# Patient Record
Sex: MALE | Race: BLACK OR AFRICAN AMERICAN | NOT HISPANIC OR LATINO
[De-identification: names, ages, dates, MRNs, and addresses within clinical notes are randomized per-mention and may not be internally consistent; named-entity substitution may affect disease eponyms.]

---

## 2019-09-23 ENCOUNTER — APPOINTMENT (OUTPATIENT)
Dept: UROLOGY | Facility: CLINIC | Age: 74
End: 2019-09-23
Payer: MEDICARE

## 2019-09-23 VITALS
WEIGHT: 175 LBS | SYSTOLIC BLOOD PRESSURE: 135 MMHG | HEIGHT: 69 IN | HEART RATE: 69 BPM | BODY MASS INDEX: 25.92 KG/M2 | TEMPERATURE: 98.2 F | DIASTOLIC BLOOD PRESSURE: 74 MMHG

## 2019-09-23 DIAGNOSIS — E11.9 TYPE 2 DIABETES MELLITUS W/OUT COMPLICATIONS: ICD-10-CM

## 2019-09-23 DIAGNOSIS — Z86.79 PERSONAL HISTORY OF OTHER DISEASES OF THE CIRCULATORY SYSTEM: ICD-10-CM

## 2019-09-23 DIAGNOSIS — Z86.69 PERSONAL HISTORY OF OTHER DISEASES OF THE NERVOUS SYSTEM AND SENSE ORGANS: ICD-10-CM

## 2019-09-23 DIAGNOSIS — N40.1 BENIGN PROSTATIC HYPERPLASIA WITH LOWER URINARY TRACT SYMPMS: ICD-10-CM

## 2019-09-23 DIAGNOSIS — N28.1 CYST OF KIDNEY, ACQUIRED: ICD-10-CM

## 2019-09-23 PROBLEM — Z00.00 ENCOUNTER FOR PREVENTIVE HEALTH EXAMINATION: Status: ACTIVE | Noted: 2019-09-23

## 2019-09-23 PROCEDURE — 51741 ELECTRO-UROFLOWMETRY FIRST: CPT

## 2019-09-23 PROCEDURE — 51798 US URINE CAPACITY MEASURE: CPT | Mod: 59

## 2019-09-23 PROCEDURE — 99214 OFFICE O/P EST MOD 30 MIN: CPT | Mod: 25

## 2019-09-23 PROCEDURE — 99205 OFFICE O/P NEW HI 60 MIN: CPT | Mod: 25

## 2019-09-23 RX ORDER — ROSUVASTATIN CALCIUM 20 MG/1
20 TABLET, FILM COATED ORAL
Refills: 0 | Status: ACTIVE | COMMUNITY

## 2019-09-23 RX ORDER — DORZOLAMIDE HYDROCHLORIDE 20 MG/ML
2 SOLUTION OPHTHALMIC
Refills: 0 | Status: ACTIVE | COMMUNITY

## 2019-09-23 RX ORDER — BRIMONIDINE TARTRATE, TIMOLOL MALEATE 2; 5 MG/ML; MG/ML
0.2-0.5 SOLUTION/ DROPS OPHTHALMIC
Refills: 0 | Status: ACTIVE | COMMUNITY

## 2019-09-23 RX ORDER — LINAGLIPTIN 5 MG/1
5 TABLET, FILM COATED ORAL
Refills: 0 | Status: ACTIVE | COMMUNITY

## 2019-09-23 RX ORDER — GLIMEPIRIDE 1 MG/1
1 TABLET ORAL
Refills: 0 | Status: ACTIVE | COMMUNITY

## 2019-09-23 RX ORDER — CHOLECALCIFEROL (VITAMIN D3) 25 MCG
TABLET ORAL
Refills: 0 | Status: ACTIVE | COMMUNITY

## 2019-09-23 RX ORDER — BIMATOPROST 0.1 MG/ML
0.01 SOLUTION/ DROPS OPHTHALMIC
Refills: 0 | Status: ACTIVE | COMMUNITY

## 2019-09-23 RX ORDER — PIOGLITAZONE HYDROCHLORIDE AND METFORMIN HYDROCHLORIDE 15; 850 MG/1; MG/1
15-850 TABLET, FILM COATED ORAL
Refills: 0 | Status: ACTIVE | COMMUNITY

## 2019-09-23 RX ORDER — IRBESARTAN AND HYDROCHLOROTHIAZIDE 300; 12.5 MG/1; MG/1
300-12.5 TABLET ORAL
Refills: 0 | Status: ACTIVE | COMMUNITY

## 2019-09-23 NOTE — LETTER BODY
[Dear  ___] : Dear  [unfilled], [Consult Letter:] : I had the pleasure of evaluating your patient, [unfilled]. [Please see my note below.] : Please see my note below. [Consult Closing:] : Thank you very much for allowing me to participate in the care of this patient.  If you have any questions, please do not hesitate to contact me. [Sincerely,] : Sincerely, [FreeTextEntry3] : Elie Herring MD\par

## 2019-09-23 NOTE — REVIEW OF SYSTEMS
[Negative] : Heme/Lymph [Feeling Poorly] : feeling poorly [Feeling Tired] : feeling tired [Eyesight Problems] : eyesight problems [Incontinence] : incontinence [Wake up at night to urinate  How many times?  ___] : wakes up to urinate [unfilled] times during the night [Strong urge to urinate] : strong urge to urinate [Slow urine stream] : slow urine stream [Interrupted urine stream] : interrupted urine stream [Joint Pain] : joint pain [Limb Swelling] : limb swelling [Muscle Weakness] : muscle weakness [Hesitancy] : urinary hesitancy [Nocturia] : nocturia [FreeTextEntry2] : htn

## 2019-09-23 NOTE — PHYSICAL EXAM
[General Appearance - Well Developed] : well developed [General Appearance - Well Nourished] : well nourished [Normal Appearance] : normal appearance [Well Groomed] : well groomed [General Appearance - In No Acute Distress] : no acute distress [Edema] : no peripheral edema [Respiration, Rhythm And Depth] : normal respiratory rhythm and effort [Exaggerated Use Of Accessory Muscles For Inspiration] : no accessory muscle use [Abdomen Soft] : soft [Abdomen Tenderness] : non-tender [Costovertebral Angle Tenderness] : no ~M costovertebral angle tenderness [Urethral Meatus] : meatus normal [Urinary Bladder Findings] : the bladder was normal on palpation [Scrotum] : the scrotum was normal [Testes Mass (___cm)] : there were no testicular masses [No Prostate Nodules] : no prostate nodules [Normal Station and Gait] : the gait and station were normal for the patient's age [] : no rash [No Focal Deficits] : no focal deficits [Oriented To Time, Place, And Person] : oriented to person, place, and time [Affect] : the affect was normal [Mood] : the mood was normal [Not Anxious] : not anxious [No Palpable Adenopathy] : no palpable adenopathy [FreeTextEntry1] : using cane

## 2019-09-27 LAB
APPEARANCE: CLEAR
BACTERIA UR CULT: NORMAL
BACTERIA: NEGATIVE
BILIRUBIN URINE: NEGATIVE
BLOOD URINE: NEGATIVE
COLOR: YELLOW
GLUCOSE QUALITATIVE U: NEGATIVE
HYALINE CASTS: 1 /LPF
KETONES URINE: NEGATIVE
LEUKOCYTE ESTERASE URINE: NEGATIVE
MICROSCOPIC-UA: NORMAL
NITRITE URINE: NEGATIVE
PH URINE: 5.5
PROTEIN URINE: NORMAL
RED BLOOD CELLS URINE: 2 /HPF
SPECIFIC GRAVITY URINE: 1.03
SQUAMOUS EPITHELIAL CELLS: 1 /HPF
UROBILINOGEN URINE: NORMAL
WHITE BLOOD CELLS URINE: 1 /HPF

## 2019-10-21 ENCOUNTER — APPOINTMENT (OUTPATIENT)
Dept: UROLOGY | Facility: CLINIC | Age: 74
End: 2019-10-21

## 2022-01-01 ENCOUNTER — OUTPATIENT (OUTPATIENT)
Dept: OUTPATIENT SERVICES | Facility: HOSPITAL | Age: 77
LOS: 1 days | Discharge: HOME | End: 2022-01-01
Payer: MEDICARE

## 2022-01-01 ENCOUNTER — OUTPATIENT (OUTPATIENT)
Dept: OUTPATIENT SERVICES | Facility: HOSPITAL | Age: 77
LOS: 1 days | End: 2022-01-01
Payer: MEDICARE

## 2022-01-01 ENCOUNTER — APPOINTMENT (OUTPATIENT)
Dept: VASCULAR SURGERY | Facility: CLINIC | Age: 77
End: 2022-01-01

## 2022-01-01 ENCOUNTER — APPOINTMENT (OUTPATIENT)
Dept: CT IMAGING | Facility: CLINIC | Age: 77
End: 2022-01-01

## 2022-01-01 ENCOUNTER — INPATIENT (INPATIENT)
Facility: HOSPITAL | Age: 77
LOS: 3 days | Discharge: EXPIRED | End: 2022-07-20
Attending: INTERNAL MEDICINE | Admitting: INTERNAL MEDICINE

## 2022-01-01 ENCOUNTER — APPOINTMENT (OUTPATIENT)
Dept: ULTRASOUND IMAGING | Facility: HOSPITAL | Age: 77
End: 2022-01-01

## 2022-01-01 ENCOUNTER — EMERGENCY (EMERGENCY)
Facility: HOSPITAL | Age: 77
LOS: 0 days | Discharge: HOME | End: 2022-07-05
Attending: EMERGENCY MEDICINE

## 2022-01-01 ENCOUNTER — RESULT REVIEW (OUTPATIENT)
Age: 77
End: 2022-01-01

## 2022-01-01 VITALS
HEIGHT: 69 IN | SYSTOLIC BLOOD PRESSURE: 130 MMHG | BODY MASS INDEX: 26.66 KG/M2 | HEART RATE: 97 BPM | WEIGHT: 180 LBS | DIASTOLIC BLOOD PRESSURE: 66 MMHG

## 2022-01-01 VITALS
SYSTOLIC BLOOD PRESSURE: 114 MMHG | BODY MASS INDEX: 26.66 KG/M2 | HEART RATE: 100 BPM | HEIGHT: 69 IN | WEIGHT: 180 LBS | DIASTOLIC BLOOD PRESSURE: 62 MMHG | OXYGEN SATURATION: 96 % | TEMPERATURE: 98.3 F

## 2022-01-01 VITALS
TEMPERATURE: 99 F | RESPIRATION RATE: 18 BRPM | HEART RATE: 67 BPM | SYSTOLIC BLOOD PRESSURE: 169 MMHG | OXYGEN SATURATION: 99 % | DIASTOLIC BLOOD PRESSURE: 70 MMHG

## 2022-01-01 VITALS
HEIGHT: 69 IN | TEMPERATURE: 98 F | OXYGEN SATURATION: 100 % | WEIGHT: 179.9 LBS | SYSTOLIC BLOOD PRESSURE: 155 MMHG | RESPIRATION RATE: 16 BRPM | HEART RATE: 72 BPM | DIASTOLIC BLOOD PRESSURE: 72 MMHG

## 2022-01-01 VITALS
HEIGHT: 69 IN | BODY MASS INDEX: 26.66 KG/M2 | HEART RATE: 76 BPM | DIASTOLIC BLOOD PRESSURE: 66 MMHG | SYSTOLIC BLOOD PRESSURE: 120 MMHG | WEIGHT: 180 LBS

## 2022-01-01 VITALS
HEIGHT: 69 IN | TEMPERATURE: 97 F | SYSTOLIC BLOOD PRESSURE: 113 MMHG | OXYGEN SATURATION: 99 % | RESPIRATION RATE: 18 BRPM | DIASTOLIC BLOOD PRESSURE: 61 MMHG | HEART RATE: 121 BPM

## 2022-01-01 VITALS — OXYGEN SATURATION: 100 %

## 2022-01-01 DIAGNOSIS — N17.9 ACUTE KIDNEY FAILURE, UNSPECIFIED: ICD-10-CM

## 2022-01-01 DIAGNOSIS — Z79.84 LONG TERM (CURRENT) USE OF ORAL HYPOGLYCEMIC DRUGS: ICD-10-CM

## 2022-01-01 DIAGNOSIS — Z98.89 OTHER SPECIFIED POSTPROCEDURAL STATES: Chronic | ICD-10-CM

## 2022-01-01 DIAGNOSIS — R60.9 EDEMA, UNSPECIFIED: ICD-10-CM

## 2022-01-01 DIAGNOSIS — S72.90XA UNSPECIFIED FRACTURE OF UNSPECIFIED FEMUR, INITIAL ENCOUNTER FOR CLOSED FRACTURE: Chronic | ICD-10-CM

## 2022-01-01 DIAGNOSIS — M79.605 PAIN IN LEFT LEG: ICD-10-CM

## 2022-01-01 DIAGNOSIS — I87.2 VENOUS INSUFFICIENCY (CHRONIC) (PERIPHERAL): ICD-10-CM

## 2022-01-01 DIAGNOSIS — M79.89 OTHER SPECIFIED SOFT TISSUE DISORDERS: ICD-10-CM

## 2022-01-01 DIAGNOSIS — M79.604 PAIN IN RIGHT LEG: ICD-10-CM

## 2022-01-01 DIAGNOSIS — I10 ESSENTIAL (PRIMARY) HYPERTENSION: ICD-10-CM

## 2022-01-01 DIAGNOSIS — G89.29 OTHER CHRONIC PAIN: ICD-10-CM

## 2022-01-01 DIAGNOSIS — I70.229 ATHEROSCLEROSIS OF NATIVE ARTERIES OF EXTREMITIES WITH REST PAIN, UNSPECIFIED EXTREMITY: ICD-10-CM

## 2022-01-01 DIAGNOSIS — K92.2 GASTROINTESTINAL HEMORRHAGE, UNSPECIFIED: ICD-10-CM

## 2022-01-01 DIAGNOSIS — K92.0 HEMATEMESIS: ICD-10-CM

## 2022-01-01 DIAGNOSIS — I70.202 UNSPECIFIED ATHEROSCLEROSIS OF NATIVE ARTERIES OF EXTREMITIES, LEFT LEG: ICD-10-CM

## 2022-01-01 DIAGNOSIS — I73.9 PERIPHERAL VASCULAR DISEASE, UNSPECIFIED: ICD-10-CM

## 2022-01-01 DIAGNOSIS — E11.51 TYPE 2 DIABETES MELLITUS WITH DIABETIC PERIPHERAL ANGIOPATHY WITHOUT GANGRENE: ICD-10-CM

## 2022-01-01 DIAGNOSIS — E11.9 TYPE 2 DIABETES MELLITUS WITHOUT COMPLICATIONS: ICD-10-CM

## 2022-01-01 DIAGNOSIS — Z95.828 PRESENCE OF OTHER VASCULAR IMPLANTS AND GRAFTS: ICD-10-CM

## 2022-01-01 DIAGNOSIS — E78.00 PURE HYPERCHOLESTEROLEMIA, UNSPECIFIED: ICD-10-CM

## 2022-01-01 DIAGNOSIS — Z00.8 ENCOUNTER FOR OTHER GENERAL EXAMINATION: ICD-10-CM

## 2022-01-01 DIAGNOSIS — Z78.9 OTHER SPECIFIED HEALTH STATUS: ICD-10-CM

## 2022-01-01 DIAGNOSIS — Z98.890 OTHER SPECIFIED POSTPROCEDURAL STATES: Chronic | ICD-10-CM

## 2022-01-01 DIAGNOSIS — E78.5 HYPERLIPIDEMIA, UNSPECIFIED: ICD-10-CM

## 2022-01-01 LAB
A1C WITH ESTIMATED AVERAGE GLUCOSE RESULT: 5.7 % — HIGH (ref 4–5.6)
ALBUMIN SERPL ELPH-MCNC: 1.9 G/DL — LOW (ref 3.3–5)
ALBUMIN SERPL ELPH-MCNC: 1.9 G/DL — LOW (ref 3.3–5)
ALBUMIN SERPL ELPH-MCNC: 2 G/DL — LOW (ref 3.3–5)
ALBUMIN SERPL ELPH-MCNC: 2.2 G/DL — LOW (ref 3.3–5)
ALBUMIN SERPL ELPH-MCNC: 2.9 G/DL — LOW (ref 3.3–5)
ALBUMIN SERPL ELPH-MCNC: 3.5 G/DL — SIGNIFICANT CHANGE UP (ref 3.3–5)
ALBUMIN SERPL ELPH-MCNC: 4.1 G/DL
ALP BLD-CCNC: 60 U/L
ALP SERPL-CCNC: 30 U/L — LOW (ref 40–120)
ALP SERPL-CCNC: 44 U/L — SIGNIFICANT CHANGE UP (ref 40–120)
ALP SERPL-CCNC: 44 U/L — SIGNIFICANT CHANGE UP (ref 40–120)
ALP SERPL-CCNC: 65 U/L — SIGNIFICANT CHANGE UP (ref 40–120)
ALP SERPL-CCNC: 73 U/L — SIGNIFICANT CHANGE UP (ref 40–120)
ALP SERPL-CCNC: 76 U/L — SIGNIFICANT CHANGE UP (ref 40–120)
ALT FLD-CCNC: 1687 U/L — HIGH (ref 4–41)
ALT FLD-CCNC: 2160 U/L — HIGH (ref 4–41)
ALT FLD-CCNC: 2171 U/L — HIGH (ref 4–41)
ALT FLD-CCNC: 3416 U/L — HIGH (ref 4–41)
ALT FLD-CCNC: 44 U/L — HIGH (ref 4–41)
ALT FLD-CCNC: 7 U/L — SIGNIFICANT CHANGE UP (ref 4–41)
ALT SERPL-CCNC: 11 U/L
ANION GAP SERPL CALC-SCNC: 10 MMOL/L
ANION GAP SERPL CALC-SCNC: 16 MMOL/L — HIGH (ref 7–14)
ANION GAP SERPL CALC-SCNC: 17 MMOL/L — HIGH (ref 7–14)
ANION GAP SERPL CALC-SCNC: 20 MMOL/L — HIGH (ref 7–14)
ANION GAP SERPL CALC-SCNC: 22 MMOL/L — HIGH (ref 7–14)
ANION GAP SERPL CALC-SCNC: 27 MMOL/L — HIGH (ref 7–14)
ANION GAP SERPL CALC-SCNC: 31 MMOL/L — HIGH (ref 7–14)
ANION GAP SERPL CALC-SCNC: 33 MMOL/L — HIGH (ref 7–14)
ANION GAP SERPL CALC-SCNC: 9 MMOL/L — SIGNIFICANT CHANGE UP (ref 7–14)
ANION GAP SERPL CALC-SCNC: >36 MMOL/L — HIGH (ref 7–14)
ANION GAP SERPL CALC-SCNC: >37 MMOL/L — HIGH (ref 7–14)
APTT BLD: 24.9 SEC — LOW (ref 27–36.3)
APTT BLD: 28.7 SEC — SIGNIFICANT CHANGE UP (ref 27–36.3)
APTT BLD: 39.8 SEC — HIGH (ref 27–36.3)
APTT BLD: 41.2 SEC — HIGH (ref 27–36.3)
APTT BLD: 43.6 SEC — HIGH (ref 27–36.3)
AST SERPL-CCNC: 14 U/L — SIGNIFICANT CHANGE UP (ref 4–40)
AST SERPL-CCNC: 20 U/L
AST SERPL-CCNC: 2489 U/L — HIGH (ref 4–40)
AST SERPL-CCNC: 2536 U/L — HIGH (ref 4–40)
AST SERPL-CCNC: 3036 U/L — HIGH (ref 4–40)
AST SERPL-CCNC: 3222 U/L — HIGH (ref 4–40)
AST SERPL-CCNC: 37 U/L — SIGNIFICANT CHANGE UP (ref 4–40)
BASOPHILS # BLD AUTO: 0 K/UL — SIGNIFICANT CHANGE UP (ref 0–0.2)
BASOPHILS # BLD AUTO: 0 K/UL — SIGNIFICANT CHANGE UP (ref 0–0.2)
BASOPHILS # BLD AUTO: 0.01 K/UL — SIGNIFICANT CHANGE UP (ref 0–0.2)
BASOPHILS # BLD AUTO: 0.01 K/UL — SIGNIFICANT CHANGE UP (ref 0–0.2)
BASOPHILS # BLD AUTO: 0.02 K/UL — SIGNIFICANT CHANGE UP (ref 0–0.2)
BASOPHILS # BLD AUTO: 0.04 K/UL — SIGNIFICANT CHANGE UP (ref 0–0.2)
BASOPHILS # BLD AUTO: 0.04 K/UL — SIGNIFICANT CHANGE UP (ref 0–0.2)
BASOPHILS # BLD AUTO: 0.07 K/UL — SIGNIFICANT CHANGE UP (ref 0–0.2)
BASOPHILS NFR BLD AUTO: 0 % — SIGNIFICANT CHANGE UP (ref 0–2)
BASOPHILS NFR BLD AUTO: 0 % — SIGNIFICANT CHANGE UP (ref 0–2)
BASOPHILS NFR BLD AUTO: 0.2 % — SIGNIFICANT CHANGE UP (ref 0–2)
BASOPHILS NFR BLD AUTO: 0.2 % — SIGNIFICANT CHANGE UP (ref 0–2)
BASOPHILS NFR BLD AUTO: 0.6 % — SIGNIFICANT CHANGE UP (ref 0–2)
BASOPHILS NFR BLD AUTO: 0.6 % — SIGNIFICANT CHANGE UP (ref 0–2)
BASOPHILS NFR BLD AUTO: 0.8 % — SIGNIFICANT CHANGE UP (ref 0–2)
BASOPHILS NFR BLD AUTO: 1 % — SIGNIFICANT CHANGE UP (ref 0–2)
BILIRUB SERPL-MCNC: 0.2 MG/DL
BILIRUB SERPL-MCNC: 0.3 MG/DL — SIGNIFICANT CHANGE UP (ref 0.2–1.2)
BILIRUB SERPL-MCNC: 0.7 MG/DL — SIGNIFICANT CHANGE UP (ref 0.2–1.2)
BILIRUB SERPL-MCNC: 0.7 MG/DL — SIGNIFICANT CHANGE UP (ref 0.2–1.2)
BILIRUB SERPL-MCNC: 1 MG/DL — SIGNIFICANT CHANGE UP (ref 0.2–1.2)
BILIRUB SERPL-MCNC: 1.1 MG/DL — SIGNIFICANT CHANGE UP (ref 0.2–1.2)
BILIRUB SERPL-MCNC: 1.2 MG/DL — SIGNIFICANT CHANGE UP (ref 0.2–1.2)
BLD GP AB SCN SERPL QL: NEGATIVE — SIGNIFICANT CHANGE UP
BLOOD GAS ARTERIAL - LYTES,HGB,ICA,LACT RESULT: SIGNIFICANT CHANGE UP
BLOOD GAS ARTERIAL COMPREHENSIVE RESULT: SIGNIFICANT CHANGE UP
BLOOD GAS VENOUS COMPREHENSIVE RESULT: SIGNIFICANT CHANGE UP
BUN SERPL-MCNC: 105 MG/DL — HIGH (ref 7–23)
BUN SERPL-MCNC: 107 MG/DL — HIGH (ref 7–23)
BUN SERPL-MCNC: 109 MG/DL — HIGH (ref 7–23)
BUN SERPL-MCNC: 111 MG/DL — HIGH (ref 7–23)
BUN SERPL-MCNC: 111 MG/DL — HIGH (ref 7–23)
BUN SERPL-MCNC: 112 MG/DL — HIGH (ref 7–23)
BUN SERPL-MCNC: 114 MG/DL — HIGH (ref 7–23)
BUN SERPL-MCNC: 120 MG/DL — HIGH (ref 7–23)
BUN SERPL-MCNC: 122 MG/DL — HIGH (ref 7–23)
BUN SERPL-MCNC: 124 MG/DL — HIGH (ref 7–23)
BUN SERPL-MCNC: 40 MG/DL
CALCIUM SERPL-MCNC: 6.6 MG/DL — LOW (ref 8.4–10.5)
CALCIUM SERPL-MCNC: 6.7 MG/DL — LOW (ref 8.4–10.5)
CALCIUM SERPL-MCNC: 7.1 MG/DL — LOW (ref 8.4–10.5)
CALCIUM SERPL-MCNC: 8.2 MG/DL — LOW (ref 8.4–10.5)
CALCIUM SERPL-MCNC: 8.3 MG/DL — LOW (ref 8.4–10.5)
CALCIUM SERPL-MCNC: 8.4 MG/DL — SIGNIFICANT CHANGE UP (ref 8.4–10.5)
CALCIUM SERPL-MCNC: 8.5 MG/DL — SIGNIFICANT CHANGE UP (ref 8.4–10.5)
CALCIUM SERPL-MCNC: 8.7 MG/DL — SIGNIFICANT CHANGE UP (ref 8.4–10.5)
CALCIUM SERPL-MCNC: 8.8 MG/DL — SIGNIFICANT CHANGE UP (ref 8.4–10.5)
CALCIUM SERPL-MCNC: 9.8 MG/DL
CALCIUM SERPL-MCNC: 9.8 MG/DL — SIGNIFICANT CHANGE UP (ref 8.4–10.5)
CHLORIDE SERPL-SCNC: 104 MMOL/L — SIGNIFICANT CHANGE UP (ref 98–107)
CHLORIDE SERPL-SCNC: 107 MMOL/L
CHLORIDE SERPL-SCNC: 108 MMOL/L — HIGH (ref 98–107)
CHLORIDE SERPL-SCNC: 109 MMOL/L — HIGH (ref 98–107)
CHLORIDE SERPL-SCNC: 109 MMOL/L — HIGH (ref 98–107)
CHLORIDE SERPL-SCNC: 111 MMOL/L — HIGH (ref 98–107)
CHLORIDE SERPL-SCNC: 112 MMOL/L — HIGH (ref 98–107)
CHLORIDE SERPL-SCNC: 112 MMOL/L — HIGH (ref 98–107)
CHLORIDE SERPL-SCNC: 113 MMOL/L — HIGH (ref 98–107)
CO2 SERPL-SCNC: 10 MMOL/L — CRITICAL LOW (ref 22–31)
CO2 SERPL-SCNC: 11 MMOL/L — LOW (ref 22–31)
CO2 SERPL-SCNC: 11 MMOL/L — LOW (ref 22–31)
CO2 SERPL-SCNC: 12 MMOL/L — LOW (ref 22–31)
CO2 SERPL-SCNC: 17 MMOL/L — LOW (ref 22–31)
CO2 SERPL-SCNC: 18 MMOL/L — LOW (ref 22–31)
CO2 SERPL-SCNC: 26 MMOL/L
CO2 SERPL-SCNC: <7 MMOL/L — CRITICAL LOW (ref 22–31)
CO2 SERPL-SCNC: <7 MMOL/L — CRITICAL LOW (ref 22–31)
CREAT ?TM UR-MCNC: 154 MG/DL — SIGNIFICANT CHANGE UP
CREAT SERPL-MCNC: 1.79 MG/DL — HIGH (ref 0.5–1.3)
CREAT SERPL-MCNC: 1.88 MG/DL — HIGH (ref 0.5–1.3)
CREAT SERPL-MCNC: 2.21 MG/DL
CREAT SERPL-MCNC: 2.43 MG/DL — HIGH (ref 0.5–1.3)
CREAT SERPL-MCNC: 2.53 MG/DL — HIGH (ref 0.5–1.3)
CREAT SERPL-MCNC: 2.58 MG/DL — HIGH (ref 0.5–1.3)
CREAT SERPL-MCNC: 3.33 MG/DL — HIGH (ref 0.5–1.3)
CREAT SERPL-MCNC: 3.37 MG/DL — HIGH (ref 0.5–1.3)
CREAT SERPL-MCNC: 3.56 MG/DL — HIGH (ref 0.5–1.3)
CREAT SERPL-MCNC: 3.68 MG/DL — HIGH (ref 0.5–1.3)
CREAT SERPL-MCNC: 3.76 MG/DL — HIGH (ref 0.5–1.3)
EGFR: 16 ML/MIN/1.73M2 — LOW
EGFR: 16 ML/MIN/1.73M2 — LOW
EGFR: 17 ML/MIN/1.73M2 — LOW
EGFR: 18 ML/MIN/1.73M2 — LOW
EGFR: 18 ML/MIN/1.73M2 — LOW
EGFR: 25 ML/MIN/1.73M2 — LOW
EGFR: 25 ML/MIN/1.73M2 — LOW
EGFR: 27 ML/MIN/1.73M2 — LOW
EGFR: 30 ML/MIN/1.73M2
EGFR: 36 ML/MIN/1.73M2 — LOW
EGFR: 39 ML/MIN/1.73M2 — LOW
EOSINOPHIL # BLD AUTO: 0 K/UL — SIGNIFICANT CHANGE UP (ref 0–0.5)
EOSINOPHIL # BLD AUTO: 0 K/UL — SIGNIFICANT CHANGE UP (ref 0–0.5)
EOSINOPHIL # BLD AUTO: 0.01 K/UL — SIGNIFICANT CHANGE UP (ref 0–0.5)
EOSINOPHIL # BLD AUTO: 0.03 K/UL — SIGNIFICANT CHANGE UP (ref 0–0.5)
EOSINOPHIL NFR BLD AUTO: 0 % — SIGNIFICANT CHANGE UP (ref 0–6)
EOSINOPHIL NFR BLD AUTO: 0 % — SIGNIFICANT CHANGE UP (ref 0–6)
EOSINOPHIL NFR BLD AUTO: 0.2 % — SIGNIFICANT CHANGE UP (ref 0–6)
EOSINOPHIL NFR BLD AUTO: 0.3 % — SIGNIFICANT CHANGE UP (ref 0–6)
EOSINOPHIL NFR BLD AUTO: 0.3 % — SIGNIFICANT CHANGE UP (ref 0–6)
ESTIMATED AVERAGE GLUCOSE: 117 — SIGNIFICANT CHANGE UP
FLUAV AG NPH QL: SIGNIFICANT CHANGE UP
FLUBV AG NPH QL: SIGNIFICANT CHANGE UP
GAS PNL BLDA: SIGNIFICANT CHANGE UP
GAS PNL BLDA: SIGNIFICANT CHANGE UP
GLUCOSE BLDC GLUCOMTR-MCNC: 107 MG/DL — HIGH (ref 70–99)
GLUCOSE BLDC GLUCOMTR-MCNC: 125 MG/DL — HIGH (ref 70–99)
GLUCOSE BLDC GLUCOMTR-MCNC: 151 MG/DL — HIGH (ref 70–99)
GLUCOSE BLDC GLUCOMTR-MCNC: 156 MG/DL — HIGH (ref 70–99)
GLUCOSE BLDC GLUCOMTR-MCNC: 165 MG/DL — HIGH (ref 70–99)
GLUCOSE BLDC GLUCOMTR-MCNC: 175 MG/DL — HIGH (ref 70–99)
GLUCOSE BLDC GLUCOMTR-MCNC: 194 MG/DL — HIGH (ref 70–99)
GLUCOSE BLDC GLUCOMTR-MCNC: 214 MG/DL — HIGH (ref 70–99)
GLUCOSE BLDC GLUCOMTR-MCNC: 241 MG/DL — HIGH (ref 70–99)
GLUCOSE BLDC GLUCOMTR-MCNC: 255 MG/DL — HIGH (ref 70–99)
GLUCOSE BLDC GLUCOMTR-MCNC: 273 MG/DL — HIGH (ref 70–99)
GLUCOSE BLDC GLUCOMTR-MCNC: 274 MG/DL — HIGH (ref 70–99)
GLUCOSE BLDC GLUCOMTR-MCNC: 276 MG/DL — HIGH (ref 70–99)
GLUCOSE BLDC GLUCOMTR-MCNC: 285 MG/DL — HIGH (ref 70–99)
GLUCOSE SERPL-MCNC: 128 MG/DL — HIGH (ref 70–99)
GLUCOSE SERPL-MCNC: 133 MG/DL — HIGH (ref 70–99)
GLUCOSE SERPL-MCNC: 158 MG/DL — HIGH (ref 70–99)
GLUCOSE SERPL-MCNC: 163 MG/DL — HIGH (ref 70–99)
GLUCOSE SERPL-MCNC: 167 MG/DL — HIGH (ref 70–99)
GLUCOSE SERPL-MCNC: 176 MG/DL — HIGH (ref 70–99)
GLUCOSE SERPL-MCNC: 227 MG/DL — HIGH (ref 70–99)
GLUCOSE SERPL-MCNC: 236 MG/DL — HIGH (ref 70–99)
GLUCOSE SERPL-MCNC: 281 MG/DL — HIGH (ref 70–99)
GLUCOSE SERPL-MCNC: 283 MG/DL — HIGH (ref 70–99)
GLUCOSE SERPL-MCNC: 53 MG/DL
HCT VFR BLD CALC: 15.7 % — CRITICAL LOW (ref 39–50)
HCT VFR BLD CALC: 17.6 % — CRITICAL LOW (ref 39–50)
HCT VFR BLD CALC: 20.3 % — CRITICAL LOW (ref 39–50)
HCT VFR BLD CALC: 20.9 % — CRITICAL LOW (ref 39–50)
HCT VFR BLD CALC: 21.6 % — LOW (ref 39–50)
HCT VFR BLD CALC: 21.7 % — LOW (ref 39–50)
HCT VFR BLD CALC: 22.2 % — LOW (ref 39–50)
HCT VFR BLD CALC: 22.9 % — LOW (ref 39–50)
HCT VFR BLD CALC: 23.5 % — LOW (ref 39–50)
HCT VFR BLD CALC: 26.1 % — LOW (ref 39–50)
HCT VFR BLD CALC: 28.1 % — LOW (ref 39–50)
HCT VFR BLD CALC: 28.2 % — LOW (ref 39–50)
HCT VFR BLD CALC: 29.1 % — LOW (ref 39–50)
HCT VFR BLD CALC: 31.4 % — LOW (ref 39–50)
HGB BLD-MCNC: 10.2 G/DL — LOW (ref 13–17)
HGB BLD-MCNC: 5.3 G/DL — CRITICAL LOW (ref 13–17)
HGB BLD-MCNC: 5.6 G/DL — CRITICAL LOW (ref 13–17)
HGB BLD-MCNC: 6.6 G/DL — CRITICAL LOW (ref 13–17)
HGB BLD-MCNC: 7.1 G/DL — LOW (ref 13–17)
HGB BLD-MCNC: 7.2 G/DL — LOW (ref 13–17)
HGB BLD-MCNC: 7.3 G/DL — LOW (ref 13–17)
HGB BLD-MCNC: 7.4 G/DL — LOW (ref 13–17)
HGB BLD-MCNC: 7.8 G/DL — LOW (ref 13–17)
HGB BLD-MCNC: 7.9 G/DL — LOW (ref 13–17)
HGB BLD-MCNC: 8.6 G/DL — LOW (ref 13–17)
HGB BLD-MCNC: 8.7 G/DL — LOW (ref 13–17)
HGB BLD-MCNC: 9.7 G/DL — LOW (ref 13–17)
HGB BLD-MCNC: 9.9 G/DL — LOW (ref 13–17)
IANC: 0.89 K/UL — LOW (ref 1.8–7.4)
IANC: 1.95 K/UL — SIGNIFICANT CHANGE UP (ref 1.8–7.4)
IANC: 2.9 K/UL — SIGNIFICANT CHANGE UP (ref 1.8–7.4)
IANC: 3.13 K/UL — SIGNIFICANT CHANGE UP (ref 1.8–7.4)
IANC: 3.79 K/UL — SIGNIFICANT CHANGE UP (ref 1.8–7.4)
IANC: 4.27 K/UL — SIGNIFICANT CHANGE UP (ref 1.8–7.4)
IANC: 4.69 K/UL — SIGNIFICANT CHANGE UP (ref 1.8–7.4)
IANC: 7 K/UL — SIGNIFICANT CHANGE UP (ref 1.8–7.4)
IMM GRANULOCYTES NFR BLD AUTO: 0.2 % — SIGNIFICANT CHANGE UP (ref 0–1.5)
IMM GRANULOCYTES NFR BLD AUTO: 0.3 % — SIGNIFICANT CHANGE UP (ref 0–1.5)
IMM GRANULOCYTES NFR BLD AUTO: 0.3 % — SIGNIFICANT CHANGE UP (ref 0–1.5)
IMM GRANULOCYTES NFR BLD AUTO: 2 % — HIGH (ref 0–1.5)
IMM GRANULOCYTES NFR BLD AUTO: 2.2 % — HIGH (ref 0–1.5)
IMM GRANULOCYTES NFR BLD AUTO: 3.3 % — HIGH (ref 0–1.5)
IMM GRANULOCYTES NFR BLD AUTO: 5.5 % — HIGH (ref 0–1.5)
INR BLD: 1.13 RATIO — SIGNIFICANT CHANGE UP (ref 0.88–1.16)
INR BLD: 1.41 RATIO — HIGH (ref 0.88–1.16)
INR BLD: 1.68 RATIO — HIGH (ref 0.88–1.16)
INR BLD: 1.83 RATIO — HIGH (ref 0.88–1.16)
INR BLD: 2.05 RATIO — HIGH (ref 0.88–1.16)
INR BLD: 2.64 RATIO — HIGH (ref 0.88–1.16)
LACTATE SERPL-SCNC: 13.9 MMOL/L — CRITICAL HIGH (ref 0.5–2)
LACTATE SERPL-SCNC: 15 MMOL/L — CRITICAL HIGH (ref 0.5–2)
LYMPHOCYTES # BLD AUTO: 0.14 K/UL — LOW (ref 1–3.3)
LYMPHOCYTES # BLD AUTO: 0.29 K/UL — LOW (ref 1–3.3)
LYMPHOCYTES # BLD AUTO: 0.61 K/UL — LOW (ref 1–3.3)
LYMPHOCYTES # BLD AUTO: 0.99 K/UL — LOW (ref 1–3.3)
LYMPHOCYTES # BLD AUTO: 1.08 K/UL — SIGNIFICANT CHANGE UP (ref 1–3.3)
LYMPHOCYTES # BLD AUTO: 1.11 K/UL — SIGNIFICANT CHANGE UP (ref 1–3.3)
LYMPHOCYTES # BLD AUTO: 1.68 K/UL — SIGNIFICANT CHANGE UP (ref 1–3.3)
LYMPHOCYTES # BLD AUTO: 17.6 % — SIGNIFICANT CHANGE UP (ref 13–44)
LYMPHOCYTES # BLD AUTO: 18.2 % — SIGNIFICANT CHANGE UP (ref 13–44)
LYMPHOCYTES # BLD AUTO: 2.46 K/UL — SIGNIFICANT CHANGE UP (ref 1–3.3)
LYMPHOCYTES # BLD AUTO: 21.5 % — SIGNIFICANT CHANGE UP (ref 13–44)
LYMPHOCYTES # BLD AUTO: 22.4 % — SIGNIFICANT CHANGE UP (ref 13–44)
LYMPHOCYTES # BLD AUTO: 27.1 % — SIGNIFICANT CHANGE UP (ref 13–44)
LYMPHOCYTES # BLD AUTO: 47.9 % — HIGH (ref 13–44)
LYMPHOCYTES # BLD AUTO: 5.6 % — LOW (ref 13–44)
LYMPHOCYTES # BLD AUTO: 7.4 % — LOW (ref 13–44)
MAGNESIUM SERPL-MCNC: 1.9 MG/DL — SIGNIFICANT CHANGE UP (ref 1.6–2.6)
MAGNESIUM SERPL-MCNC: 2.1 MG/DL — SIGNIFICANT CHANGE UP (ref 1.6–2.6)
MAGNESIUM SERPL-MCNC: 2.3 MG/DL — SIGNIFICANT CHANGE UP (ref 1.6–2.6)
MAGNESIUM SERPL-MCNC: 2.8 MG/DL — HIGH (ref 1.6–2.6)
MAGNESIUM SERPL-MCNC: 3.1 MG/DL — HIGH (ref 1.6–2.6)
MCHC RBC-ENTMCNC: 30.3 PG — SIGNIFICANT CHANGE UP (ref 27–34)
MCHC RBC-ENTMCNC: 30.5 GM/DL — LOW (ref 32–36)
MCHC RBC-ENTMCNC: 30.5 PG — SIGNIFICANT CHANGE UP (ref 27–34)
MCHC RBC-ENTMCNC: 30.8 PG — SIGNIFICANT CHANGE UP (ref 27–34)
MCHC RBC-ENTMCNC: 30.8 PG — SIGNIFICANT CHANGE UP (ref 27–34)
MCHC RBC-ENTMCNC: 30.9 PG — SIGNIFICANT CHANGE UP (ref 27–34)
MCHC RBC-ENTMCNC: 31.1 PG — SIGNIFICANT CHANGE UP (ref 27–34)
MCHC RBC-ENTMCNC: 31.3 PG — SIGNIFICANT CHANGE UP (ref 27–34)
MCHC RBC-ENTMCNC: 31.4 PG — SIGNIFICANT CHANGE UP (ref 27–34)
MCHC RBC-ENTMCNC: 31.6 PG — SIGNIFICANT CHANGE UP (ref 27–34)
MCHC RBC-ENTMCNC: 31.8 GM/DL — LOW (ref 32–36)
MCHC RBC-ENTMCNC: 31.9 PG — SIGNIFICANT CHANGE UP (ref 27–34)
MCHC RBC-ENTMCNC: 32.4 PG — SIGNIFICANT CHANGE UP (ref 27–34)
MCHC RBC-ENTMCNC: 32.5 GM/DL — SIGNIFICANT CHANGE UP (ref 32–36)
MCHC RBC-ENTMCNC: 32.5 GM/DL — SIGNIFICANT CHANGE UP (ref 32–36)
MCHC RBC-ENTMCNC: 32.9 GM/DL — SIGNIFICANT CHANGE UP (ref 32–36)
MCHC RBC-ENTMCNC: 33.3 GM/DL — SIGNIFICANT CHANGE UP (ref 32–36)
MCHC RBC-ENTMCNC: 33.6 GM/DL — SIGNIFICANT CHANGE UP (ref 32–36)
MCHC RBC-ENTMCNC: 33.8 GM/DL — SIGNIFICANT CHANGE UP (ref 32–36)
MCHC RBC-ENTMCNC: 34 GM/DL — SIGNIFICANT CHANGE UP (ref 32–36)
MCHC RBC-ENTMCNC: 34.1 GM/DL — SIGNIFICANT CHANGE UP (ref 32–36)
MCHC RBC-ENTMCNC: 34.1 GM/DL — SIGNIFICANT CHANGE UP (ref 32–36)
MCHC RBC-ENTMCNC: 35.2 GM/DL — SIGNIFICANT CHANGE UP (ref 32–36)
MCV RBC AUTO: 100 FL — SIGNIFICANT CHANGE UP (ref 80–100)
MCV RBC AUTO: 101.7 FL — HIGH (ref 80–100)
MCV RBC AUTO: 89.8 FL — SIGNIFICANT CHANGE UP (ref 80–100)
MCV RBC AUTO: 90.9 FL — SIGNIFICANT CHANGE UP (ref 80–100)
MCV RBC AUTO: 91.2 FL — SIGNIFICANT CHANGE UP (ref 80–100)
MCV RBC AUTO: 92.3 FL — SIGNIFICANT CHANGE UP (ref 80–100)
MCV RBC AUTO: 92.7 FL — SIGNIFICANT CHANGE UP (ref 80–100)
MCV RBC AUTO: 92.9 FL — SIGNIFICANT CHANGE UP (ref 80–100)
MCV RBC AUTO: 93.3 FL — SIGNIFICANT CHANGE UP (ref 80–100)
MCV RBC AUTO: 94.1 FL — SIGNIFICANT CHANGE UP (ref 80–100)
MCV RBC AUTO: 94.6 FL — SIGNIFICANT CHANGE UP (ref 80–100)
MCV RBC AUTO: 94.9 FL — SIGNIFICANT CHANGE UP (ref 80–100)
MCV RBC AUTO: 94.9 FL — SIGNIFICANT CHANGE UP (ref 80–100)
MCV RBC AUTO: 96.6 FL — SIGNIFICANT CHANGE UP (ref 80–100)
MONOCYTES # BLD AUTO: 0 K/UL — SIGNIFICANT CHANGE UP (ref 0–0.9)
MONOCYTES # BLD AUTO: 0.33 K/UL — SIGNIFICANT CHANGE UP (ref 0–0.9)
MONOCYTES # BLD AUTO: 0.33 K/UL — SIGNIFICANT CHANGE UP (ref 0–0.9)
MONOCYTES # BLD AUTO: 0.46 K/UL — SIGNIFICANT CHANGE UP (ref 0–0.9)
MONOCYTES # BLD AUTO: 0.53 K/UL — SIGNIFICANT CHANGE UP (ref 0–0.9)
MONOCYTES # BLD AUTO: 0.56 K/UL — SIGNIFICANT CHANGE UP (ref 0–0.9)
MONOCYTES # BLD AUTO: 0.7 K/UL — SIGNIFICANT CHANGE UP (ref 0–0.9)
MONOCYTES # BLD AUTO: 0.84 K/UL — SIGNIFICANT CHANGE UP (ref 0–0.9)
MONOCYTES NFR BLD AUTO: 0 % — LOW (ref 2–14)
MONOCYTES NFR BLD AUTO: 10 % — SIGNIFICANT CHANGE UP (ref 2–14)
MONOCYTES NFR BLD AUTO: 11.3 % — SIGNIFICANT CHANGE UP (ref 2–14)
MONOCYTES NFR BLD AUTO: 13.3 % — SIGNIFICANT CHANGE UP (ref 2–14)
MONOCYTES NFR BLD AUTO: 13.6 % — SIGNIFICANT CHANGE UP (ref 2–14)
MONOCYTES NFR BLD AUTO: 5.2 % — SIGNIFICANT CHANGE UP (ref 2–14)
MONOCYTES NFR BLD AUTO: 7.6 % — SIGNIFICANT CHANGE UP (ref 2–14)
MONOCYTES NFR BLD AUTO: 9.4 % — SIGNIFICANT CHANGE UP (ref 2–14)
NEUTROPHILS # BLD AUTO: 0.67 K/UL — LOW (ref 1.8–7.4)
NEUTROPHILS # BLD AUTO: 1.95 K/UL — SIGNIFICANT CHANGE UP (ref 1.8–7.4)
NEUTROPHILS # BLD AUTO: 2.9 K/UL — SIGNIFICANT CHANGE UP (ref 1.8–7.4)
NEUTROPHILS # BLD AUTO: 3.13 K/UL — SIGNIFICANT CHANGE UP (ref 1.8–7.4)
NEUTROPHILS # BLD AUTO: 3.79 K/UL — SIGNIFICANT CHANGE UP (ref 1.8–7.4)
NEUTROPHILS # BLD AUTO: 4.27 K/UL — SIGNIFICANT CHANGE UP (ref 1.8–7.4)
NEUTROPHILS # BLD AUTO: 4.69 K/UL — SIGNIFICANT CHANGE UP (ref 1.8–7.4)
NEUTROPHILS # BLD AUTO: 7 K/UL — SIGNIFICANT CHANGE UP (ref 1.8–7.4)
NEUTROPHILS NFR BLD AUTO: 52.1 % — SIGNIFICANT CHANGE UP (ref 43–77)
NEUTROPHILS NFR BLD AUTO: 60.9 % — SIGNIFICANT CHANGE UP (ref 43–77)
NEUTROPHILS NFR BLD AUTO: 63.6 % — SIGNIFICANT CHANGE UP (ref 43–77)
NEUTROPHILS NFR BLD AUTO: 68.1 % — SIGNIFICANT CHANGE UP (ref 43–77)
NEUTROPHILS NFR BLD AUTO: 71.7 % — SIGNIFICANT CHANGE UP (ref 43–77)
NEUTROPHILS NFR BLD AUTO: 74.2 % — SIGNIFICANT CHANGE UP (ref 43–77)
NEUTROPHILS NFR BLD AUTO: 74.4 % — SIGNIFICANT CHANGE UP (ref 43–77)
NEUTROPHILS NFR BLD AUTO: 78.3 % — HIGH (ref 43–77)
NRBC # BLD: 0 /100 WBCS — SIGNIFICANT CHANGE UP
NRBC # BLD: 105 /100 WBCS — SIGNIFICANT CHANGE UP
NRBC # BLD: 124 /100 WBCS — SIGNIFICANT CHANGE UP
NRBC # BLD: 2 /100 WBCS — SIGNIFICANT CHANGE UP
NRBC # BLD: 2 /100 WBCS — SIGNIFICANT CHANGE UP
NRBC # BLD: 20 /100 WBCS — SIGNIFICANT CHANGE UP
NRBC # BLD: 26 /100 WBCS — SIGNIFICANT CHANGE UP
NRBC # FLD: 0 K/UL — SIGNIFICANT CHANGE UP
NRBC # FLD: 0.02 K/UL — HIGH
NRBC # FLD: 0.03 K/UL — HIGH
NRBC # FLD: 0.04 K/UL — HIGH
NRBC # FLD: 0.24 K/UL — HIGH
NRBC # FLD: 0.3 K/UL — HIGH
NRBC # FLD: 1.66 K/UL — HIGH
NRBC # FLD: 2.22 K/UL — HIGH
NRBC # FLD: 3.09 K/UL — HIGH
NRBC # FLD: 4.08 K/UL — HIGH
PHOSPHATE SERPL-MCNC: 11.8 MG/DL — HIGH (ref 2.5–4.5)
PHOSPHATE SERPL-MCNC: 12.7 MG/DL — HIGH (ref 2.5–4.5)
PHOSPHATE SERPL-MCNC: 15.3 MG/DL — HIGH (ref 2.5–4.5)
PHOSPHATE SERPL-MCNC: 16.9 MG/DL — HIGH (ref 2.5–4.5)
PHOSPHATE SERPL-MCNC: 2.2 MG/DL — LOW (ref 2.5–4.5)
PHOSPHATE SERPL-MCNC: 5.2 MG/DL — HIGH (ref 2.5–4.5)
PHOSPHATE SERPL-MCNC: 8.6 MG/DL — HIGH (ref 2.5–4.5)
PLATELET # BLD AUTO: 101 K/UL — LOW (ref 150–400)
PLATELET # BLD AUTO: 110 K/UL — LOW (ref 150–400)
PLATELET # BLD AUTO: 112 K/UL — LOW (ref 150–400)
PLATELET # BLD AUTO: 132 K/UL — LOW (ref 150–400)
PLATELET # BLD AUTO: 136 K/UL — LOW (ref 150–400)
PLATELET # BLD AUTO: 138 K/UL — LOW (ref 150–400)
PLATELET # BLD AUTO: 154 K/UL — SIGNIFICANT CHANGE UP (ref 150–400)
PLATELET # BLD AUTO: 173 K/UL — SIGNIFICANT CHANGE UP (ref 150–400)
PLATELET # BLD AUTO: 192 K/UL — SIGNIFICANT CHANGE UP (ref 150–400)
PLATELET # BLD AUTO: 254 K/UL — SIGNIFICANT CHANGE UP (ref 150–400)
PLATELET # BLD AUTO: 54 K/UL — LOW (ref 150–400)
PLATELET # BLD AUTO: 79 K/UL — LOW (ref 150–400)
PLATELET # BLD AUTO: 85 K/UL — LOW (ref 150–400)
PLATELET # BLD AUTO: 85 K/UL — LOW (ref 150–400)
POTASSIUM SERPL-MCNC: 4.6 MMOL/L — SIGNIFICANT CHANGE UP (ref 3.5–5.3)
POTASSIUM SERPL-MCNC: 4.9 MMOL/L — SIGNIFICANT CHANGE UP (ref 3.5–5.3)
POTASSIUM SERPL-MCNC: 5 MMOL/L — SIGNIFICANT CHANGE UP (ref 3.5–5.3)
POTASSIUM SERPL-MCNC: 5 MMOL/L — SIGNIFICANT CHANGE UP (ref 3.5–5.3)
POTASSIUM SERPL-MCNC: 5.3 MMOL/L — SIGNIFICANT CHANGE UP (ref 3.5–5.3)
POTASSIUM SERPL-MCNC: 6.1 MMOL/L — HIGH (ref 3.5–5.3)
POTASSIUM SERPL-MCNC: 7 MMOL/L — CRITICAL HIGH (ref 3.5–5.3)
POTASSIUM SERPL-MCNC: 7.3 MMOL/L — CRITICAL HIGH (ref 3.5–5.3)
POTASSIUM SERPL-SCNC: 4.6 MMOL/L — SIGNIFICANT CHANGE UP (ref 3.5–5.3)
POTASSIUM SERPL-SCNC: 4.9 MMOL/L — SIGNIFICANT CHANGE UP (ref 3.5–5.3)
POTASSIUM SERPL-SCNC: 5 MMOL/L — SIGNIFICANT CHANGE UP (ref 3.5–5.3)
POTASSIUM SERPL-SCNC: 5 MMOL/L — SIGNIFICANT CHANGE UP (ref 3.5–5.3)
POTASSIUM SERPL-SCNC: 5.3 MMOL/L — SIGNIFICANT CHANGE UP (ref 3.5–5.3)
POTASSIUM SERPL-SCNC: 5.4 MMOL/L
POTASSIUM SERPL-SCNC: 6.1 MMOL/L — HIGH (ref 3.5–5.3)
POTASSIUM SERPL-SCNC: 7 MMOL/L — CRITICAL HIGH (ref 3.5–5.3)
POTASSIUM SERPL-SCNC: 7.3 MMOL/L — CRITICAL HIGH (ref 3.5–5.3)
PROT ?TM UR-MCNC: 6 MG/DL — SIGNIFICANT CHANGE UP
PROT ?TM UR-MCNC: 6 MG/DL — SIGNIFICANT CHANGE UP
PROT SERPL-MCNC: 3.3 G/DL — LOW (ref 6–8.3)
PROT SERPL-MCNC: 3.3 G/DL — LOW (ref 6–8.3)
PROT SERPL-MCNC: 3.4 G/DL — LOW (ref 6–8.3)
PROT SERPL-MCNC: 3.7 G/DL — LOW (ref 6–8.3)
PROT SERPL-MCNC: 4.4 G/DL — LOW (ref 6–8.3)
PROT SERPL-MCNC: 6.4 G/DL — SIGNIFICANT CHANGE UP (ref 6–8.3)
PROT SERPL-MCNC: 6.6 G/DL
PROT/CREAT UR-RTO: 0 RATIO — SIGNIFICANT CHANGE UP (ref 0–0.2)
PROTHROM AB SERPL-ACNC: 13.1 SEC — SIGNIFICANT CHANGE UP (ref 10.5–13.4)
PROTHROM AB SERPL-ACNC: 16.4 SEC — HIGH (ref 10.5–13.4)
PROTHROM AB SERPL-ACNC: 19.6 SEC — HIGH (ref 10.5–13.4)
PROTHROM AB SERPL-ACNC: 21.3 SEC — HIGH (ref 10.5–13.4)
PROTHROM AB SERPL-ACNC: 23.9 SEC — HIGH (ref 10.5–13.4)
PROTHROM AB SERPL-ACNC: 30.9 SEC — HIGH (ref 10.5–13.4)
RBC # BLD: 1.66 M/UL — LOW (ref 4.2–5.8)
RBC # BLD: 1.73 M/UL — LOW (ref 4.2–5.8)
RBC # BLD: 2.14 M/UL — LOW (ref 4.2–5.8)
RBC # BLD: 2.25 M/UL — LOW (ref 4.2–5.8)
RBC # BLD: 2.34 M/UL — LOW (ref 4.2–5.8)
RBC # BLD: 2.34 M/UL — LOW (ref 4.2–5.8)
RBC # BLD: 2.36 M/UL — LOW (ref 4.2–5.8)
RBC # BLD: 2.51 M/UL — LOW (ref 4.2–5.8)
RBC # BLD: 2.52 M/UL — LOW (ref 4.2–5.8)
RBC # BLD: 2.75 M/UL — LOW (ref 4.2–5.8)
RBC # BLD: 2.82 M/UL — LOW (ref 4.2–5.8)
RBC # BLD: 3.13 M/UL — LOW (ref 4.2–5.8)
RBC # BLD: 3.2 M/UL — LOW (ref 4.2–5.8)
RBC # BLD: 3.25 M/UL — LOW (ref 4.2–5.8)
RBC # FLD: 13.8 % — SIGNIFICANT CHANGE UP (ref 10.3–14.5)
RBC # FLD: 14 % — SIGNIFICANT CHANGE UP (ref 10.3–14.5)
RBC # FLD: 14.1 % — SIGNIFICANT CHANGE UP (ref 10.3–14.5)
RBC # FLD: 14.2 % — SIGNIFICANT CHANGE UP (ref 10.3–14.5)
RBC # FLD: 14.7 % — HIGH (ref 10.3–14.5)
RBC # FLD: 14.8 % — HIGH (ref 10.3–14.5)
RBC # FLD: 14.9 % — HIGH (ref 10.3–14.5)
RBC # FLD: 14.9 % — HIGH (ref 10.3–14.5)
RBC # FLD: 15 % — HIGH (ref 10.3–14.5)
RBC # FLD: 15.4 % — HIGH (ref 10.3–14.5)
RBC # FLD: 15.4 % — HIGH (ref 10.3–14.5)
RBC # FLD: 15.6 % — HIGH (ref 10.3–14.5)
RBC # FLD: 15.8 % — HIGH (ref 10.3–14.5)
RBC # FLD: 15.9 % — HIGH (ref 10.3–14.5)
RH IG SCN BLD-IMP: POSITIVE — SIGNIFICANT CHANGE UP
RSV RNA NPH QL NAA+NON-PROBE: SIGNIFICANT CHANGE UP
SARS-COV-2 RNA SPEC QL NAA+PROBE: SIGNIFICANT CHANGE UP
SARS-COV-2 RNA SPEC QL NAA+PROBE: SIGNIFICANT CHANGE UP
SODIUM SERPL-SCNC: 137 MMOL/L — SIGNIFICANT CHANGE UP (ref 135–145)
SODIUM SERPL-SCNC: 139 MMOL/L — SIGNIFICANT CHANGE UP (ref 135–145)
SODIUM SERPL-SCNC: 141 MMOL/L — SIGNIFICANT CHANGE UP (ref 135–145)
SODIUM SERPL-SCNC: 142 MMOL/L
SODIUM SERPL-SCNC: 144 MMOL/L — SIGNIFICANT CHANGE UP (ref 135–145)
SODIUM SERPL-SCNC: 145 MMOL/L — SIGNIFICANT CHANGE UP (ref 135–145)
SODIUM SERPL-SCNC: 147 MMOL/L — HIGH (ref 135–145)
SODIUM SERPL-SCNC: 149 MMOL/L — HIGH (ref 135–145)
SODIUM SERPL-SCNC: 153 MMOL/L — HIGH (ref 135–145)
SODIUM SERPL-SCNC: 154 MMOL/L — HIGH (ref 135–145)
SODIUM SERPL-SCNC: 156 MMOL/L — HIGH (ref 135–145)
SODIUM UR-SCNC: 34 MMOL/L — SIGNIFICANT CHANGE UP
UUN UR-MCNC: 922.8 MG/DL — SIGNIFICANT CHANGE UP
WBC # BLD: 1.28 K/UL — LOW (ref 3.8–10.5)
WBC # BLD: 10.1 K/UL — SIGNIFICANT CHANGE UP (ref 3.8–10.5)
WBC # BLD: 11 K/UL — HIGH (ref 3.8–10.5)
WBC # BLD: 11.14 K/UL — HIGH (ref 3.8–10.5)
WBC # BLD: 12.57 K/UL — HIGH (ref 3.8–10.5)
WBC # BLD: 13.76 K/UL — HIGH (ref 3.8–10.5)
WBC # BLD: 15.78 K/UL — HIGH (ref 3.8–10.5)
WBC # BLD: 2.49 K/UL — LOW (ref 3.8–10.5)
WBC # BLD: 3.9 K/UL — SIGNIFICANT CHANGE UP (ref 3.8–10.5)
WBC # BLD: 4.6 K/UL — SIGNIFICANT CHANGE UP (ref 3.8–10.5)
WBC # BLD: 5.95 K/UL — SIGNIFICANT CHANGE UP (ref 3.8–10.5)
WBC # BLD: 6.21 K/UL — SIGNIFICANT CHANGE UP (ref 3.8–10.5)
WBC # BLD: 6.32 K/UL — SIGNIFICANT CHANGE UP (ref 3.8–10.5)
WBC # BLD: 7.29 K/UL — SIGNIFICANT CHANGE UP (ref 3.8–10.5)
WBC # FLD AUTO: 1.28 K/UL — LOW (ref 3.8–10.5)
WBC # FLD AUTO: 10.1 K/UL — SIGNIFICANT CHANGE UP (ref 3.8–10.5)
WBC # FLD AUTO: 11 K/UL — HIGH (ref 3.8–10.5)
WBC # FLD AUTO: 11.14 K/UL — HIGH (ref 3.8–10.5)
WBC # FLD AUTO: 12.57 K/UL — HIGH (ref 3.8–10.5)
WBC # FLD AUTO: 13.76 K/UL — HIGH (ref 3.8–10.5)
WBC # FLD AUTO: 15.78 K/UL — HIGH (ref 3.8–10.5)
WBC # FLD AUTO: 2.49 K/UL — LOW (ref 3.8–10.5)
WBC # FLD AUTO: 3.9 K/UL — SIGNIFICANT CHANGE UP (ref 3.8–10.5)
WBC # FLD AUTO: 4.6 K/UL — SIGNIFICANT CHANGE UP (ref 3.8–10.5)
WBC # FLD AUTO: 5.95 K/UL — SIGNIFICANT CHANGE UP (ref 3.8–10.5)
WBC # FLD AUTO: 6.21 K/UL — SIGNIFICANT CHANGE UP (ref 3.8–10.5)
WBC # FLD AUTO: 6.32 K/UL — SIGNIFICANT CHANGE UP (ref 3.8–10.5)
WBC # FLD AUTO: 7.29 K/UL — SIGNIFICANT CHANGE UP (ref 3.8–10.5)

## 2022-01-01 PROCEDURE — 99292 CRITICAL CARE ADDL 30 MIN: CPT | Mod: FS,25

## 2022-01-01 PROCEDURE — 99223 1ST HOSP IP/OBS HIGH 75: CPT

## 2022-01-01 PROCEDURE — 99204 OFFICE O/P NEW MOD 45 MIN: CPT

## 2022-01-01 PROCEDURE — 36247 INS CATH ABD/L-EXT ART 3RD: CPT

## 2022-01-01 PROCEDURE — 93308 TTE F-UP OR LMTD: CPT | Mod: 26,GC

## 2022-01-01 PROCEDURE — 76937 US GUIDE VASCULAR ACCESS: CPT | Mod: 26

## 2022-01-01 PROCEDURE — 74018 RADEX ABDOMEN 1 VIEW: CPT | Mod: 26

## 2022-01-01 PROCEDURE — 93971 EXTREMITY STUDY: CPT | Mod: 26,RT

## 2022-01-01 PROCEDURE — 93924 LWR XTR VASC STDY BILAT: CPT

## 2022-01-01 PROCEDURE — 36620 INSERTION CATHETER ARTERY: CPT | Mod: GC

## 2022-01-01 PROCEDURE — 93880 EXTRACRANIAL BILAT STUDY: CPT

## 2022-01-01 PROCEDURE — 92950 HEART/LUNG RESUSCITATION CPR: CPT | Mod: GC

## 2022-01-01 PROCEDURE — 76604 US EXAM CHEST: CPT | Mod: 26,GC

## 2022-01-01 PROCEDURE — 99291 CRITICAL CARE FIRST HOUR: CPT

## 2022-01-01 PROCEDURE — 99215 OFFICE O/P EST HI 40 MIN: CPT

## 2022-01-01 PROCEDURE — 43255 EGD CONTROL BLEEDING ANY: CPT | Mod: GC

## 2022-01-01 PROCEDURE — 93925 LOWER EXTREMITY STUDY: CPT

## 2022-01-01 PROCEDURE — 99291 CRITICAL CARE FIRST HOUR: CPT | Mod: FS,25

## 2022-01-01 PROCEDURE — 36245 INS CATH ABD/L-EXT ART 1ST: CPT | Mod: 59

## 2022-01-01 PROCEDURE — 36569 INSJ PICC 5 YR+ W/O IMAGING: CPT | Mod: GC

## 2022-01-01 PROCEDURE — 71045 X-RAY EXAM CHEST 1 VIEW: CPT | Mod: 26

## 2022-01-01 PROCEDURE — 99284 EMERGENCY DEPT VISIT MOD MDM: CPT | Mod: GC

## 2022-01-01 PROCEDURE — 93926 LOWER EXTREMITY STUDY: CPT | Mod: 26,LT

## 2022-01-01 PROCEDURE — 99284 EMERGENCY DEPT VISIT MOD MDM: CPT

## 2022-01-01 PROCEDURE — 99214 OFFICE O/P EST MOD 30 MIN: CPT

## 2022-01-01 PROCEDURE — 99205 OFFICE O/P NEW HI 60 MIN: CPT

## 2022-01-01 PROCEDURE — 93306 TTE W/DOPPLER COMPLETE: CPT | Mod: 26

## 2022-01-01 PROCEDURE — 99285 EMERGENCY DEPT VISIT HI MDM: CPT | Mod: GC

## 2022-01-01 PROCEDURE — 37244 VASC EMBOLIZE/OCCLUDE BLEED: CPT

## 2022-01-01 PROCEDURE — 75635 CT ANGIO ABDOMINAL ARTERIES: CPT | Mod: MH

## 2022-01-01 PROCEDURE — 93970 EXTREMITY STUDY: CPT | Mod: 26

## 2022-01-01 PROCEDURE — 76775 US EXAM ABDO BACK WALL LIM: CPT | Mod: 26,GC

## 2022-01-01 DEVICE — CLIP RESOLUTION 360 235CM: Type: IMPLANTABLE DEVICE | Status: FUNCTIONAL

## 2022-01-01 DEVICE — HEMOSPRAY HEMOSTAT ENDO 7F: Type: IMPLANTABLE DEVICE | Status: FUNCTIONAL

## 2022-01-01 RX ORDER — SODIUM BICARBONATE 1 MEQ/ML
50 SYRINGE (ML) INTRAVENOUS ONCE
Refills: 0 | Status: COMPLETED | OUTPATIENT
Start: 2022-01-01 | End: 2022-01-01

## 2022-01-01 RX ORDER — VASOPRESSIN 20 [USP'U]/ML
0.04 INJECTION INTRAVENOUS
Qty: 50 | Refills: 0 | Status: DISCONTINUED | OUTPATIENT
Start: 2022-01-01 | End: 2022-01-01

## 2022-01-01 RX ORDER — CALCIUM GLUCONATE 100 MG/ML
1 VIAL (ML) INTRAVENOUS ONCE
Refills: 0 | Status: COMPLETED | OUTPATIENT
Start: 2022-01-01 | End: 2022-01-01

## 2022-01-01 RX ORDER — OXYCODONE AND ACETAMINOPHEN 5; 325 MG/1; MG/1
5-325 TABLET ORAL
Qty: 9 | Refills: 0 | Status: ACTIVE | COMMUNITY
Start: 2022-01-01

## 2022-01-01 RX ORDER — AMLODIPINE AND VALSARTAN 5; 320 MG/1; MG/1
5-320 TABLET, FILM COATED ORAL
Qty: 90 | Refills: 0 | Status: ACTIVE | COMMUNITY
Start: 2022-01-01

## 2022-01-01 RX ORDER — CLOPIDOGREL BISULFATE 75 MG/1
1 TABLET, FILM COATED ORAL
Qty: 0 | Refills: 0 | DISCHARGE

## 2022-01-01 RX ORDER — INSULIN HUMAN 100 [IU]/ML
5 INJECTION, SOLUTION SUBCUTANEOUS ONCE
Refills: 0 | Status: COMPLETED | OUTPATIENT
Start: 2022-01-01 | End: 2022-01-01

## 2022-01-01 RX ORDER — SIMETHICONE 80 MG/1
80 TABLET, CHEWABLE ORAL ONCE
Refills: 0 | Status: COMPLETED | OUTPATIENT
Start: 2022-01-01 | End: 2022-01-01

## 2022-01-01 RX ORDER — AMLODIPINE AND VALSARTAN 5; 320 MG/1; MG/1
1 TABLET, FILM COATED ORAL
Qty: 0 | Refills: 0 | DISCHARGE

## 2022-01-01 RX ORDER — GLUCAGON INJECTION, SOLUTION 0.5 MG/.1ML
1 INJECTION, SOLUTION SUBCUTANEOUS ONCE
Refills: 0 | Status: DISCONTINUED | OUTPATIENT
Start: 2022-01-01 | End: 2022-01-01

## 2022-01-01 RX ORDER — METOCLOPRAMIDE HCL 10 MG
10 TABLET ORAL ONCE
Refills: 0 | Status: COMPLETED | OUTPATIENT
Start: 2022-01-01 | End: 2022-01-01

## 2022-01-01 RX ORDER — DEXTROSE 50 % IN WATER 50 %
25 SYRINGE (ML) INTRAVENOUS ONCE
Refills: 0 | Status: DISCONTINUED | OUTPATIENT
Start: 2022-01-01 | End: 2022-01-01

## 2022-01-01 RX ORDER — PANTOPRAZOLE SODIUM 20 MG/1
8 TABLET, DELAYED RELEASE ORAL
Qty: 80 | Refills: 0 | Status: DISCONTINUED | OUTPATIENT
Start: 2022-01-01 | End: 2022-01-01

## 2022-01-01 RX ORDER — SODIUM CHLORIDE 9 MG/ML
1000 INJECTION INTRAMUSCULAR; INTRAVENOUS; SUBCUTANEOUS ONCE
Refills: 0 | Status: COMPLETED | OUTPATIENT
Start: 2022-01-01 | End: 2022-01-01

## 2022-01-01 RX ORDER — CALCIUM GLUCONATE 100 MG/ML
2 VIAL (ML) INTRAVENOUS ONCE
Refills: 0 | Status: COMPLETED | OUTPATIENT
Start: 2022-01-01 | End: 2022-01-01

## 2022-01-01 RX ORDER — PENTOXIFYLLINE 400 MG/1
400 TABLET, EXTENDED RELEASE ORAL
Qty: 60 | Refills: 0 | Status: ACTIVE | COMMUNITY
Start: 2022-01-01

## 2022-01-01 RX ORDER — SODIUM CHLORIDE 9 MG/ML
1000 INJECTION, SOLUTION INTRAVENOUS ONCE
Refills: 0 | Status: COMPLETED | OUTPATIENT
Start: 2022-01-01 | End: 2022-01-01

## 2022-01-01 RX ORDER — SODIUM CHLORIDE 9 MG/ML
1000 INJECTION, SOLUTION INTRAVENOUS
Refills: 0 | Status: DISCONTINUED | OUTPATIENT
Start: 2022-01-01 | End: 2022-01-01

## 2022-01-01 RX ORDER — DEXTROSE 50 % IN WATER 50 %
12.5 SYRINGE (ML) INTRAVENOUS ONCE
Refills: 0 | Status: DISCONTINUED | OUTPATIENT
Start: 2022-01-01 | End: 2022-01-01

## 2022-01-01 RX ORDER — NOREPINEPHRINE BITARTRATE/D5W 8 MG/250ML
0.05 PLASTIC BAG, INJECTION (ML) INTRAVENOUS
Qty: 16 | Refills: 0 | Status: DISCONTINUED | OUTPATIENT
Start: 2022-01-01 | End: 2022-01-01

## 2022-01-01 RX ORDER — PIOGLITAZONE HYDROCHLORIDE AND METFORMIN HYDROCHLORIDE 15; 850 MG/1; MG/1
15-850 TABLET, FILM COATED ORAL
Refills: 0 | Status: ACTIVE | COMMUNITY

## 2022-01-01 RX ORDER — CHLORHEXIDINE GLUCONATE 213 G/1000ML
15 SOLUTION TOPICAL EVERY 12 HOURS
Refills: 0 | Status: DISCONTINUED | OUTPATIENT
Start: 2022-01-01 | End: 2022-01-01

## 2022-01-01 RX ORDER — PHENYLEPHRINE HYDROCHLORIDE 10 MG/ML
0.5 INJECTION INTRAVENOUS
Qty: 160 | Refills: 0 | Status: DISCONTINUED | OUTPATIENT
Start: 2022-01-01 | End: 2022-01-01

## 2022-01-01 RX ORDER — CLOPIDOGREL BISULFATE 75 MG/1
75 TABLET, FILM COATED ORAL DAILY
Qty: 90 | Refills: 3 | Status: ACTIVE | COMMUNITY
Start: 2022-01-01 | End: 1900-01-01

## 2022-01-01 RX ORDER — INSULIN GLARGINE 100 [IU]/ML
4 INJECTION, SOLUTION SUBCUTANEOUS ONCE
Refills: 0 | Status: COMPLETED | OUTPATIENT
Start: 2022-01-01 | End: 2022-01-01

## 2022-01-01 RX ORDER — OXYCODONE AND ACETAMINOPHEN 5; 325 MG/1; MG/1
1 TABLET ORAL EVERY 8 HOURS
Refills: 0 | Status: DISCONTINUED | OUTPATIENT
Start: 2022-01-01 | End: 2022-01-01

## 2022-01-01 RX ORDER — PANTOPRAZOLE SODIUM 20 MG/1
80 TABLET, DELAYED RELEASE ORAL ONCE
Refills: 0 | Status: COMPLETED | OUTPATIENT
Start: 2022-01-01 | End: 2022-01-01

## 2022-01-01 RX ORDER — NOREPINEPHRINE BITARTRATE/D5W 8 MG/250ML
0.05 PLASTIC BAG, INJECTION (ML) INTRAVENOUS
Qty: 32 | Refills: 0 | Status: DISCONTINUED | OUTPATIENT
Start: 2022-01-01 | End: 2022-01-01

## 2022-01-01 RX ORDER — LINAGLIPTIN 5 MG/1
1 TABLET, FILM COATED ORAL
Qty: 0 | Refills: 0 | DISCHARGE

## 2022-01-01 RX ORDER — ONDANSETRON 8 MG/1
4 TABLET, FILM COATED ORAL EVERY 8 HOURS
Refills: 0 | Status: DISCONTINUED | OUTPATIENT
Start: 2022-01-01 | End: 2022-01-01

## 2022-01-01 RX ORDER — SODIUM CHLORIDE 9 MG/ML
1000 INJECTION INTRAMUSCULAR; INTRAVENOUS; SUBCUTANEOUS
Refills: 0 | Status: DISCONTINUED | OUTPATIENT
Start: 2022-01-01 | End: 2022-01-01

## 2022-01-01 RX ORDER — SODIUM,POTASSIUM PHOSPHATES 278-250MG
1 POWDER IN PACKET (EA) ORAL ONCE
Refills: 0 | Status: DISCONTINUED | OUTPATIENT
Start: 2022-01-01 | End: 2022-01-01

## 2022-01-01 RX ORDER — ACETAMINOPHEN 500 MG
1000 TABLET ORAL ONCE
Refills: 0 | Status: COMPLETED | OUTPATIENT
Start: 2022-01-01 | End: 2022-01-01

## 2022-01-01 RX ORDER — INSULIN LISPRO 100/ML
VIAL (ML) SUBCUTANEOUS EVERY 6 HOURS
Refills: 0 | Status: DISCONTINUED | OUTPATIENT
Start: 2022-01-01 | End: 2022-01-01

## 2022-01-01 RX ORDER — DORZOLAMIDE HYDROCHLORIDE 20 MG/ML
2 SOLUTION OPHTHALMIC
Refills: 0 | Status: ACTIVE | COMMUNITY

## 2022-01-01 RX ORDER — CHLORHEXIDINE GLUCONATE 213 G/1000ML
1 SOLUTION TOPICAL DAILY
Refills: 0 | Status: DISCONTINUED | OUTPATIENT
Start: 2022-01-01 | End: 2022-01-01

## 2022-01-01 RX ORDER — DEXMEDETOMIDINE HYDROCHLORIDE IN 0.9% SODIUM CHLORIDE 4 UG/ML
0.4 INJECTION INTRAVENOUS
Qty: 400 | Refills: 0 | Status: DISCONTINUED | OUTPATIENT
Start: 2022-01-01 | End: 2022-01-01

## 2022-01-01 RX ORDER — FUROSEMIDE 40 MG
40 TABLET ORAL ONCE
Refills: 0 | Status: COMPLETED | OUTPATIENT
Start: 2022-01-01 | End: 2022-01-01

## 2022-01-01 RX ORDER — ROSUVASTATIN CALCIUM 5 MG/1
1 TABLET ORAL
Qty: 0 | Refills: 0 | DISCHARGE

## 2022-01-01 RX ORDER — PIOGLITAZONE HCL AND METFORMIN HCL 850; 15 MG/1; MG/1
1 TABLET ORAL
Qty: 0 | Refills: 0 | DISCHARGE

## 2022-01-01 RX ORDER — BRIMONIDINE TARTRATE, TIMOLOL MALEATE 2; 5 MG/ML; MG/ML
0.2-0.5 SOLUTION/ DROPS OPHTHALMIC
Refills: 0 | Status: ACTIVE | COMMUNITY

## 2022-01-01 RX ORDER — CHLORHEXIDINE GLUCONATE 4 %
400 (240 MG) LIQUID (ML) TOPICAL
Qty: 90 | Refills: 0 | Status: ACTIVE | COMMUNITY
Start: 2022-01-01

## 2022-01-01 RX ORDER — SODIUM BICARBONATE 1 MEQ/ML
0.22 SYRINGE (ML) INTRAVENOUS
Qty: 150 | Refills: 0 | Status: DISCONTINUED | OUTPATIENT
Start: 2022-01-01 | End: 2022-01-01

## 2022-01-01 RX ORDER — PROPOFOL 10 MG/ML
50 INJECTION, EMULSION INTRAVENOUS
Qty: 1000 | Refills: 0 | Status: DISCONTINUED | OUTPATIENT
Start: 2022-01-01 | End: 2022-01-01

## 2022-01-01 RX ORDER — INSULIN LISPRO 100/ML
VIAL (ML) SUBCUTANEOUS
Refills: 0 | Status: DISCONTINUED | OUTPATIENT
Start: 2022-01-01 | End: 2022-01-01

## 2022-01-01 RX ORDER — DEXTROSE 50 % IN WATER 50 %
25 SYRINGE (ML) INTRAVENOUS ONCE
Refills: 0 | Status: COMPLETED | OUTPATIENT
Start: 2022-01-01 | End: 2022-01-01

## 2022-01-01 RX ORDER — DEXTROSE 50 % IN WATER 50 %
15 SYRINGE (ML) INTRAVENOUS ONCE
Refills: 0 | Status: DISCONTINUED | OUTPATIENT
Start: 2022-01-01 | End: 2022-01-01

## 2022-01-01 RX ORDER — GLIMEPIRIDE 1 MG
1 TABLET ORAL
Qty: 0 | Refills: 0 | DISCHARGE

## 2022-01-01 RX ORDER — ALBUMIN HUMAN 25 %
100 VIAL (ML) INTRAVENOUS ONCE
Refills: 0 | Status: COMPLETED | OUTPATIENT
Start: 2022-01-01 | End: 2022-01-01

## 2022-01-01 RX ORDER — ATORVASTATIN CALCIUM 80 MG/1
80 TABLET, FILM COATED ORAL AT BEDTIME
Refills: 0 | Status: DISCONTINUED | OUTPATIENT
Start: 2022-01-01 | End: 2022-01-01

## 2022-01-01 RX ORDER — DIPHENHYDRAMINE HCL 50 MG
25 CAPSULE ORAL ONCE
Refills: 0 | Status: COMPLETED | OUTPATIENT
Start: 2022-01-01 | End: 2022-01-01

## 2022-01-01 RX ORDER — SODIUM CHLORIDE 9 MG/ML
500 INJECTION INTRAMUSCULAR; INTRAVENOUS; SUBCUTANEOUS ONCE
Refills: 0 | Status: COMPLETED | OUTPATIENT
Start: 2022-01-01 | End: 2022-01-01

## 2022-01-01 RX ORDER — FUROSEMIDE 40 MG
40 TABLET ORAL ONCE
Refills: 0 | Status: DISCONTINUED | OUTPATIENT
Start: 2022-01-01 | End: 2022-01-01

## 2022-01-01 RX ORDER — INSULIN GLARGINE 100 [IU]/ML
4 INJECTION, SOLUTION SUBCUTANEOUS
Refills: 0 | Status: DISCONTINUED | OUTPATIENT
Start: 2022-01-01 | End: 2022-01-01

## 2022-01-01 RX ORDER — HYDROCORTISONE 20 MG
100 TABLET ORAL EVERY 8 HOURS
Refills: 0 | Status: DISCONTINUED | OUTPATIENT
Start: 2022-01-01 | End: 2022-01-01

## 2022-01-01 RX ORDER — DEXMEDETOMIDINE HYDROCHLORIDE IN 0.9% SODIUM CHLORIDE 4 UG/ML
0.5 INJECTION INTRAVENOUS
Qty: 200 | Refills: 0 | Status: DISCONTINUED | OUTPATIENT
Start: 2022-01-01 | End: 2022-01-01

## 2022-01-01 RX ORDER — DOBUTAMINE HCL 250MG/20ML
5 VIAL (ML) INTRAVENOUS
Qty: 500 | Refills: 0 | Status: DISCONTINUED | OUTPATIENT
Start: 2022-01-01 | End: 2022-01-01

## 2022-01-01 RX ORDER — PENTOXIFYLLINE 400 MG
1 TABLET, EXTENDED RELEASE ORAL
Qty: 0 | Refills: 0 | DISCHARGE

## 2022-01-01 RX ADMIN — Medication 400 MILLIGRAM(S): at 20:00

## 2022-01-01 RX ADMIN — Medication 6: at 12:49

## 2022-01-01 RX ADMIN — Medication 100 MILLIGRAM(S): at 05:21

## 2022-01-01 RX ADMIN — Medication 50 MILLILITER(S): at 05:07

## 2022-01-01 RX ADMIN — SIMETHICONE 80 MILLIGRAM(S): 80 TABLET, CHEWABLE ORAL at 17:35

## 2022-01-01 RX ADMIN — CHLORHEXIDINE GLUCONATE 15 MILLILITER(S): 213 SOLUTION TOPICAL at 05:54

## 2022-01-01 RX ADMIN — Medication 6: at 00:32

## 2022-01-01 RX ADMIN — Medication 3.18 MICROGRAM(S)/KG/MIN: at 00:04

## 2022-01-01 RX ADMIN — Medication 50 MILLIEQUIVALENT(S): at 03:30

## 2022-01-01 RX ADMIN — Medication 2: at 16:41

## 2022-01-01 RX ADMIN — Medication 6: at 18:14

## 2022-01-01 RX ADMIN — Medication 4: at 23:52

## 2022-01-01 RX ADMIN — PANTOPRAZOLE SODIUM 80 MILLIGRAM(S): 20 TABLET, DELAYED RELEASE ORAL at 22:34

## 2022-01-01 RX ADMIN — SODIUM CHLORIDE 1000 MILLILITER(S): 9 INJECTION INTRAMUSCULAR; INTRAVENOUS; SUBCUTANEOUS at 19:55

## 2022-01-01 RX ADMIN — INSULIN GLARGINE 4 UNIT(S): 100 INJECTION, SOLUTION SUBCUTANEOUS at 22:49

## 2022-01-01 RX ADMIN — PANTOPRAZOLE SODIUM 10 MG/HR: 20 TABLET, DELAYED RELEASE ORAL at 03:15

## 2022-01-01 RX ADMIN — ATORVASTATIN CALCIUM 80 MILLIGRAM(S): 80 TABLET, FILM COATED ORAL at 01:58

## 2022-01-01 RX ADMIN — Medication 50 MILLIEQUIVALENT(S): at 05:22

## 2022-01-01 RX ADMIN — PHENYLEPHRINE HYDROCHLORIDE 6.3 MICROGRAM(S)/KG/MIN: 10 INJECTION INTRAVENOUS at 00:04

## 2022-01-01 RX ADMIN — Medication 50 MILLIEQUIVALENT(S): at 23:30

## 2022-01-01 RX ADMIN — Medication 200 GRAM(S): at 08:15

## 2022-01-01 RX ADMIN — VASOPRESSIN 2.4 UNIT(S)/MIN: 20 INJECTION INTRAVENOUS at 10:34

## 2022-01-01 RX ADMIN — Medication 100 GRAM(S): at 15:00

## 2022-01-01 RX ADMIN — Medication 200 GRAM(S): at 04:34

## 2022-01-01 RX ADMIN — Medication 5.09 MICROGRAM(S)/KG/MIN: at 05:53

## 2022-01-01 RX ADMIN — PANTOPRAZOLE SODIUM 10 MG/HR: 20 TABLET, DELAYED RELEASE ORAL at 00:05

## 2022-01-01 RX ADMIN — PROPOFOL 20.4 MICROGRAM(S)/KG/MIN: 10 INJECTION, EMULSION INTRAVENOUS at 00:33

## 2022-01-01 RX ADMIN — ATORVASTATIN CALCIUM 80 MILLIGRAM(S): 80 TABLET, FILM COATED ORAL at 23:43

## 2022-01-01 RX ADMIN — Medication 10 MILLIGRAM(S): at 17:40

## 2022-01-01 RX ADMIN — Medication 100 MEQ/KG/HR: at 00:04

## 2022-01-01 RX ADMIN — PHENYLEPHRINE HYDROCHLORIDE 6.3 MICROGRAM(S)/KG/MIN: 10 INJECTION INTRAVENOUS at 10:34

## 2022-01-01 RX ADMIN — CHLORHEXIDINE GLUCONATE 1 APPLICATION(S): 213 SOLUTION TOPICAL at 10:38

## 2022-01-01 RX ADMIN — Medication 6: at 10:19

## 2022-01-01 RX ADMIN — PANTOPRAZOLE SODIUM 10 MG/HR: 20 TABLET, DELAYED RELEASE ORAL at 10:35

## 2022-01-01 RX ADMIN — Medication 25 MILLIGRAM(S): at 20:00

## 2022-01-01 RX ADMIN — OXYCODONE AND ACETAMINOPHEN 1 TABLET(S): 5; 325 TABLET ORAL at 22:03

## 2022-01-01 RX ADMIN — Medication 100 MEQ/KG/HR: at 10:34

## 2022-01-01 RX ADMIN — Medication 25 MILLILITER(S): at 16:05

## 2022-01-01 RX ADMIN — Medication 2: at 07:06

## 2022-01-01 RX ADMIN — SODIUM CHLORIDE 1000 MILLILITER(S): 9 INJECTION INTRAMUSCULAR; INTRAVENOUS; SUBCUTANEOUS at 05:00

## 2022-01-01 RX ADMIN — Medication 4: at 01:58

## 2022-01-01 RX ADMIN — SODIUM CHLORIDE 4000 MILLILITER(S): 9 INJECTION, SOLUTION INTRAVENOUS at 15:00

## 2022-01-01 RX ADMIN — PANTOPRAZOLE SODIUM 10 MG/HR: 20 TABLET, DELAYED RELEASE ORAL at 14:00

## 2022-01-01 RX ADMIN — SODIUM CHLORIDE 75 MILLILITER(S): 9 INJECTION INTRAMUSCULAR; INTRAVENOUS; SUBCUTANEOUS at 02:34

## 2022-01-01 RX ADMIN — OXYCODONE AND ACETAMINOPHEN 1 TABLET(S): 5; 325 TABLET ORAL at 04:46

## 2022-01-01 RX ADMIN — INSULIN HUMAN 5 UNIT(S): 100 INJECTION, SOLUTION SUBCUTANEOUS at 16:05

## 2022-01-01 RX ADMIN — Medication 2: at 11:43

## 2022-01-01 RX ADMIN — Medication 40 MILLIGRAM(S): at 08:40

## 2022-01-01 RX ADMIN — OXYCODONE AND ACETAMINOPHEN 1 TABLET(S): 5; 325 TABLET ORAL at 03:26

## 2022-01-01 RX ADMIN — PANTOPRAZOLE SODIUM 10 MG/HR: 20 TABLET, DELAYED RELEASE ORAL at 11:44

## 2022-01-01 RX ADMIN — VASOPRESSIN 2.4 UNIT(S)/MIN: 20 INJECTION INTRAVENOUS at 00:04

## 2022-01-01 RX ADMIN — SODIUM CHLORIDE 1000 MILLILITER(S): 9 INJECTION, SOLUTION INTRAVENOUS at 10:24

## 2022-01-01 RX ADMIN — PHENYLEPHRINE HYDROCHLORIDE 6.3 MICROGRAM(S)/KG/MIN: 10 INJECTION INTRAVENOUS at 08:30

## 2022-06-24 PROBLEM — I87.2 CHRONIC VENOUS INSUFFICIENCY: Status: ACTIVE | Noted: 2022-01-01

## 2022-06-24 PROBLEM — Z95.828: Status: ACTIVE | Noted: 2022-01-01

## 2022-06-24 PROBLEM — R60.9 2+ PITTING EDEMA: Status: ACTIVE | Noted: 2022-01-01

## 2022-06-24 PROBLEM — M79.604 ACHING LEG SYNDROME OF RIGHT LOWER EXTREMITY: Status: ACTIVE | Noted: 2022-01-01

## 2022-06-24 PROBLEM — M79.605 ACHING LEG SYNDROME OF LEFT LOWER EXTREMITY: Status: ACTIVE | Noted: 2022-01-01

## 2022-06-24 NOTE — ASSESSMENT
[FreeTextEntry1] : It appears that patient has a femorofemoral bypass and right femoropopliteal bypass with right lower extremity swelling and ischemia of the left foot.  The right lower extremity swelling is chronic but need to rule out DVT. [Arterial/Venous Disease] : arterial/venous disease

## 2022-06-24 NOTE — PHYSICAL EXAM
[JVD] : no jugular venous distention  [Normal Thyroid] : the thyroid was normal [Normal Breath Sounds] : Normal breath sounds [Right Carotid Bruit] : no bruit heard over the right carotid [Left Carotid Bruit] : no bruit heard over the left carotid [2+] : right 2+ [Right Femoral Bruit] : no bruit heard over the right femoral artery [Left Femoral Bruit] : no bruit heard over the left femoral artery [1+] : right 1+ [0] : left 0 [Ankle Swelling (On Exam)] : present [Ankle Swelling On The Left] : moderate [] : of the right leg [Ankle Swelling On The Right] : mild [Abdomen Masses] : No abdominal masses [Abdomen Tenderness] : ~T ~M No abdominal tenderness [Tender] : nontender [Enlarged] : not enlarged [No Rash or Lesion] : No rash or lesion [Alert] : alert [Oriented to Person] : oriented to person [Oriented to Place] : oriented to place [Oriented to Time] : oriented to time [de-identified] : Well-built well-nourished [de-identified] : Normal [de-identified] : Normal

## 2022-06-24 NOTE — HISTORY OF PRESENT ILLNESS
[FreeTextEntry1] : This is a 76-year-old male who has been referred by Dr. Candelaria for pain in both lower extremities.  The patient complains of pain in the right calf and right leg and left foot.  Patient has a's history of vascular bypasses in the leg as follows.\par Patient had a femorofemoral bypass performed in 2011 or 2012 for pain and discomfort in the right lower extremity.  In 2017 he had a right femoral-popliteal bypass performed and both surgeries were done at [facility].  Since the surgery on the right leg he has been having chronic swelling of the right leg and pain and discomfort in the right calf does not have any significant pain in the right foot.  Recently he has noticed pain in his left foot and makes it difficult for him to walk and walks slowly with a cane and experiences significant pain in the left foot especially at night.

## 2022-07-05 NOTE — ED PROVIDER NOTE - NSICDXPASTSURGICALHX_GEN_ALL_CORE_FT
PAST SURGICAL HISTORY:  Femoral fracture Right & had surgery ( 1997 )    History of inferior vena caval filter placement Mark Filter    S/P appendectomy     S/P femoral-popliteal bypass surgery

## 2022-07-05 NOTE — ED PROVIDER NOTE - PATIENT PORTAL LINK FT
You can access the FollowMyHealth Patient Portal offered by [facility] by registering at the following website: [email or web address]. By joining [facility]’s FollowMyHealth portal, you will also be able to view your health information using other applications (apps) compatible with our system.

## 2022-07-05 NOTE — ED PROVIDER NOTE - CARE PROVIDER_API CALL
Bob Porras)  Surgery  [address], [address]  [place], [place] [id number]  Phone: [phone]  Fax: [phone]  Follow Up Time:

## 2022-07-05 NOTE — ED ADULT NURSE REASSESSMENT NOTE - NS ED NURSE REASSESS COMMENT FT1
VSS, patient reporting slight increase in pain in his lower extremities. Pt ambulatory in triage no signs of distress.
Pt received lying in stretcher. AOx4 and ambulatory with cane use, v/s as documented and does not appear in any acute distress. Respirations appear equal and unlabored. Pt denies headache, N/V. Discharge paperwork reviewed, signed and received.

## 2022-07-05 NOTE — ED PROVIDER NOTE - PHYSICAL EXAMINATION
Gen: Alert, NAD  Head: NC, AT   Eyes: PERRL, EOMI, normal lids/conjunctiva  ENT: normal hearing, patent oropharynx without erythema/exudate, uvula midline  Neck: supple, no tenderness, Trachea midline  Pulm: Bilateral BS, normal resp effort, no wheeze/stridor/retractions  CV: RRR, no M/R/G, 2+ radial , no edema, no DP on either foot  Abd: soft, NT/ND, +BS, no hepatosplenomegaly  Mskel: extremities x4 with normal ROM and no joint effusions. no ctl spine ttp. +right leg peripheral edema knee to foot, +left foot appears cold.   Skin: no rash, no bruising   Neuro: AAOx3, no sensory/motor deficits, CN 2-12 intact

## 2022-07-05 NOTE — ED PROVIDER NOTE - OBJECTIVE STATEMENT
76 year old male with PMH of DM and HTN who presents to the ED for left leg swelling and poor blood circulation to bilateral feet. Pt reports left and right leg has been swollen for about 2 months prompting visit to the ED for an evaluation. Pt is concerned for blood clots as he had a recent travel history. Pt denies nausea, fevers, chills, vomiting, CP, and SOB.

## 2022-07-05 NOTE — ED PROVIDER NOTE - NSFOLLOWUPINSTRUCTIONS_ED_ALL_ED_FT
Follow up with your primary vascular surgeon as scheduled.     Rest, drink plenty of fluids.  Advance activity as tolerated.  Continue all previously prescribed medications as directed.  Follow up with your PMD 2-3 days and bring copies of your results.  Return to the ER for worsening symptoms, numbness/tingling, cold legs, fevers, discolored foot, increased pain, or new concerning symptoms.     Take acetaminophen 650 mg orally every 6-8 hours for pain control as needed. Please do not exceed 4,000 mg of acetaminophen during a 24 hours period. Acetaminophen can be found in many over-the-counter cold medications as well as opioid medications that may be given for pain.    Take ibuprofen (also known as MOTRIN or ADVIL) 400 mg orally every 6-8 hours for pain control as needed with food to avoid an upset stomach. Ibuprofen can be found in many over-the-counter medications. Please do not take ibuprofen if you have a bleeding disorder, stomach or gastrointestinal ulcer, or liver disease.    If needed, you can alternate these medications so that you can take one medication every 3 hours. For example, at noon take ibuprofen, then at 3PM take acetaminophen, then at 6PM take ibuprofen.     Please take one tablet of PERCOCET every 8 hours as needed for SEVERE pain. Please do not take this medication unless you absolutely need it, as it is very addictive. Please do not drive, operate heavy machinery, or make important decisions while on this medication as it can cloud your judgement.

## 2022-07-05 NOTE — ED PROVIDER NOTE - PROGRESS NOTE DETAILS
RICKY EVANS: patient ambulatory and has follow up with primary vascular surgeon, will provide pain control prn and follow up with vascular surgery for definitive treatment. Otherwise arterial duplex showing severe stenosis. negative for dvt. I have discussed with the patient about the ED workup, lab results, diagnostics results, plan for discharge home, need for follow-up with primary care physician/specialists, and return precautions. At this time, the patient does not require further workup in the ED. The patient is subjectively feeling better and would like to be discharged home. The patient had the opportunity to ask questions and I have answered all inquiries. The patient verbalizes understanding and agreement with the plan. The patient is hemodynamically stable, clinically well-appearing, ambulatory, mentating well and ready for discharge home.

## 2022-07-05 NOTE — ED PROVIDER NOTE - CARE PLAN
[FreeTextEntry1] : iX [facility]\par  1 Principal Discharge DX:	PVD (peripheral vascular disease)

## 2022-07-05 NOTE — ED ADULT NURSE NOTE - OBJECTIVE STATEMENT
as per patient c/o R Leg pain and swelling and L foot pain. Pt here last week for same, has a follow-up but is concerned regarding circulation in his foot.   Pt is axo4, ambulatory w/ cane. Swelling, non-pitting noted to R leg, no redness of skin, cap refills <2 bilaterally, pulses +2 bilaterally.

## 2022-07-05 NOTE — ED PROVIDER NOTE - NSICDXPASTMEDICALHX_GEN_ALL_CORE_FT
PAST MEDICAL HISTORY:  Diabetes mellitus     Fracture of femur Had surgery (1997 )    Glaucoma     Hypertension

## 2022-07-08 PROBLEM — I73.9 ARTERIAL INSUFFICIENCY OF LOWER EXTREMITY: Status: ACTIVE | Noted: 2022-01-01

## 2022-07-08 PROBLEM — Z78.9 NON-SMOKER: Status: ACTIVE | Noted: 2022-01-01

## 2022-07-08 PROBLEM — E78.00 HIGH CHOLESTEROL: Status: ACTIVE | Noted: 2022-01-01

## 2022-07-08 NOTE — ASSESSMENT
[FreeTextEntry1] : Patient with severe peripheral vascular disease and left foot ischemia.\par \par Femoral-femoral bypass and right femoropopliteal bypass are patent with no significant stenosis.\par \par Patient currently is not on any antiplatelet therapy.  Recommend aspirin and Plavix and continuation of atorvastatin.\par \par Will arrange for CT angiogram to further evaluate the aortoiliac disease.  Patient will require left leg angiogram subsequent to that with possible intervention.\par \par This was all discussed with the patient and the patient's wife in detail.\par \par Follow-up after the CT angiogram with carotid duplex.

## 2022-07-08 NOTE — CONSULT LETTER
[Dear  ___] : Dear  [unfilled], [Consult Letter:] : I had the pleasure of evaluating your patient, [unfilled]. [Please see my note below.] : Please see my note below. [Consult Closing:] : Thank you very much for allowing me to participate in the care of this patient.  If you have any questions, please do not hesitate to contact me. [Sincerely,] : Sincerely, [FreeTextEntry3] : Terry Mercado M.D., F.SIDNEYS., R.P.DEVIN.I.\par  of Vascular Surgery\par Assistant Professor of Radiology\par Director of Endovascular Program/ Vascular Access Center\par [facility]

## 2022-07-08 NOTE — PHYSICAL EXAM
[JVD] : no jugular venous distention  [Normal Breath Sounds] : Normal breath sounds [Normal Heart Sounds] : normal heart sounds [2+] : left 2+

## 2022-07-08 NOTE — HISTORY OF PRESENT ILLNESS
[FreeTextEntry1] : Patient is a 76-year-old gentleman with past medical history significant for severe peripheral vascular disease, status post left right femoral-femoral bypass with right femoral-popliteal bypass graft in the past, history of femur fracture requiring multiple operations to fix the femur in 1990s, history of diabetes, hypercholesterolemia, former smoker who quit in 1996 presenting to us with complaints of severe left foot rest pain.  No history of tissue loss.  Pain has been present for the past year but recently have been getting much worse.  Patient cannot sleep at night secondary to the pain.  No history of cardiac disease.  No history of stroke.

## 2022-07-15 PROBLEM — I70.229: Status: ACTIVE | Noted: 2022-01-01

## 2022-07-15 NOTE — ASSESSMENT
[FreeTextEntry1] : Patient with severe peripheral vascular disease and left foot ischemia.\par \par Femoral-femoral bypass and right femoropopliteal bypass are patent with no significant stenosis.\par \par Patient currently is not on any antiplatelet therapy.  Recommend aspirin and Plavix and continuation of atorvastatin.\par \par CTA was done.  CTA shows there is severe stenosis about 2 cm proximal to the proximal anastomosis of the femoral-femoral bypass in the left external/proximal left common femoral artery.  Patient also has severe distal SFA and popliteal disease.  Patient needs more accurate and exact anatomical evaluation of arterial system.  We will schedule for left leg angiogram with possible intervention.

## 2022-07-16 NOTE — ED ADULT TRIAGE NOTE - CHIEF COMPLAINT QUOTE
Patient brought to ER by family for coffee colored vomitus that started yesterday. Before he came in his v/s were HR80/30 HR 30s and after he vomited in car he started to feel  better. Pt had imaging with dye on Thursday because he has to have his arteries in his leg unclogged and ever since Friday he has been vomiting thick dark vomitus. Type 2 DM . Pt only on baby aspirin.

## 2022-07-16 NOTE — ED PROVIDER NOTE - OBJECTIVE STATEMENT
76 Y M H/O LE PVD, NIDDM, HTN, presenting with hematemesis. Patient states intermittent episodes of hematemesis since last night. associated coffee-ground emesis, denies any history of similar episodes in the past, 1 year ago colonoscopy, endoscopy. Denies any associated chest pain, weakness, abdominal pain, hematochezia, nausea, vomiting.

## 2022-07-16 NOTE — ED ADULT NURSE REASSESSMENT NOTE - NS ED NURSE REASSESS COMMENT FT1
report was received from MEGHAN LUCIAON pt  was received in room  11 in the ED, aaox4, breathing freely on room in no acute distress. pt is attached to cardiac monitor and pulse oximetry for continuos monitoring. pt is able to speak In full and complete sentences.  daughter is at bed side. will continue care and monitor.

## 2022-07-16 NOTE — ED PROVIDER NOTE - PROGRESS NOTE DETAILS
VALERI: patient hypotensive and with dropping Hgb. Will consult MICU. Nicholas Doshi MD:  MICU evaluated patient, improved BP with fluid bolus, will monitor Nicholas Doshi MD:  Reevaluated throughout the last 5-6 hours s/p x1 unit, improved Hgb, BP stable consistently ~ MAP 70 or greater. no episodes of AMS, weakness. Evaluated by MICU and deemed stable for floor, discussed with hospitalist.

## 2022-07-16 NOTE — ED PROVIDER NOTE - CLINICAL SUMMARY MEDICAL DECISION MAKING FREE TEXT BOX
76 Y M presenting with hematemesis, no n/v hematemesis at this time, clinically stable with /98, mild tachycardia, will re-evalaute, assess Hgb, fluid bolus, likely admit for GI evaluation hgb monitoring.

## 2022-07-16 NOTE — ED ADULT NURSE NOTE - SUICIDE SCREENING DEPRESSION
-- Message is from the [facility]--    Reason for Call: patient mother would like doctor to give him a call back in regards to the tubes that was placed in patient ears back in November of 2018,need to know if there is any restrictions for patient,patient need a call back as soon as possible because they are going on vacation on 03/25/19    Caller Information       Type Contact Phone    03/22/2019 12:43 PM Phone (Incoming) ROEM JUAREZ (Mother) [phone] (H)          Alternative phone number: n/a    Turnaround time given to caller:   \"This message will be sent to [state Provider's name]. The clinical team will fulfill your request as soon as they review your message.\"    
Negative

## 2022-07-16 NOTE — ED PROVIDER NOTE - NS ED ROS FT
GENERAL: No fever or chills  EYES: No change in vision  HEENT: No trouble swallowing or speaking  CARDIAC: No chest pain  PULMONARY: No cough or SOB  GI: No abdominal pain, no nausea or no vomiting, no diarrhea or constipation, + hematemesis  : No changes in urination  SKIN: No rashes  NEURO: No headache, no numbness  MSK: No joint pain  Otherwise as HPI or negative.

## 2022-07-16 NOTE — CONSULT NOTE ADULT - ASSESSMENT
77 y/o M with PMH of T2DM, HTN, HLD, severe peripheral vascular disease, status post left right femoral-femoral bypass with right femoral-popliteal bypass graft in the past, history of femur fracture requiring multiple operations to fix the femur in 1990s, former smoker who quit in 1996 presenting with two episodes of hematemesis. First episode was Friday night, last episode was Saturday AM, vomited approximately 1 cup of coffee ground emesis per pt. MICU consulted for hypotension 2/2 UGIB.    #UGIB, hypotension  -Pt's BPs improved to 117/80, MAPs in 100s-110s at bedside.  -Hgb 7.9 -> 6.6, ordered for 1U pRBC in ED  -check post transfusion CBC, transfuse Hgb < 7, check serial CBCs q2  -would consult GI for EGD and other potential procedural intervention  -monitor BPs      Patient is currently not a MICU candidate at this time. Please call us back with questions.

## 2022-07-16 NOTE — ED ADULT NURSE NOTE - OBJECTIVE STATEMENT
Patient arrives to the ED after vomiting blood since last night. A&Ox4 but a poor historian. Patient's cousin at bedside reports he was weak and curled up in a ball. Patient's VS in triage note. Patient vomited foamy coffee ground emesis in the car and felt better after. Patient denies any headache, chest pain, SOB, dizziness, nausea, vomiting, diarrhea, or any other complaints at this time.   Patient reports he had a CT two days ago of his legs with contrast and since then they have not felt right. Patient reports he was told the vessels in his right leg and left foot were narrowing. Patient reports he had surgery to the artery of the right leg in 2017 and it has been swollen and uncomfortable since. Difficulty obtaining pedal pulses to bilateral feet, 1+ pitting edema noted to bilateral feet. Dr. Holbrook made aware. Belly soft, nondistended, and nontender. 20g IV placed to right wrist. Labs sent as ordered. COVID swab sent. Fluids administered. Cardiac monitor in place- sinus tach. Dr. Holbrook at bedside for evaluation. Safety maintained. Patient stable upon exiting the room.

## 2022-07-16 NOTE — ED PROVIDER NOTE - ATTENDING CONTRIBUTION TO CARE
Dr. Rhoades, Attending Physician-  I performed a face to face bedside interview with patient regarding history of present illness, review of symptoms and past medical history. I completed an independent physical exam.  I have discussed patient's plan of care with the resident.    76M, PVD, DM, HTN who presents with dark vomitus. For the past 24 hours, reports multiple episodes of dark, coffee ground vomiting. No blood thinners. Has not taken ASA over the past 24 hours. Remote colonoscopy/EGD. On ROS, feels week. No headaches, fevers, chills, cough, sputum, cp, sob, abdominal discomfort. Physical: afebrile, mmm, tachycardic, SBPs 90-110s, abdomen soft, no le edema. Plan: labs, COVID, IVF. Anticipate admission for potential GI bleed.

## 2022-07-16 NOTE — ED ADULT NURSE NOTE - NSIMPLEMENTINTERV_GEN_ALL_ED
Implemented All Fall Risk Interventions:  [place] to call system. Call bell, personal items and telephone within reach. Instruct patient to call for assistance. Room bathroom lighting operational. Non-slip footwear when patient is off stretcher. Physically safe environment: no spills, clutter or unnecessary equipment. Stretcher in lowest position, wheels locked, appropriate side rails in place. Provide visual cue, wrist band, yellow gown, etc. Monitor gait and stability. Monitor for mental status changes and reorient to person, place, and time. Review medications for side effects contributing to fall risk. Reinforce activity limits and safety measures with patient and family.

## 2022-07-17 NOTE — H&P ADULT - PROBLEM SELECTOR PLAN 2
creatinine on admission 2.53, from 2.21 7/2022. Suspect pre-renal from GI bleed vs ATN from hypotension.  -trend BMP  -check urine lytes  -avoid nephrotoxic agents  -strict I&O

## 2022-07-17 NOTE — H&P ADULT - NSHPPHYSICALEXAM_GEN_ALL_CORE
.  VITAL SIGNS:  T(C): 36.7 (07-17-22 @ 08:49), Max: 37.3 (07-17-22 @ 03:48)  T(F): 98 (07-17-22 @ 08:49), Max: 99.2 (07-17-22 @ 03:48)  HR: 99 (07-17-22 @ 08:49) (90 - 121)  BP: 113/64 (07-17-22 @ 08:49) (77/44 - 125/98)  BP(mean): 72 (07-17-22 @ 08:49) (55 - 73)  RR: 16 (07-17-22 @ 08:49) (15 - 24)  SpO2: 100% (07-17-22 @ 08:49) (98% - 100%)  Wt(kg): --    PHYSICAL EXAM:    Constitutional: WDWN resting comfortably in bed; NAD  Head: NC/AT  Eyes: PERRL, EOMI, anicteric sclera  ENT: no nasal discharge; uvula midline, no oropharyngeal erythema or exudates; MMM  Neck: supple; no JVD or thyromegaly  Respiratory: CTA B/L; no W/R/R, no retractions  Cardiac: +S1/S2; RRR; no M/R/G; PMI non-displaced  Gastrointestinal: abdomen soft, NT/ND; no rebound or guarding; +BSx4  Back: spine midline, no bony tenderness or step-offs; no CVAT B/L  Extremities: WWP, no clubbing or cyanosis; no peripheral edema  Musculoskeletal: NROM x4; no joint swelling, tenderness or erythema  Dermatologic: skin warm, dry and intact; no rashes, wounds, or scars  Lymphatic: no submandibular or cervical LAD  Neurologic: AAOx3; CNII-XII grossly intact; no focal deficits  Psychiatric: affect and characteristics of appearance, verbalizations, behaviors are appropriate

## 2022-07-17 NOTE — ED ADULT NURSE REASSESSMENT NOTE - NS ED NURSE REASSESS COMMENT FT1
Break cover RN. Pt AOX4, Pt reports "I feel great", no chest pain or SOB, placed on CM, respirations even and unlabored. Will continue to monitor.

## 2022-07-17 NOTE — CHART NOTE - NSCHARTNOTEFT_GEN_A_CORE
Reviewed repeated CBC, patient did not responded appropriate to second blood transfusion. Hgb 7.2 post transfusion from 7.3. Creatinine improved. V/S remains slightly tachycardic at 102, BP 98/83. No more episodes of hematemesis. Seen by GI, plans for EGD tomorrow. Will transfuse another 1pRBCs, repeat CBC post transfusion.

## 2022-07-17 NOTE — H&P ADULT - PROBLEM SELECTOR PLAN 4
on home orals: glimepiride, linagliptin, metformin-pioglitazone  -check a1c  -sliding scale with glucose checks

## 2022-07-17 NOTE — ED ADULT NURSE REASSESSMENT NOTE - NS ED NURSE REASSESS COMMENT FT1
Received report from night RN. Pt is A&Ox4, ambulatory at baseline. Pt complains of vomiting blood last night, noted coffee ground emesis. PMH glaucoma, diabetes, hypertension, colonoscopy/endoscopy x1year. Pt currently transfusing since ~530. Blood transfusion completed at this time. No transfusion reaction noted. Consent was verified and in chart. Denies CP, SOB, nausea, vomiting since admit, diarrhea. Pt complains of chronic leg pain r/t bilateral leg occlusion. Pedal pulses weak. Feet slightly swollen. Pt resting comfortably at this time, offers no other complaints. NSR on monitor. VSS. Respirations even and unlabored. Pt resting comfortably, offers no complaints at this time.

## 2022-07-17 NOTE — ED ADULT NURSE REASSESSMENT NOTE - NS ED NURSE REASSESS COMMENT FT1
Received report from emery RN. Pt is resting comfortably, offers no complaints at this time. Pt receiving 3rd tx. Consent verified. No tx reaction. Blood glucose taken. Medication given. Awaiting scans, labs, further orders from provider.

## 2022-07-17 NOTE — H&P ADULT - ASSESSMENT
76 y.o male with DM, HTN, HLD, PVD on dual antiplatelet therapy who presents for two episodes of coffee ground emesis, found to be hypotensive and Hgb 7.9 from 14 2015, concerning for GI bleed. Now stable after IVFs and 2pRBCs.

## 2022-07-17 NOTE — ED ADULT NURSE REASSESSMENT NOTE - NS ED NURSE REASSESS COMMENT FT1
blood transfusion started at @ 2:33am  blood consent on file. blood verified with MEGHAN LUCIANO PT denies, sob , chest pain , back pain  at this time. will pt attached to cardiac monitor and pulse oximetry for continuos monitoring.

## 2022-07-17 NOTE — ED ADULT NURSE REASSESSMENT NOTE - NS ED NURSE REASSESS COMMENT FT1
care hand off given to break coverage  MEGHAN Bryson , pt pending 2nd  unit of blood , rn will call blood bank

## 2022-07-17 NOTE — CONSULT NOTE ADULT - ASSESSMENT
Impression:  77 y/o M with PMH of T2DM, HTN, HLD, severe peripheral vascular disease, status post left right femoral-femoral bypass with right femoral-popliteal bypass graft in the past, history of femur fracture requiring multiple operations to fix the femur in 1990s, former smoker who quit in 1996 presenting with two episodes of coffee ground emesis, found to be tachycardic, stable BP, Hg 7.9    # coffee ground emesis - likely upper GI bleed as evidenced by worsening anemia and elevated BUN, ddx includes PUD, esophagitis/gastritis/duodenitis; less likely is masses or dieulafoy lesion. No evidence of portal HTN to suggest varcies/portal HTN gastropathy as etiology.     Recommendations:  - pantoprazole drip 8mg/hr  - trend CBC q 8 hours, transfuse for goal>7  - maintain two large bore IV, active T&S  - clear liquid diet today, NPO at MN for EGD tomorrow  - rest of care per primary team    GI will continue to follow.     All recommendations are tentative until note is attested by attending.     Willy Vela, PGY6  Gastroenterology/Hepatology Fellow  During weekdays: Available on [employer] Teams or pager:[phone] ([facility] Short Range Pager); [phone] (Long Range Pager)  Overnight/Weekend: Please page the on-call GI fellow

## 2022-07-17 NOTE — H&P ADULT - NSHPLABSRESULTS_GEN_ALL_CORE
.  LABS:                         7.3    5.95  )-----------( 173      ( 17 Jul 2022 04:30 )             22.2     07-16    137  |  104  |  114<H>  ----------------------------<  158<H>  5.3   |  17<L>  |  2.53<H>    Ca    9.8      16 Jul 2022 20:30    TPro  6.4  /  Alb  3.5  /  TBili  0.3  /  DBili  x   /  AST  14  /  ALT  7   /  AlkPhos  44  07-16    PT/INR - ( 16 Jul 2022 20:30 )   PT: 13.1 sec;   INR: 1.13 ratio         PTT - ( 16 Jul 2022 20:30 )  PTT:28.7 sec              RADIOLOGY, EKG & ADDITIONAL TESTS: Reviewed.

## 2022-07-17 NOTE — ED ADULT NURSE REASSESSMENT NOTE - NS ED NURSE REASSESS COMMENT FT1
Break Coverage RN: Pt alert and awake, no complaints, NAD, respirations are even and unlabored, CM in progress, 2nd unit of PRBCs initiated at 05:30, Safety precautions implemented as per protocol, awaiting further MD orders, will continue to monitor.

## 2022-07-17 NOTE — H&P ADULT - PROBLEM SELECTOR PLAN 1
Presented with 2 episodes of coffee ground emesis in the setting of dual antiplatelet therapy- aspirin and plavix. Here initially hypotensive as low as 77/44 with Hgb of 7.9 from 14.9 in 2015. Responded to IVFs and blood transfusions. BP recovered to 100s/60s. Hgb trend 7.9-->6.6, received 1pRBC--> 7.3, received another 1pRBCs  -repeat CBC and transfuse to keep Hgb>7  -on PPI drip  -2 large bore IVs  -GI consult for EGD  -NPO for now  -holding aspirin/plavix Presented with 2 episodes of coffee ground emesis in the setting of dual antiplatelet therapy- aspirin and plavix. Here initially hypotensive as low as 77/44 with Hgb of 7.9 from 14.9 in 2015. Responded to IVFs and blood transfusions. BP recovered to 100s/60s. Hgb trend 7.9-->6.6, received 1pRBC--> 7.3, received another 1pRBCs  -repeat CBC and transfuse to keep Hgb>7  -on PPI drip  -2 large bore IVs  -GI consult for EGD: email sent  -NPO for now  -holding aspirin/plavix

## 2022-07-17 NOTE — ED ADULT NURSE REASSESSMENT NOTE - NS ED NURSE REASSESS COMMENT FT1
Order noted for discharge. Via  rubia ([phone]) discharge instructions reviewed. Patient received [facility] paper work. Left FA 22 G PIV removed catheter intact covered with gauze and secured with coban. Patient tolerated removal well. Patient able to ambulate with no assistance needed. Will monitor.   patient has been reassessed  after 15 mins of transfusion VS are stable. pt denies sob, back pain, chest pain or any adverse reaction at this time. as per MD godoy  blood product is being transfused over 1hr.will  pt attached to cardiac monitor and pulse oximetry for continuos monitoring.

## 2022-07-17 NOTE — ED ADULT NURSE REASSESSMENT NOTE - NS ED NURSE REASSESS COMMENT FT1
Tx complete. No tx reaction. VSS and noted. Pt resting comfortably. Offers no complaints at this time. Tx complete. No tx reaction. Sinus tachy on monitor. VSS and noted. RR even and unlabored. Pt resting comfortably. Offers no complaints at this time.

## 2022-07-17 NOTE — H&P ADULT - NSHPREVIEWOFSYSTEMS_GEN_ALL_CORE
REVIEW OF SYSTEMS:    CONSTITUTIONAL: No weakness, fevers or chills  EYES/ENT: No visual changes;  No vertigo or throat pain   NECK: No pain or stiffness  RESPIRATORY: No cough, wheezing, hemoptysis; No shortness of breath  CARDIOVASCULAR: No chest pain or palpitations  GASTROINTESTINAL: No abdominal or epigastric pain.+ hematemesis; No diarrhea or constipation. No melena or hematochezia.  GENITOURINARY: No dysuria, frequency or hematuria  NEUROLOGICAL: No numbness or weakness  SKIN: No itching, rashes

## 2022-07-17 NOTE — ED ADULT NURSE REASSESSMENT NOTE - NS ED NURSE REASSESS COMMENT FT1
blood bank form has been released spoke with luann from blood bank  blood will be sent up in 15 mins  to station # [phone].

## 2022-07-17 NOTE — H&P ADULT - HISTORY OF PRESENT ILLNESS
76 y.o male with DM, HTN, HLD, PVD s/p left right femoral-femoral bypass with right femoral-popliteal bypass graft, hx of femur fracture s/p surgical interventions who presents for two episodes of coffee ground emesis.     Patient report Friday night 7/15 he had a episode of vomiting black content. Saturday morning 7/16 when he was getting ready to come to the hospital, had another episode of dark emesis, about 2 big cups of dark content. He report this has never happen before. Denies any recent naproxen/ibuprofen or other NSAID use besides his daily baby aspirin. But endorses PCP prescribed a new medicine about 1 week ago and he was told to take everyday as the aspirin. Report no abdominal pain. He attempted to have something to eat Saturday morning however could not keep anything down. He endorses a prior EGD/colonoscopy but thinks this was on 2015, and was negative. Has been having regular bowel movements. He denies any dyspepsia, dark stools, hematochezia, shortness of breath, chest pain, dizziness, alcohol use, nausea.     Reviewed dispense report, patient got dispensed plavix 75mg on July 8/2020.

## 2022-07-18 NOTE — PROGRESS NOTE ADULT - SUBJECTIVE AND OBJECTIVE BOX
Patient is a 76y old  Male who presents with a chief complaint of Upper GI bleed (18 Jul 2022 13:48)      INTERVAL HPI/OVERNIGHT EVENTS:  T(C): 36.5 (07-18-22 @ 16:31), Max: 37.2 (07-17-22 @ 22:47)  HR: 102 (07-18-22 @ 16:31) (90 - 125)  BP: 102/50 (07-18-22 @ 16:31) (83/61 - 136/68)  RR: 18 (07-18-22 @ 16:31) (16 - 18)  SpO2: 100% (07-18-22 @ 16:31) (99% - 100%)  Wt(kg): --  I&O's Summary      PAST MEDICAL & SURGICAL HISTORY:  Hypertension      Diabetes mellitus      Glaucoma      Fracture of femur  Had surgery (1997 )      Hyperlipidemia      S/P appendectomy      S/P femoral-popliteal bypass surgery      History of inferior vena caval filter placement  Mark Filter      Femoral fracture  Right &amp; had surgery ( 1997 )          SOCIAL HISTORY  Alcohol:  Tobacco:  Illicit substance use:    FAMILY HISTORY:    REVIEW OF SYSTEMS:  CONSTITUTIONAL: No fever, weight loss, or fatigue  EYES: No eye pain, visual disturbances, or discharge  ENMT:  No difficulty hearing, tinnitus, vertigo; No sinus or throat pain  NECK: No pain or stiffness  RESPIRATORY: No cough, wheezing, chills or hemoptysis; No shortness of breath  CARDIOVASCULAR: No chest pain, palpitations, dizziness, or leg swelling  GASTROINTESTINAL: No abdominal or epigastric pain. No nausea, vomiting, or hematemesis; No diarrhea or constipation. No melena or hematochezia.  GENITOURINARY: No dysuria, frequency, hematuria, or incontinence  NEUROLOGICAL: No headaches, memory loss, loss of strength, numbness, or tremors  SKIN: No itching, burning, rashes, or lesions   LYMPH NODES: No enlarged glands  ENDOCRINE: No heat or cold intolerance; No hair loss  MUSCULOSKELETAL: No joint pain or swelling; No muscle, back, or extremity pain  PSYCHIATRIC: No depression, anxiety, mood swings, or difficulty sleeping  HEME/LYMPH: No easy bruising, or bleeding gums  ALLERY AND IMMUNOLOGIC: No hives or eczema    RADIOLOGY & ADDITIONAL TESTS:    Imaging Personally Reviewed:  [ ] YES  [ ] NO    Consultant(s) Notes Reviewed:  [ ] YES  [ ] NO    PHYSICAL EXAM:  GENERAL: NAD, well-groomed, well-developed  HEAD:  Atraumatic, Normocephalic  EYES: EOMI, PERRLA, conjunctiva and sclera clear  ENMT: No tonsillar erythema, exudates, or enlargement; Moist mucous membranes, Good dentition, No lesions  NECK: Supple, No JVD, Normal thyroid  NERVOUS SYSTEM:  Alert & Oriented X3, Good concentration; Motor Strength 5/5 B/L upper and lower extremities; DTRs 2+ intact and symmetric  CHEST/LUNG: Clear to percussion bilaterally; No rales, rhonchi, wheezing, or rubs  HEART: Regular rate and rhythm; No murmurs, rubs, or gallops  ABDOMEN: Soft, Nontender, Nondistended; Bowel sounds present  EXTREMITIES:  2+ Peripheral Pulses, No clubbing, cyanosis, or edema  LYMPH: No lymphadenopathy noted  SKIN: No rashes or lesions    LABS:                        8.7    12.57 )-----------( 112      ( 18 Jul 2022 13:42 )             26.1     07-18    141  |  112<H>  |  112<H>  ----------------------------<  283<H>  5.0   |  12<L>  |  1.88<H>    Ca    8.7      18 Jul 2022 07:19  Phos  2.2     07-17  Mg     1.90     07-17          CAPILLARY BLOOD GLUCOSE      POCT Blood Glucose.: 274 mg/dL (18 Jul 2022 17:40)  POCT Blood Glucose.: 276 mg/dL (18 Jul 2022 12:32)  POCT Blood Glucose.: 273 mg/dL (18 Jul 2022 09:22)  POCT Blood Glucose.: 241 mg/dL (17 Jul 2022 23:48)            MEDICATIONS  (STANDING):  atorvastatin 80 milliGRAM(s) Oral at bedtime  dextrose 5%. 1000 milliLiter(s) (50 mL/Hr) IV Continuous <Continuous>  dextrose 5%. 1000 milliLiter(s) (100 mL/Hr) IV Continuous <Continuous>  dextrose 50% Injectable 25 Gram(s) IV Push once  dextrose 50% Injectable 12.5 Gram(s) IV Push once  dextrose 50% Injectable 25 Gram(s) IV Push once  glucagon  Injectable 1 milliGRAM(s) IntraMuscular once  insulin glargine Injectable (LANTUS) 4 Unit(s) SubCutaneous once  insulin lispro (ADMELOG) corrective regimen sliding scale   SubCutaneous every 6 hours  pantoprazole Infusion 8 mG/Hr (10 mL/Hr) IV Continuous <Continuous>  potassium phosphate / sodium phosphate Powder (PHOS-NaK) 1 Packet(s) Oral once    MEDICATIONS  (PRN):  dextrose Oral Gel 15 Gram(s) Oral once PRN Blood Glucose LESS THAN 70 milliGRAM(s)/deciliter  ondansetron Injectable 4 milliGRAM(s) IV Push every 8 hours PRN Nausea and/or Vomiting  oxycodone    5 mG/acetaminophen 325 mG 1 Tablet(s) Oral every 8 hours PRN Severe Pain (7 - 10)      Care Discussed with Consultants/Other Providers [ ] YES  [ ] NO

## 2022-07-18 NOTE — CONSULT NOTE ADULT - ASSESSMENT
A/P    76 y.o male with DM, HTN, HLD, PVD s/p left right femoral-femoral bypass with right femoral-popliteal bypass graft, hx of femur fracture s/p surgical interventions who presents for two episodes of coffee ground emesis, found to be hypotensive and Hgb 7.9 from 14 2015, concerning for GI bleed.      #Upper GI bleed  -s/p PRBC's  -trend cbc  -GI f/u  -ppi  -on DAP as outpt, now on hold  -remains optimized from CV standpoint to proceed with acceptable cardiac risk for EGD if planned  -bp stable, hd stable      #MELECIO  -med f/u    #Hypertension.   -hold meds    #Peripheral vascular disease.   -was on aspirin/plavix/pentoxifylline  -now on hold due to bleeding and hypotension.      dvt ppx    70 minutes spent on total encounter; more than 50% of the visit was spent counseling and/or coordinating care by the attending physician.

## 2022-07-18 NOTE — PROGRESS NOTE ADULT - ASSESSMENT
76 y.o male with DM, HTN, HLD, PVD on dual antiplatelet therapy who presents for two episodes of coffee ground emesis, found to be hypotensive and Hgb 7.9 from 14 2015, concerning for GI bleed. Now stable after IVFs and 2pRBCs.      Problem/Plan - 1:  ·  Problem: Upper GI bleed.   ·  Plan: seen by GI , scheduled for EGD in am   NPO after MN  c/w protonix   H/H stable      Problem/Plan - 2:  ·  Problem: Acute kidney injury superimposed on CKD.   ·  Plan: creatinine on admission 2.53, from 2.21 7/2022. Suspect pre-renal from GI bleed vs ATN from hypotension.  trend renal fx      Problem/Plan - 3:  ·  Problem: Hypertension.   ·  Plan: currently normotensive  -holding home amilodipine/valsartan.     Problem/Plan - 4:  ·  Problem: Type 2 diabetes mellitus.   ·  Plan: on home orals: glimepiride, linagliptin, metformin-pioglitazone  -check a1c  -sliding scale with glucose checks.     Problem/Plan - 5:  ·  Problem: Hyperlipidemia.   ·  Plan: -c/w home statin or equivalent.     Problem/Plan - 6:  ·  Problem: Peripheral vascular disease.   ·  Plan: -on aspirin/plavix and pentoxifylline  -holding due to bleeding and hypotension.     Problem/Plan - 7:  ·  Problem: Pain, chronic.   ·  Plan: chronic leg pain from PVD, and prior fractures  -c/w home percocet PRN.

## 2022-07-18 NOTE — CHART NOTE - NSCHARTNOTEFT_GEN_A_CORE
Notified by RN earlier in the shift, pt c/o abdominal pain, s/p IV Tylenol, already on Oxycodone prn. Patient seen and examined at bedside, resting comfortable, NAD distress noted, states still having abdominal pain on /off about 6/10, pointing towards lower abdomen below umbilicus/ suprapubic. Denies CP, no SOB, no DIAZ, no nausea/ vomiting at this time, had about 3 episodes of BM since admission last BM was earlier in the day, denies any hematochezia/ no melena, but reports coffee like vomit prior to admission, reports passing gas. On exam: lungs - CTA, no wheezing; Cardio -> + S1S2, tachycardic, RRR; Abd --> soft, + mild tenderness periumbilical, mildly distended, + BS;  Ext -> no edema noted    Vital Signs Last 24 Hrs  T(F): 97.7 (18 Jul 2022 16:31), Max: 99 (18 Jul 2022 03:25)  HR: 102 (18 Jul 2022 16:31) (90 - 111)  BP: 102/50 (18 Jul 2022 16:31) (83/61 - 136/68)  RR: 18 (18 Jul 2022 16:31) (16 - 18)  SpO2: 100% (18 Jul 2022 16:31) (99% - 100%)                          8.7    12.57 )-----------( 112      ( 18 Jul 2022 13:42 )             26.1     07-18    141  |  112<H>  |  112<H>  ----------------------------<  283<H>  5.0   |  12<L>  |  1.88<H>    Ca    8.7      18 Jul 2022 07:19  Phos  2.2     07-17  Mg     1.90     07-17      76 y.o male with DM, HTN, HLD, PVD on dual antiplatelet therapy who presents for two episodes of coffee ground emesis, found to be hypotensive and Hgb 7.9 from 14 2015, concerning for UGI bleed.     - a/w coffee ground emesis in the setting of dual antiplatelet therapy c/b hypotension  - Hgb 7.9 --> 6.6 on admission  - As of 7/18: s/p 4U pRBC, 1U platelets, & 1U FFP  - c/w Protonix gtt/ IVF  - Transfuse PRN for Hgb < 7.5      Abdominal pain this evening  - Abd xray - ordered  - s/p IV tylenol  - already on Oxycodone prn  - CBC post trx  - on clears for now, GI following -> NPO after MDN for poss EGD in am Notified by RN earlier in the shift, pt c/o abdominal pain, s/p IV Tylenol, already on Oxycodone prn. Patient seen and examined at bedside, resting comfortable, NAD distress noted, states still having abdominal pain on /off about 6/10, pointing towards lower abdomen below umbilicus/ suprapubic. Denies CP, no SOB, no DIAZ, no nausea/ vomiting at this time, had about 3 episodes of BM since admission last BM was earlier in the day, denies any hematochezia/ no melena, but reports coffee like vomit prior to admission, reports passing gas. On exam: lungs - CTA, no wheezing; Cardio -> + S1S2, tachycardic, RRR; Abd --> soft, + mild tenderness periumbilical, mildly distended, + BS;  Ext -> no edema noted    Vital Signs Last 24 Hrs  T(F): 97.7 (18 Jul 2022 16:31), Max: 99 (18 Jul 2022 03:25)  HR: 102 (18 Jul 2022 16:31) (90 - 111)  BP: 102/50 (18 Jul 2022 16:31) (83/61 - 136/68)  RR: 18 (18 Jul 2022 16:31) (16 - 18)  SpO2: 100% (18 Jul 2022 16:31) (99% - 100%)                          8.7    12.57 )-----------( 112      ( 18 Jul 2022 13:42 )             26.1     07-18    141  |  112<H>  |  112<H>  ----------------------------<  283<H>  5.0   |  12<L>  |  1.88<H>    Ca    8.7      18 Jul 2022 07:19  Phos  2.2     07-17  Mg     1.90     07-17      76 y.o male with DM, HTN, HLD, PVD on dual antiplatelet therapy who presents for two episodes of coffee ground emesis, found to be hypotensive and Hgb 7.9 from 14 2015, concerning for UGI bleed.     - a/w coffee ground emesis in the setting of dual antiplatelet therapy c/b hypotension  - Hgb 7.9 --> 6.6 on admission  - As of 7/18: s/p 4U pRBC, 1U platelets, & 1U FFP  - c/w Protonix gtt/ IVF  - Transfuse PRN for Hgb < 7.5      Abdominal pain this evening  - Abd xray - ordered  - bladder scan  - Occult - -P   - s/p IV tylenol  - already on Oxycodone prn  - CBC post trx  - on clears for now, GI following -> NPO after MDN for poss EGD in am Notified by RN earlier in the shift, pt c/o abdominal pain, s/p IV Tylenol, already on Oxycodone prn. Patient seen and examined at bedside, resting comfortable, NAD distress noted, states still having abdominal pain on /off about 6/10, pointing towards lower abdomen below umbilicus/ suprapubic. Denies CP, no SOB, no DIAZ, no nausea/ vomiting at this time, had about 3 episodes of BM since admission last BM was earlier in the day, denies any hematochezia/ no melena, but reports coffee like vomit prior to admission, reports passing gas. On exam: lungs - CTA, no wheezing; Cardio -> + S1S2, tachycardic, RRR; Abd --> soft, + mild tenderness periumbilical, mildly distended, + BS;  Ext -> no edema noted    Vital Signs Last 24 Hrs  T(F): 97.7 (18 Jul 2022 16:31), Max: 99 (18 Jul 2022 03:25)  HR: 102 (18 Jul 2022 16:31) (90 - 111)  BP: 102/50 (18 Jul 2022 16:31) (83/61 - 136/68)  RR: 18 (18 Jul 2022 16:31) (16 - 18)  SpO2: 100% (18 Jul 2022 16:31) (99% - 100%)                          8.7    12.57 )-----------( 112      ( 18 Jul 2022 13:42 )             26.1     07-18    141  |  112<H>  |  112<H>  ----------------------------<  283<H>  5.0   |  12<L>  |  1.88<H>    Ca    8.7      18 Jul 2022 07:19  Phos  2.2     07-17  Mg     1.90     07-17      76 y.o male with DM, HTN, HLD, PVD on dual antiplatelet therapy who presents for two episodes of coffee ground emesis, found to be hypotensive and Hgb 7.9 from 14 2015, concerning for UGI bleed.     - a/w coffee ground emesis in the setting of dual antiplatelet therapy c/b hypotension  - Hgb 7.9 --> 6.6 on admission  - As of 7/18: s/p 4U pRBC, 1U platelets, & 1U FFP  - c/w Protonix gtt/ IVF  - Transfuse PRN for Hgb < 7.5      Abdominal pain this evening  - Abd xray - ordered  - bladder scan  - Occult - -P   - s/p IV tylenol, gentle IVF x 12 hrs  - already on Oxycodone prn  - CBC post trx  - on clears for now, GI following -> NPO after MDN for poss EGD in am  - dr Yanez notified of above, will c/t monitor Notified by RN earlier in the shift, pt c/o abdominal pain, s/p IV Tylenol, already on Oxycodone prn. Patient seen and examined at bedside, resting comfortable, NAD distress noted, states still having abdominal pain on /off about 6/10, pointing towards lower abdomen below umbilicus/ suprapubic. Denies CP, no SOB, no DIAZ, no nausea/ vomiting at this time, had about 3 episodes of BM since admission last BM was earlier in the day, denies any hematochezia/ no melena, but reports coffee like vomit prior to admission, reports passing gas. On exam: lungs - CTA, no wheezing; Cardio -> + S1S2, tachycardic, RRR; Abd --> soft, + mild tenderness periumbilical, mildly distended, + BS;  Ext -> no edema noted    Vital Signs Last 24 Hrs  T(F): 97.7 (18 Jul 2022 16:31), Max: 99 (18 Jul 2022 03:25)  HR: 102 (18 Jul 2022 16:31) (90 - 111)  BP: 102/50 (18 Jul 2022 16:31) (83/61 - 136/68)  RR: 18 (18 Jul 2022 16:31) (16 - 18)  SpO2: 100% (18 Jul 2022 16:31) (99% - 100%)                          8.7    12.57 )-----------( 112      ( 18 Jul 2022 13:42 )             26.1     07-18    141  |  112<H>  |  112<H>  ----------------------------<  283<H>  5.0   |  12<L>  |  1.88<H>    Ca    8.7      18 Jul 2022 07:19  Phos  2.2     07-17  Mg     1.90     07-17      76 y.o male with DM, HTN, HLD, PVD on dual antiplatelet therapy who presents for two episodes of coffee ground emesis, found to be hypotensive and Hgb 7.9 from 14 2015, concerning for UGI bleed.     - a/w coffee ground emesis in the setting of dual antiplatelet therapy c/b hypotension  - Hgb 7.9 --> 6.6 on admission  - As of 7/18: s/p 4U pRBC, 1U platelets, & 1U FFP  - c/w Protonix gtt/ IVF  - Transfuse PRN for Hgb < 7.5      Abdominal pain this evening  - Abd xray - ordered  - bladder scan  - Occult - -P   - s/p IV tylenol, gentle IVF x 12 hrs  - already on Oxycodone prn  - CBC post trx  - on clears for now, GI following -> NPO after MDN for poss EGD in am  - dr Yanez notified of above, will c/t monitor      Addendum: ~ 5am    Repeat post transfusion CBC resulted in drop of Hg, 8.7--> 5.6, pt still with c/o abdominal pain, noted to be Hypotensive down to 86/59 and tachycardic up to 110bpm.  NS bolus 500cc IVF x 1 ordered, MICU called for eval, repeat CBC, coags, lactate ordered, Surgery consulted given multiple air filled loops of bowel on prelim on Abd xray,  GI notified as well, Hg repeated -> Hg 5.3, additional 2UPRBC ordered, Bleeding scan ordered, CT a/p  without contrast given elevated creatinine, pt to be transferred to MICU.

## 2022-07-18 NOTE — CHART NOTE - NSCHARTNOTEFT_GEN_A_CORE
Brief GI Update    Patient had been planned for EGD today, but had not been optimized earlier this AM due to hypotension with additional resuscitation pending and unfortunately was still at TTE this afternoon at time of transfer to endoscopy suite. As a result, endoscopy staff no longer available for procedure as per discussions with anesthesia/RN team. Patient currently HD stable without further overt bleeding and most recent Hgb 8.7.     --Continue PPI 40mg BID or PPI drip for now  --OK for clear liquid diet tonight, NPO at midnight  --Plan for EGD tomorrow. If patient with recurrent overt bleeding, HD instability or any further change in clinical status, please do not hesitate to reach out to GI as more urgent endoscopic evaluation may be required.   --Appreciate cardiology input re: TTE findings to avoid any potential delays tomorrow  --Ensure early AM labs    Patient and primary team updated by GI team this afternoon.     Joselyn Vela MD  Attending, Division of Gastroenterology    --    HOW TO REACH THE GI FELLOW:  FOR FOLLOW UP QUESTIONS: Weekday (M-F; 7am-5pm)  1) message on [employer] Teams   2) Page: [phone] ([employer] Short Range Pager); [phone] (Long Range Pager)    FOR FOLLOW UP QUESTIONS: Weeknights   For non-emergent questions that can wait until the morning, please email ([email or web address]; [email or web address])  For emergent questions that CANNOT wait until the morning, please   1) page GI fellow via hospital /[employer]   OR 2) Call GI fellow on call via [employer] Teams   (do not message on microscoft teams overnight)    FOR FOLLOW UP QUESTIONS: Weekends  For non-emergent questions please email ([email or web address]; [email or web address])  For emergent questions that CANNOT wait, please   1) page GI fellow via hospital /[employer]   OR 2) Call GI fellow on call via [employer] Teams     FOR NEW CONSULTS:  Non-emergent consults: please email ([email or web address]; [email or web address])  For emergent consults:  please   1) page GI fellow via hospital    OR 2) Call GI fellow on call via [employer] Teams Brief GI Update    Patient had been planned for EGD today, but had not been optimized earlier this AM due to hypotension with additional resuscitation pending and was still at TTE/pending TTE results this afternoon. As a result, endoscopy staff no longer available for procedure in endo suite as per discussions with anesthesia/RN team. Patient currently HD stable without further overt bleeding and most recent Hgb 8.7.     --Continue PPI 40mg BID or PPI drip for now  --OK for clear liquid diet tonight, NPO at midnight  --Plan for EGD tomorrow. If patient with recurrent overt bleeding, HD instability or any further change in clinical status, please do not hesitate to reach out to GI as more urgent endoscopic evaluation in ICU/OR may be required.   --Appreciate cardiology input re: TTE findings to avoid any potential delays tomorrow  --Ensure early AM labs    Patient and primary team updated by GI team this afternoon.     Joselyn Vela MD  Attending, Division of Gastroenterology    --    HOW TO REACH THE GI FELLOW:  FOR FOLLOW UP QUESTIONS: Weekday (M-F; 7am-5pm)  1) message on [employer] Teams   2) Page: [phone] ([employer] Short Range Pager); [phone] (Long Range Pager)    FOR FOLLOW UP QUESTIONS: Weeknights   For non-emergent questions that can wait until the morning, please email ([email or web address]; [email or web address])  For emergent questions that CANNOT wait until the morning, please   1) page GI fellow via hospital /[employer]   OR 2) Call GI fellow on call via [employer] Teams   (do not message on microscoft teams overnight)    FOR FOLLOW UP QUESTIONS: Weekends  For non-emergent questions please email ([email or web address]; [email or web address])  For emergent questions that CANNOT wait, please   1) page GI fellow via hospital /[employer]   OR 2) Call GI fellow on call via [employer] Teams     FOR NEW CONSULTS:  Non-emergent consults: please email ([email or web address]; [email or web address])  For emergent consults:  please   1) page GI fellow via hospital    OR 2) Call GI fellow on call via [employer] Teams Brief GI Update    Patient had been planned for EGD today, but had not been optimized earlier this AM due to hypotension with additional resuscitation pending and was still at TTE/pending TTE results this afternoon. Patient currently HD stable without further overt bleeding and most recent Hgb 8.7. As TTE results not yet available and given improved hemodynamics, endoscopy postponed.     --Continue PPI 40mg BID or PPI drip for now  --OK for clear liquid diet tonight, NPO at midnight  --Plan for EGD tomorrow. If patient with recurrent overt bleeding, HD instability or any further change in clinical status, please do not hesitate to reach out to GI as more urgent endoscopic evaluation in ICU/OR may be required.   --Appreciate cardiology input re: TTE findings to avoid any potential delays tomorrow  --Ensure early AM labs    Patient and primary team updated by GI team this afternoon.     Joselyn Vela MD  Attending, Division of Gastroenterology    Above note addended - initially had been informed that endoscopy postponed by anesthesia as patient was in TTE and not available for transport, but given stabilized hemodynamics and improved Hgb to 8.7, they requested to defer while awaiting TTE results.     --    HOW TO REACH THE GI FELLOW:  FOR FOLLOW UP QUESTIONS: Weekday (M-F; 7am-5pm)  1) message on [employer] Teams   2) Page: [phone] ([address] Short Range Pager); [phone] (Long Range Pager)    FOR FOLLOW UP QUESTIONS: Weeknights   For non-emergent questions that can wait until the morning, please email ([email or web address]; [email or web address])  For emergent questions that CANNOT wait until the morning, please   1) page GI fellow via hospital /[employer]   OR 2) Call GI fellow on call via [employer] Teams   (do not message on microscoft teams overnight)    FOR FOLLOW UP QUESTIONS: Weekends  For non-emergent questions please email ([email or web address]; [email or web address])  For emergent questions that CANNOT wait, please   1) page GI fellow via hospital /[employer]   OR 2) Call GI fellow on call via [employer] Teams     FOR NEW CONSULTS:  Non-emergent consults: please email ([email or web address]; [email or web address])  For emergent consults:  please   1) page GI fellow via hospital    OR 2) Call GI fellow on call via [employer] Teams

## 2022-07-18 NOTE — PATIENT PROFILE ADULT - FUNCTIONAL ASSESSMENT - DAILY ACTIVITY 1.
Patient : Natanael Wallace Age: 34 year old Sex: male   MRN: [id number] Encounter Date: 4/14/2018      History     Chief Complaint   Patient presents with   • Shoulder Pain   right shoulder pain.     HPI  Natanael Wallace is a 34 year old male presenting with right  shoulder pain. He is right-hand dominant and has had left rotator cuff repaired in the past. He cannot recall any trauma or precipitating event.    3 months ago began to develop right shoulder pain he feels like it's \"not like a muscle\", but deep \"inside\" \"like cartilage or something\". He now has constant level discomfort with some stabbing sharp pain. Pain is worse with movement or lifting and became worse today after he shoveled snow. No new numbness.     Saw primary M.D. last week who did refer him to him to PT and short course of analgesics. NO imaging.However, he did not pick them up at the pharmacy. Now with increased pain  He is unable to  as a pharmacy is closed. The pain is an 8 out of 10. With continued discomfort he decided to seek evaluation.   +  Allergies   Allergen Reactions   • Cephalosporins Other (See Comments)     Call received from [facility], cephalosporin allergy listed.  1/29/18:  Not verified, pt is unaware of this allergy will check with PMD.   • Penicillins RASH       Discharge Medication List as of 4/14/2018  3:37 PM      CONTINUE these medications which have NOT CHANGED    Details   albuterol 108 (90 Base) MCG/ACT inhaler Inhale 2 puffs into the lungs every 4 hours as needed for Wheezing.Eprescribe, Disp-8.5 g, R-0      !! fluticasone (FLONASE) 50 MCG/ACT nasal spray Spray 1 spray in each nostril daily.Eprescribe, Disp-16 g, R-0      CLONAZEPAM PO Historical Med      UNKNOWN TO PATIENT Blood Pressure PillHistorical Med      HYDROCHLOROTHIAZIDE PO Historical Med      !! Fluticasone Propionate (FLONASE NA) Historical Med      Montelukast Sodium (SINGULAIR PO) Historical Med      IBUPROFEN PO Take  by mouth.Historical  Med      Amphetamine-Dextroamphetamine (ADDERALL PO) Take  by mouth.Historical Med      Escitalopram Oxalate (LEXAPRO PO) Take  by mouth.Historical Med       !! - Potential duplicate medications found. Please discuss with provider.          Discharge Medication List as of 4/14/2018  3:37 PM      START taking these medications    Details   HYDROcodone-acetaminophen (NORCO) 5-325 MG per tablet Delegates - In compliance with [place] law, the Prescription Drug Monitoring Program must be reviewed prior to signing any order for a controlled substance. Prescription is ExemptExemption Reason: The prescription is for 3 days or less and is not subj ect to refill1-2 PO Q 6 Hours PRN PainEprescribe, Disp-15 tablet, R-0             Past Medical History:   Diagnosis Date   • Anxiety    • Attention deficit disorder    • Depression    • Essential (primary) hypertension    • Seasonal allergies        No past surgical history on file.    No family history on file.    Social History   Substance Use Topics   • Smoking status: Current Every Day Smoker     Packs/day: 0.25     Types: Cigarettes   • Smokeless tobacco: Never Used      Comment: E cigs, vapes   • Alcohol use No   Here with SO  Used translation device as patient and SO deaf.     Review of Systems  Constitutional, skin , MSK and neuro pleased see HPI for details.      Physical Exam     ED Triage Vitals [04/14/18 1450]   ED Triage Vitals Group      Temp 98.2 °F (36.8 °C)      Pulse 82      Resp 18      BP (!) 138/93      SpO2 99 %      EtCO2 mmHg       Height       Weight 211 lb 6.7 oz (95.9 kg)      Weight Scale Used ED Stated       Physical Exam   Constitutional: He appears well-developed.   HENT:   Head: Normocephalic.   Right Ear: External ear normal.   Left Ear: External ear normal.   Nose: Nose normal.   Eyes: Pupils are equal, round, and reactive to light.   Neck: Neck supple.   Cardiovascular: Normal rate and regular rhythm.    radial pulse 2+ on right    Musculoskeletal:        Right shoulder: He exhibits decreased range of motion and decreased strength. He exhibits no bony tenderness, no swelling, no effusion, no crepitus, no deformity, no laceration, no pain, no spasm and normal pulse.   Unable to actively abduct to 90 degrees. Can maintain if passively placed, but not against resistance. Nontender distal to the shoulder. Sensation intact.  biceps triceps are 5 out of 5 pain with a biceps testing    Neurological: He is alert.   Skin: Skin is warm and dry. No rash noted. No erythema.   Psychiatric: He has a normal mood and affect.   Nursing note and vitals reviewed.      ED Course     Procedures    Lab Results     No results found for this visit on 04/14/18.        Radiology Results     Imaging Results          XR Shoulder 3 View Right (Final result)  Result time 04/14/18 15:24:47    Final result                 Impression:    FINDINGS AND IMPRESSION:  The alignment of the right femoral joint is anatomic. The bone mineral  density is normal. There is no acute fracture or subluxation. The right  acromion clavicular joint appears normal. The visualized right clavicle and  coracoid process appears normal. The coracoclavicular distance is  well-maintained. The surrounding soft tissues are within normal limits. Old  healed fractures of the the right fourth and fifth ribs are noted in the  posterolateral region.               Narrative:    EXAM:  XR SHOULDER 3 VW RIGHT    TECHNIQUE: 3 views of the right frontal joint were obtained.    HISTORY:  sprain or strain    COMPARISONS: None.                                ED Medication Orders     None      prelim read by myself: no fracture or dislocation.      MDM  Discussed with prelim xray read,  clinical impression (suspect rotator cuff pathology ie tear, tendonitis etc) and treatment plan. Discussed symptomatic care and any scripts. Discussed patient use of narcotics and side effect profile. Aware side effects can  include sedation, nausea, constipation (may require treatment) and addiction potential. Cautioned on use particularly against use with alcohol, driving or machinery use. Intended for short term use. Will provide short script vicodin (PDMP reviewed --no meds listed) and use NSAIDS. Discussed signs or sx that might prompt need for earlier recheck. Voiced understanding.     Clinical Impression     ED Diagnosis   1. Disorder of right rotator cuff         Disposition        Discharge 4/14/2018  3:36 PM  Natanael Wallace discharge to home/self care.           ED Course/MDM:    Diagnosis  The encounter diagnosis was Disorder of right rotator cuff.    Follow Up:  Mehdi Stone MD  [address]  [place] [id number] [phone]      Continue ibuprofen (200 mg) 3 tabs taken 3x/day with food as needed for pain. NO tylenol with the vicodin. All further scripts through your primary MD. Schedule follow-up with ortho nonemergently for further evaluation of your rotator cuff.          Discharge Medication List as of 4/14/2018  3:37 PM      START taking these medications    Details   HYDROcodone-acetaminophen (NORCO) 5-325 MG per tablet Delegates - In compliance with [place] law, the Prescription Drug Monitoring Program must be reviewed prior to signing any order for a controlled substance. Prescription is ExemptExemption Reason: The prescription is for 3 days or less and is not subj ect to refill1-2 PO Q 6 Hours PRN PainEprescribe, Disp-15 tablet, R-0             Pt is discharged to home/self care in good condition.     Closure:  The patient understands that this is a provisional diagnosis. Provisional diagnosis can and do change. The diagnosis that he is discharged with today is based on the symptoms with which he presented today. If any new symptoms occur or worsen, he should seek immediate attention for re-evaluation           Lisa Allen MD  04/15/18 8747     2 = A lot of assistance

## 2022-07-18 NOTE — PATIENT PROFILE ADULT - VISION (WITH CORRECTIVE LENSES IF THE PATIENT USUALLY WEARS THEM):
Use glasses/Partially impaired: cannot see medication labels or newsprint, but can see obstacles in path, and the surrounding layout; can count fingers at arm's length Use glasses/Normal vision: sees adequately in most situations; can see medication labels, newsprint

## 2022-07-18 NOTE — PATIENT PROFILE ADULT - FALL HARM RISK - RISK INTERVENTIONS

## 2022-07-19 NOTE — CHART NOTE - NSCHARTNOTEFT_GEN_A_CORE
: Slick Canales    INDICATION:  Shock    PROCEDURE:  [ x] LIMITED ECHO  [ x] LIMITED CHEST  [ x] LIMITED RETROPERITONEAL  [ x] LIMITED ABDOMINAL  [ ] LIMITED DVT  [ ] NEEDLE GUIDANCE VASCULAR  [ ] NEEDLE GUIDANCE THORACENTESIS  [ ] NEEDLE GUIDANCE PARACENTESIS  [ ] NEEDLE GUIDANCE PERICARDIOCENTESIS  [ ] OTHER    FINDINGS:  Lungs: A line predominance. Lung sliding b/l. No pleffs.   Heart: Grossly normal LVSF. LV > RV. Hypertrophied interventricular septum with systolic anterior motion of the mitral valve. No pericardial effusion. IVC 0.6 cm with respiratory variation.   Abd/RP:  No free fluid in the hepatorenal or splenorenal spaces. No free fluid noted w/ exam of the RLQ, LLQ, and suprapubic area.    INTERPRETATION:  Normal aeration pattern.  Distributive shock. Likely component of hypovolemia w/ LVOT obstructive physiology.    Images uploaded on [employer]

## 2022-07-19 NOTE — PROVIDER CONTACT NOTE (CRITICAL VALUE NOTIFICATION) - BACKGROUND
75YO M on dual antiplatelet therapy who reports with 2 episodes of coffee ground emesis, found hypotensive with HG of 7.9 from 12, s/p 4 units PRBC, 1 Unit plasma and 1 Unit platelets.
77YO M on dual antiplatelet therapy who reports with 2 episodes of coffee ground emesis, found hypotensive with HG of 7.9 from 12, s/p 4 units PRBC, 1 Unit plasma and 1 Unit platelets.

## 2022-07-19 NOTE — DIETITIAN INITIAL EVALUATION ADULT - PERTINENT MEDS FT
MEDICATIONS  (STANDING):  atorvastatin 80 milliGRAM(s) Oral at bedtime  calcium gluconate IVPB 1 Gram(s) IV Intermittent once  dextrose 5%. 1000 milliLiter(s) (50 mL/Hr) IV Continuous <Continuous>  dextrose 5%. 1000 milliLiter(s) (100 mL/Hr) IV Continuous <Continuous>  dextrose 50% Injectable 25 Gram(s) IV Push once  dextrose 50% Injectable 12.5 Gram(s) IV Push once  dextrose 50% Injectable 25 Gram(s) IV Push once  glucagon  Injectable 1 milliGRAM(s) IntraMuscular once  insulin glargine Injectable (LANTUS) 4 Unit(s) SubCutaneous <User Schedule>  insulin lispro (ADMELOG) corrective regimen sliding scale   SubCutaneous every 6 hours  lactated ringers Bolus 1000 milliLiter(s) IV Bolus once  metoclopramide Injectable 10 milliGRAM(s) IV Push once  pantoprazole Infusion 8 mG/Hr (10 mL/Hr) IV Continuous <Continuous>  phenylephrine    Infusion 0.5 MICROgram(s)/kG/Min (6.3 mL/Hr) IV Continuous <Continuous>  potassium phosphate / sodium phosphate Powder (PHOS-NaK) 1 Packet(s) Oral once  sodium bicarbonate  Infusion 0.223 mEq/kG/Hr (100 mL/Hr) IV Continuous <Continuous>  vasopressin Infusion 0.04 Unit(s)/Min (2.4 mL/Hr) IV Continuous <Continuous>    MEDICATIONS  (PRN):  dextrose Oral Gel 15 Gram(s) Oral once PRN Blood Glucose LESS THAN 70 milliGRAM(s)/deciliter  ondansetron Injectable 4 milliGRAM(s) IV Push every 8 hours PRN Nausea and/or Vomiting

## 2022-07-19 NOTE — CONSULT NOTE ADULT - ATTENDING COMMENTS
pt is a 75 yo male with hx htn, hld, dm, fem-fem bypass ,   presents with hx hematemesis x 2 episodes, dark brown to   red less than a cup bloody vomitus. Pt alert and awake denies,  sob, chest pain, abdominal pain, headaches, blurry vision, no  melena, PE bp 117/65 rr20 pulse 110 o2 sat 96 room air    neck supple, lungs clear, no rales or wheezing,  heart s1s2 abdomen rlq incision, ext no edema,    labs wbc 4.6 hgb 6.6 hct 18  plts 192   bicarb 17 cr 2.53    ekg RBBB    A/P 75 yo male with episode of ugi vomitus, alert and  awake, bp stable, hgb 6.6 for prbcs x2 units  -serial cbc post transfusion  -protonix drip   -gi evaluation for endoscopy evaluation  -check echo to evaluate lv function  -dvt prophylaxis with venodynes  -monitor urine output  -pt does not require icu currently as hemodynamically stable and  no hematemesis noted,  please reconsult if condition changes.
75 yo M pmh DM, HTN/HL, PVD s/p b/l fem-pop bypass and planned repeat angioplasty admitted with coffee ground emesis x 2.  He is unclear of all of his meds, but reports not taking nsaids.  Unclear if on a/c or ASA for vascular disease.  There is drop in hgb on admit now s/p 1 unit prbc (no repeat yet).  Tachycardic on exam, but BP stable and benign abdominal exam.  No known liver disease.  Denies melena at this time.  Ddx includes PUD and warrants endoscopic evaluation (non emergent)    - IV PPI Drip  - EGD in AM unless does not respond to transfusions or clinical status worsens  - Clear liquid diet  - Trend CBC and prbc xfusion as needed  - Would get home medication list
pt is a 77 yo male with hx htn, hld, dm, fem fem bypass, presented  with coffee ground emesis, had hgb 6.6, responded to  2 units prbcs  over 24 hrs , to hgb 7.8, pt had a drop in hgb overnight to hgb 5.6  episode of hypotension. Pt s/p ivf bolus, on protonix, was scheduled  for endoscopy by gi ,  Pt alert and awake complains of mild epigastric  pain.   bp 90/50  rr 22 o2 sat 96 heent no jvd  lungs few crackles b/l heart 110 +angel  abdomen mild epigastric  tenderness.   ext no edema    labs hgb 5.3  bicarb 11   cr 3.5    A/p 77 yo male with ugi bleed, with decrease in hgb  -transfuse 2 units prbcs, serial cbc /pklts  -gi follow up regarding endoscopy, continue protonix drip  -bleeding scan to evaluate location of bleed, pt with elevated  creatinine , no ct angio  -surgical evaluation appreciated, will monitor  -check echo to evaluate lv function  -monitor urine output  -critically ill with shock and gi bleed

## 2022-07-19 NOTE — PROGRESS NOTE ADULT - ASSESSMENT
76 y.o male with DM, HTN, HLD, PVD on dual antiplatelet therapy who presents for two episodes of coffee ground emesis, found to be hypotensive and Hgb 7.9 from 14 2015, concerning for GI bleed. Now stable after IVFs and 2pRBCs.      Problem/Plan - 1:  ·  Problem: Upper GI bleed. / hypotension   ·  Plan: transfer to MICU   GI following   IV fluids   EGD     Problem/Plan - 2:  ·  Problem: Acute kidney injury superimposed on CKD.   ·  Plan: creatinine on admission 2.53, from 2.21 7/2022. Suspect pre-renal from GI bleed vs ATN from hypotension.  trend renal fx      Problem/Plan - 3:  ·  Problem: Hypertension.   ·  Plan: currently normotensive  -holding home amilodipine/valsartan.     Problem/Plan - 4:  ·  Problem: Type 2 diabetes mellitus.   ·  Plan: on home orals: glimepiride, linagliptin, metformin-pioglitazone  -check a1c  -sliding scale with glucose checks.     Problem/Plan - 5:  ·  Problem: Hyperlipidemia.   ·  Plan: -c/w home statin or equivalent.     Problem/Plan - 6:  ·  Problem: Peripheral vascular disease.   ·  Plan: -on aspirin/plavix and pentoxifylline  -holding due to bleeding and hypotension.     Problem/Plan - 7:  ·  Problem: Pain, chronic.   ·  Plan: chronic leg pain from PVD, and prior fractures  -c/w home percocet PRN.

## 2022-07-19 NOTE — PRE PROCEDURE NOTE - ATTENDING COMMENTS
Case discussed with DR Dane WOLF. Bleeding localized to duodenum in GDA distribution. In setting of active bleeding and hemodynamic instability, will proceed with angiogram emergently.

## 2022-07-19 NOTE — CONSULT NOTE ADULT - SUBJECTIVE AND OBJECTIVE BOX
CARDIOLOGY CONSULT NOTE - DR. GARCIA    HPI:  76 y.o male with DM, HTN, HLD, PVD s/p left right femoral-femoral bypass with right femoral-popliteal bypass graft, hx of femur fracture s/p surgical interventions who presents for two episodes of coffee ground emesis.     Patient report Friday night 7/15 he had a episode of vomiting black content. Saturday morning 7/16 when he was getting ready to come to the hospital, had another episode of dark emesis, about 2 big cups of dark content. He report this has never happen before. Denies any recent naproxen/ibuprofen or other NSAID use besides his daily baby aspirin. But endorses PCP prescribed a new medicine about 1 week ago and he was told to take everyday as the aspirin. Report no abdominal pain. He attempted to have something to eat Saturday morning however could not keep anything down. He endorses a prior EGD/colonoscopy but thinks this was on 2015, and was negative. Has been having regular bowel movements. He denies any dyspepsia, dark stools, hematochezia, shortness of breath, chest pain, dizziness, alcohol use, nausea.     Reviewed dispense report, patient got dispensed plavix 75mg on July 8/2020.  (17 Jul 2022 08:58)      PAST MEDICAL & SURGICAL HISTORY:  Hypertension      Diabetes mellitus      Glaucoma      Fracture of femur  Had surgery (1997 )      Hyperlipidemia      S/P appendectomy      S/P femoral-popliteal bypass surgery      History of inferior vena caval filter placement  [place] Filter      Femoral fracture  Right &amp; had surgery ( 1997 )            PREVIOUS DIAGNOSTIC TESTING:    [ ] Echocardiogram:  [ ]  Catheterization:  [ ] Stress Test:  	    MEDICATIONS:    Home Medications:  amlodipine-valsartan 5 mg-320 mg oral tablet: 1 tab(s) orally once a day (17 Jul 2022 09:56)  clopidogrel 75 mg oral tablet: 1 tab(s) orally once a day (17 Jul 2022 09:56)  Combigan 0.2%-0.5% ophthalmic solution: 1 drop(s) to each affected eye every 12 hours (17 Jul 2022 09:56)  dorzolamide 2% ophthalmic solution: 1 drop(s) to each affected eye 2 times a day (17 Jul 2022 09:56)  glimepiride 1 mg oral tablet: 1 tab(s) orally once a day (17 Jul 2022 09:56)  linagliptin 5 mg oral tablet: 1 tab(s) orally once a day (17 Jul 2022 09:56)  Lumigan 0.01% ophthalmic solution: 1 drop(s) to each affected eye once a day (17 Jul 2022 09:56)  Metanx oral capsule: 1 cap(s) orally 2 times a day (17 Jul 2022 09:56)  metFORMIN-pioglitazone 850 mg-15 mg oral tablet: 1 tab(s) orally 2 times a day (17 Jul 2022 09:56)  Nasonex 50 mcg/inh nasal spray: 2 spray(s) nasal once a day (17 Jul 2022 09:56)  Onglyza 5 mg oral tablet: 1 tab(s) orally once a day (17 Jul 2022 09:56)  pentoxifylline 400 mg oral tablet, extended release: 1 tab(s) orally 3 times a day (17 Jul 2022 09:56)  pioglitazone-metformin 15 mg-850 mg oral tablet: 1 tab(s) orally 2 times a day (17 Jul 2022 09:56)  rosuvastatin 20 mg oral tablet: 1 tab(s) orally once a day (17 Jul 2022 09:56)      MEDICATIONS  (STANDING):  atorvastatin 80 milliGRAM(s) Oral at bedtime  dextrose 5%. 1000 milliLiter(s) (100 mL/Hr) IV Continuous <Continuous>  dextrose 5%. 1000 milliLiter(s) (50 mL/Hr) IV Continuous <Continuous>  dextrose 50% Injectable 25 Gram(s) IV Push once  dextrose 50% Injectable 12.5 Gram(s) IV Push once  dextrose 50% Injectable 25 Gram(s) IV Push once  glucagon  Injectable 1 milliGRAM(s) IntraMuscular once  insulin lispro (ADMELOG) corrective regimen sliding scale   SubCutaneous three times a day before meals  pantoprazole Infusion 8 mG/Hr (10 mL/Hr) IV Continuous <Continuous>  potassium phosphate / sodium phosphate Powder (PHOS-NaK) 1 Packet(s) Oral once      FAMILY HISTORY:    nc  SOCIAL HISTORY:    [x ] Non-smoker  [ ] Smoker  [ ] Alcohol    Allergies    No Known Allergies    Intolerances    	    REVIEW OF SYSTEMS:  CONSTITUTIONAL: No fever, weight loss, or fatigue  EYES: No eye pain, visual disturbances, or discharge  ENMT:  No difficulty hearing, tinnitus, vertigo; No sinus or throat pain  NECK: No pain or stiffness  RESPIRATORY: No cough, wheezing, chills or hemoptysis; No Shortness of Breath  CARDIOVASCULAR: as HPI  GASTROINTESTINAL: No abdominal or epigastric pain. ++nausea, vomiting,++ hematemesis; No diarrhea or constipation. No melena or hematochezia.  GENITOURINARY: No dysuria, frequency, hematuria, or incontinence  NEUROLOGICAL: No headaches, memory loss, loss of strength, numbness, or tremors  SKIN: No itching, burning, rashes, or lesions   	  [ ] All others negative	  [ ] Unable to obtain    PHYSICAL EXAM:    T(C): 36.7 (07-18-22 @ 11:15), Max: 37.2 (07-17-22 @ 15:25)  HR: 92 (07-18-22 @ 11:15) (92 - 125)  BP: 136/68 (07-18-22 @ 11:15) (83/61 - 136/68)  RR: 18 (07-18-22 @ 11:15) (15 - 20)  SpO2: 99% (07-18-22 @ 11:15) (98% - 100%)  Wt(kg): --  I&O's Summary    Daily     Daily     Appearance: Normal	  Psychiatry: A & O x 3, Mood & affect appropriate  HEENT:   Normal oral mucosa, PERRL, EOMI	  Lymphatic: No lymphadenopathy  Cardiovascular: Normal S1 S2,RRR, No JVD, No murmurs  Respiratory: Lungs clear to auscultation	  Gastrointestinal:  Soft, Non-tender, + BS	  Skin: No rashes, No ecchymoses, No cyanosis	  Neurologic: Non-focal  Extremities: Normal range of motion, No clubbing, cyanosis or edema  Vascular: Peripheral pulses palpable 2+ bilaterally    TELEMETRY: 	    ECG:  	rbbb, lafb  s tachy  no acute ischemic abnl   RADIOLOGY:  OTHER: 	  	  LABS:	 	    CARDIAC MARKERS:        proBNP:     Lipid Profile:   HgA1c:   TSH:                           7.1    11.14 )-----------( 136      ( 18 Jul 2022 07:19 )             20.9     07-18    141  |  112<H>  |  112<H>  ----------------------------<  283<H>  5.0   |  12<L>  |  1.88<H>    Ca    8.7      18 Jul 2022 07:19  Phos  2.2     07-17  Mg     1.90     07-17    TPro  6.4  /  Alb  3.5  /  TBili  0.3  /  DBili  x   /  AST  14  /  ALT  7   /  AlkPhos  44  07-16    PT/INR - ( 16 Jul 2022 20:30 )   PT: 13.1 sec;   INR: 1.13 ratio         PTT - ( 16 Jul 2022 20:30 )  PTT:28.7 sec    Creatinine, Serum: 1.88 mg/dL (07-18-22 @ 07:19)  Creatinine, Serum: 1.79 mg/dL (07-17-22 @ 11:35)  Creatinine, Serum: 2.53 mg/dL (07-16-22 @ 20:30)        ASSESSMENT/PLAN: 	              
HPI:    75 y/o M with PMH of T2DM, HTN, HLD, severe peripheral vascular disease, status post left right femoral-femoral bypass with right femoral-popliteal bypass graft in the past, history of femur fracture requiring multiple operations to fix the femur in 1990s, former smoker who quit in 1996 presenting with two episodes of hematemesis. Pt reports this has never happened before, first episode was Friday night, then on Saturday morning. Vomitus was coffee ground, sometimes had bright red blood, was approximately 1 cup in volume per pt. Had a EGD/colonoscopy approximately 1 year ago, reports no abnormalities as far as he can remember. Reports no abdominal pain, no diarrhea, no bloody stool, no hemoptysis no hematochezia, melena, dysuria, hematuria. Denied fever, chills, CP, SOB, nausea, vomiting.         PAST MEDICAL & SURGICAL HISTORY:  Hypertension      Diabetes mellitus      Glaucoma      Fracture of femur  Had surgery (1997 )      S/P appendectomy      S/P femoral-popliteal bypass surgery      History of inferior vena caval filter placement  [place] Filter      Femoral fracture  Right &amp; had surgery ( 1997 )      Allergies    No Known Allergies    Intolerances        OBJECTIVE:  ICU Vital Signs Last 24 Hrs  T(C): 36.7 (16 Jul 2022 21:31), Max: 36.7 (16 Jul 2022 21:31)  T(F): 98.1 (16 Jul 2022 21:31), Max: 98.1 (16 Jul 2022 21:31)  HR: 108 (16 Jul 2022 22:43) (105 - 121)  BP: 91/47 (16 Jul 2022 22:43) (91/47 - 125/98)  BP(mean): --  ABP: --  ABP(mean): --  RR: 20 (16 Jul 2022 22:43) (18 - 24)  SpO2: 100% (16 Jul 2022 22:43) (99% - 100%)    O2 Parameters below as of 16 Jul 2022 22:43  Patient On (Oxygen Delivery Method): room air              CAPILLARY BLOOD GLUCOSE        VITALS:   T(C): 36.7 (07-16-22 @ 21:31), Max: 36.7 (07-16-22 @ 21:31)  HR: 108 (07-16-22 @ 22:43) (105 - 121)  BP: 91/47 (07-16-22 @ 22:43) (91/47 - 125/98)  RR: 20 (07-16-22 @ 22:43) (18 - 24)  SpO2: 100% (07-16-22 @ 22:43) (99% - 100%)    Physical Exam:    GENERAL: NAD, lying in bed comfortably  HEAD:  Atraumatic, Normocephalic  EYES: EOMI, PERRLA, conjunctiva and sclera clear  ENT: Moist mucous membranes  NECK: Supple, No JVD  CHEST/LUNG: Clear to auscultation bilaterally; No rales, rhonchi, wheezing, or rubs. Unlabored respirations  HEART: Regular rate and rhythm; No murmurs, rubs, or gallops  ABDOMEN: BSx4; Soft, nontender, nondistended  EXTREMITIES:  2+ Peripheral Pulses, brisk capillary refill. No clubbing, cyanosis, or edema  NERVOUS SYSTEM:  A&Ox3, no focal deficits   SKIN: No rashes or lesions  psych: normal behavior, normal affect     HOSPITAL MEDICATIONS:  MEDICATIONS  (STANDING):    MEDICATIONS  (PRN):      LABS:                        6.6    4.60  )-----------( 192      ( 16 Jul 2022 22:37 )             20.3     07-16    137  |  104  |  114<H>  ----------------------------<  158<H>  5.3   |  17<L>  |  2.53<H>    Ca    9.8      16 Jul 2022 20:30    TPro  6.4  /  Alb  3.5  /  TBili  0.3  /  DBili  x   /  AST  14  /  ALT  7   /  AlkPhos  44  07-16    PT/INR - ( 16 Jul 2022 20:30 )   PT: 13.1 sec;   INR: 1.13 ratio         PTT - ( 16 Jul 2022 20:30 )  PTT:28.7 sec          MICROBIOLOGY:     RADIOLOGY:  [ ] Reviewed and interpreted by me    EKG:
CC: 76y old Male admitted with a chief complaint of Upper GI bleed, now    HPI: 76M with PMH of T2DM, HTN, HLD, severe peripheral vascular disease, status post left right femoral-femoral bypass with right femoral-popliteal bypass graft in the past admitted to medicine after presenting with two episodes of hematemesis. Pt reports this has never happened before, first episode was Friday night, then on Saturday morning. Vomitus was coffee ground, sometimes had bright red blood, was approximately 1 cup in volume per pt. Had a EGD/colonoscopy approximately 1 year ago, reports no abnormalities as far as he can remember. Currently hypotensive and tachycardic with H/H 5.3/15.7. Abd xr demonstrating air-filled loops of small bowel, surgery called to evaluate if patient has sbo, and if so could that be the cause of GIB. Patient currently denies N/V, is non distended, but does not know the last time he had a BM. He is not passing BRBPR or having episodes of bloody emesis.        PMHx: Hypertension    Diabetes mellitus    Glaucoma    Fracture of femur    Hyperlipidemia      PSHx: S/P appendectomy    S/P femoral-popliteal bypass surgery    History of inferior vena caval filter placement    Femoral fracture      Medications (inpatient): atorvastatin 80 milliGRAM(s) Oral at bedtime  dextrose 5%. 1000 milliLiter(s) IV Continuous <Continuous>  dextrose 5%. 1000 milliLiter(s) IV Continuous <Continuous>  dextrose 50% Injectable 25 Gram(s) IV Push once  dextrose 50% Injectable 12.5 Gram(s) IV Push once  dextrose 50% Injectable 25 Gram(s) IV Push once  glucagon  Injectable 1 milliGRAM(s) IntraMuscular once  insulin glargine Injectable (LANTUS) 4 Unit(s) SubCutaneous <User Schedule>  insulin lispro (ADMELOG) corrective regimen sliding scale   SubCutaneous every 6 hours  pantoprazole Infusion 8 mG/Hr IV Continuous <Continuous>  potassium phosphate / sodium phosphate Powder (PHOS-NaK) 1 Packet(s) Oral once  sodium chloride 0.9% Bolus 500 milliLiter(s) IV Bolus once  sodium chloride 0.9%. 1000 milliLiter(s) IV Continuous <Continuous>    Medications (PRN):dextrose Oral Gel 15 Gram(s) Oral once PRN  ondansetron Injectable 4 milliGRAM(s) IV Push every 8 hours PRN  oxycodone    5 mG/acetaminophen 325 mG 1 Tablet(s) Oral every 8 hours PRN    Allergies: No Known Allergies  (Intolerances: )  Social Hx:   Family Hx:     Physical Exam  T(C): 35.4  HR: 100 (90 - 115)  BP: 87/44 (83/61 - 136/68)  RR: 19 (16 - 20)  SpO2: 100% (95% - 100%)  Tmax: T(C): , Max: 36.8 (07-18-22 @ 09:25)    07-18-22  -  07-19-22  --------------------------------------------------------  IN:  Total IN: 0 mL    OUT:    Voided (mL): 250 mL  Total OUT: 250 mL    Total NET: -250 mL        General: appears uncomfortable and in pain  Neuro: lethargic but arousable  HEENT: NCAT, EOMI, anicteric, mucosa moist  Respiratory: airway patent, respirations unlabored  CVS: tachycardic, hypotensive  Abdomen: soft, nondistended, mildly tender to palpation  Extremities: no edema, sensation and movement grossly intact  Skin: warm, dry, appropriate color    Labs:                        5.3    15.78 )-----------( 132      ( 19 Jul 2022 04:44 )             15.7     PT/INR - ( 19 Jul 2022 04:44 )   PT: 16.4 sec;   INR: 1.41 ratio         PTT - ( 19 Jul 2022 04:44 )  PTT:24.9 sec  07-19    145  |  113<H>  |  120<H>  ----------------------------<  163<H>  4.6   |  10<LL>  |  2.58<H>    Ca    8.5      19 Jul 2022 04:44  Phos  5.2     07-19  Mg     2.10     07-19    TPro  4.4<L>  /  Alb  2.9<L>  /  TBili  0.7  /  DBili  x   /  AST  37  /  ALT  44<H>  /  AlkPhos  30<L>  07-19            Imaging and other studies:  xr< from: Xray Abdomen 1 View PORTABLE -Routine (07.18.22 @ 23:36) >  INTERPRETATION:  IMPRESSION: Multiple air-filled loops of bowel.    < end of copied text >    
[facility] - MARY ANN Finn / MARY ANN Lai / KEISHA Chisholm/ MARY ANN Polk/ MARY ANN Poon/ SUKH Pruitt / URIAH Garcia / ROE Abdul  -------------------------------------------------------------------------------------------------------  The patient seen and examined today.  HPI:  76 y.o male with DM, HTN, HLD, PVD s/p left right femoral-femoral bypass with right femoral-popliteal bypass graft, hx of femur fracture s/p surgical interventions who presents for two episodes of coffee ground emesis.     Patient report Friday night 7/15 he had a episode of vomiting black content. Saturday morning 7/16 when he was getting ready to come to the hospital, had another episode of dark emesis, about 2 big cups of dark content. He report this has never happen before. Denies any recent naproxen/ibuprofen or other NSAID use besides his daily baby aspirin. But endorses PCP prescribed a new medicine about 1 week ago and he was told to take everyday as the aspirin. Report no abdominal pain. He attempted to have something to eat Saturday morning however could not keep anything down. He endorses a prior EGD/colonoscopy but thinks this was on 2015, and was negative. Has been having regular bowel movements. He denies any dyspepsia, dark stools, hematochezia, shortness of breath, chest pain, dizziness, alcohol use, nausea.     Reviewed dispense report, patient got dispensed plavix 75mg on July 8/2020.  (17 Jul 2022 08:58)    Of note: patient coded today due to hypotension, upgraded to MICU, on 3 pressors, lactate 7, S/P 8 units PRBCs    PAST MEDICAL & SURGICAL HISTORY:  Hypertension      Diabetes mellitus      Glaucoma      Fracture of femur  Had surgery (1997 )      Hyperlipidemia      S/P appendectomy      S/P femoral-popliteal bypass surgery      History of inferior vena caval filter placement  [place] Filter      Femoral fracture  Right &amp; had surgery ( 1997 )        Allergies :- No Known Allergies    Home Medications Reviewed  Hospital Medications:   MEDICATIONS  (STANDING):  atorvastatin 80 milliGRAM(s) Oral at bedtime  calcium gluconate IVPB 1 Gram(s) IV Intermittent once  dextrose 5%. 1000 milliLiter(s) (50 mL/Hr) IV Continuous <Continuous>  dextrose 5%. 1000 milliLiter(s) (100 mL/Hr) IV Continuous <Continuous>  dextrose 50% Injectable 25 Gram(s) IV Push once  dextrose 50% Injectable 12.5 Gram(s) IV Push once  dextrose 50% Injectable 25 Gram(s) IV Push once  glucagon  Injectable 1 milliGRAM(s) IntraMuscular once  insulin glargine Injectable (LANTUS) 4 Unit(s) SubCutaneous <User Schedule>  insulin lispro (ADMELOG) corrective regimen sliding scale   SubCutaneous every 6 hours  lactated ringers Bolus 1000 milliLiter(s) IV Bolus once  metoclopramide Injectable 10 milliGRAM(s) IV Push once  pantoprazole Infusion 8 mG/Hr (10 mL/Hr) IV Continuous <Continuous>  phenylephrine    Infusion 0.5 MICROgram(s)/kG/Min (6.3 mL/Hr) IV Continuous <Continuous>  potassium phosphate / sodium phosphate Powder (PHOS-NaK) 1 Packet(s) Oral once  sodium bicarbonate  Infusion 0.223 mEq/kG/Hr (100 mL/Hr) IV Continuous <Continuous>  vasopressin Infusion 0.04 Unit(s)/Min (2.4 mL/Hr) IV Continuous <Continuous>    SOCIAL HISTORY:  Denies ETOh,Smoking,   FAMILY HISTORY:      REVIEW OF SYSTEMS: Obtained from chart  CONSTITUTIONAL: No weakness, fevers or chills  EYES/ENT: No visual changes;  No vertigo or throat pain   NECK: No pain or stiffness  RESPIRATORY: No cough, wheezing, hemoptysis; No shortness of breath  CARDIOVASCULAR: No chest pain or palpitations.  GASTROINTESTINAL: Coffee ground emesis  GENITOURINARY: No dysuria, frequency, foamy urine, urinary urgency, incontinence or hematuria  NEUROLOGICAL: No numbness or weakness  SKIN: No itching, burning, rashes, or lesions   VASCULAR: No bilateral lower extremity edema.   All other review of systems is negative unless indicated above.    VITALS:  T(F): 95.1 (07-19-22 @ 10:02), Max: 97.7 (07-18-22 @ 16:31)  HR: 103 (07-19-22 @ 10:02)  BP: 99/48 (07-19-22 @ 10:02)  RR: 18 (07-19-22 @ 10:02)  SpO2: 100% (07-19-22 @ 10:02)  Wt(kg): --    07-18 @ 07:01  -  07-19 @ 07:00  --------------------------------------------------------  IN: 0 mL / OUT: 250 mL / NET: -250 mL    07-19 @ 07:01  -  07-19 @ 15:40  --------------------------------------------------------  IN: 0 mL / OUT: 300 mL / NET: -300 mL        Weight (kg): 67.2 (07-18 @ 16:31)    PHYSICAL EXAM:  Constitutional: intubated, 2 IO lines, 1 peripheral, 1 arterial line on inplace  HEENT: anicteric sclera, oropharynx clear, MMM  Neck: supple.   Respiratory: Bilateral equal breath sounds , no wheezes, no crackles, intubated  Cardiovascular: S1, S2, Regular, Murmur present.  Gastrointestinal: Bowel Sound present, soft, NT/ND  Extremities: cold, cyanotic  Neurological: obtunded  :  hurt low UOP  Skin: No rashes    Data:  07-19    145  |  113<H>  |  120<H>  ----------------------------<  163<H>  4.6   |  10<LL>  |  2.58<H>    Ca    8.5      19 Jul 2022 04:44  Phos  5.2     07-19  Mg     2.10     07-19    TPro  4.4<L>  /  Alb  2.9<L>  /  TBili  0.7  /  DBili      /  AST  37  /  ALT  44<H>  /  AlkPhos  30<L>  07-19    Creatinine Trend: 2.58 <--, 2.43 <--, 1.88 <--, 1.79 <--, 2.53 <--                        5.3    15.78 )-----------( 132      ( 19 Jul 2022 04:44 )             15.7     Urine Studies:    Creatinine, Random Urine: 154 mg/dL (07-17 @ 11:56)  Protein/Creatinine Ratio Calculation: 0.0 Ratio (07-17 @ 11:56)  Sodium, Random Urine: 34 mmol/L (07-17 @ 11:56)  
  HPI:  77 y/o M with PMH of T2DM, HTN, HLD, severe peripheral vascular disease, status post left right femoral-femoral bypass with right femoral-popliteal bypass graft in the past, history of femur fracture requiring multiple operations to fix the femur in 1990s, former smoker who quit in 1996 presenting with two episodes of coffee ground emesis (7/15PM; 7/16AM). Described as black, coffee ground. Mild associated abd pain. Normal bowel movements during this time. No similar events in past. Not on antiplatelet/anticoagulation, no NSAID use. Had EGD/colon ~2013 (doesn't remember results). No FHx of GI malignancy.     On presentation patient tachy (110), BP stable; Hg 7.9->6.6 (1u) -> 7.3. BUN/Cr 114/2.53. Started on PPI. Seen by MICU.     Allergies:  No Known Allergies      Home Medications:    Hospital Medications:  atorvastatin 80 milliGRAM(s) Oral at bedtime  dextrose 5%. 1000 milliLiter(s) IV Continuous <Continuous>  dextrose 5%. 1000 milliLiter(s) IV Continuous <Continuous>  dextrose 50% Injectable 25 Gram(s) IV Push once  dextrose 50% Injectable 12.5 Gram(s) IV Push once  dextrose 50% Injectable 25 Gram(s) IV Push once  dextrose Oral Gel 15 Gram(s) Oral once PRN  glucagon  Injectable 1 milliGRAM(s) IntraMuscular once  insulin lispro (ADMELOG) corrective regimen sliding scale   SubCutaneous three times a day before meals  ondansetron Injectable 4 milliGRAM(s) IV Push every 8 hours PRN  oxycodone    5 mG/acetaminophen 325 mG 1 Tablet(s) Oral every 8 hours PRN  pantoprazole Infusion 8 mG/Hr IV Continuous <Continuous>      PMHX/PSHX:  Hypertension    Diabetes mellitus    Glaucoma    Fracture of femur    Hyperlipidemia    S/P appendectomy    S/P femoral-popliteal bypass surgery    History of inferior vena caval filter placement    Femoral fracture        Family history:      Denies family history of colon cancer/polyps, stomach cancer/polyps, pancreatic cancer/masses, liver cancer/disease, ovarian cancer and endometrial cancer.    Social History:   Tob: Denies  EtOH: Denies  Illicit Drugs: Denies    ROS:     General:  No wt loss, fevers, chills, night sweats, fatigue  Eyes:  Good vision, no reported pain  ENT:  No sore throat, pain, runny nose, dysphagia  CV:  No pain, palpitations, hypo/hypertension  Pulm:  No dyspnea, cough, tachypnea, wheezing  GI:  see HPI  :  No pain, bleeding, incontinence, nocturia  Muscle:  No pain, weakness  Neuro:  No weakness, tingling, memory problems  Psych:  No fatigue, insomnia, mood problems, depression  Endocrine:  No polyuria, polydipsia, cold/heat intolerance  Heme:  No petechiae, ecchymosis, easy bruisability  Skin:  No rash, tattoos, scars, edema    PHYSICAL EXAM:     GENERAL:  No acute distress  HEENT:  NCAT, no scleral icterus   CHEST:  no respiratory distress  HEART:  Regular rate and rhythm  ABDOMEN:  Soft, non-tender, non-distended, normoactive bowel sounds,  no masses  RECTAL: no stool  EXTREMITIES: No edema, surgical scars c/d/i  SKIN:  No rash/erythema/ecchymoses/petechiae/wounds/abscess/warm/dry  NEURO:  Alert and oriented x 3, no asterixis    Vital Signs:  Vital Signs Last 24 Hrs  T(C): 36.7 (17 Jul 2022 08:49), Max: 37.3 (17 Jul 2022 03:48)  T(F): 98 (17 Jul 2022 08:49), Max: 99.2 (17 Jul 2022 03:48)  HR: 99 (17 Jul 2022 08:49) (90 - 121)  BP: 113/64 (17 Jul 2022 08:49) (77/44 - 125/98)  BP(mean): 72 (17 Jul 2022 08:49) (55 - 73)  RR: 16 (17 Jul 2022 08:49) (15 - 24)  SpO2: 100% (17 Jul 2022 08:49) (98% - 100%)    Parameters below as of 17 Jul 2022 08:49  Patient On (Oxygen Delivery Method): room air      Daily Height in cm: 175.26 (16 Jul 2022 18:42)    Daily     LABS:                        7.3    5.95  )-----------( 173      ( 17 Jul 2022 04:30 )             22.2     Mean Cell Volume: 94.1 fL (07-17-22 @ 04:30)    07-16    137  |  104  |  114<H>  ----------------------------<  158<H>  5.3   |  17<L>  |  2.53<H>    Ca    9.8      16 Jul 2022 20:30    TPro  6.4  /  Alb  3.5  /  TBili  0.3  /  DBili  x   /  AST  14  /  ALT  7   /  AlkPhos  44  07-16    LIVER FUNCTIONS - ( 16 Jul 2022 20:30 )  Alb: 3.5 g/dL / Pro: 6.4 g/dL / ALK PHOS: 44 U/L / ALT: 7 U/L / AST: 14 U/L / GGT: x           PT/INR - ( 16 Jul 2022 20:30 )   PT: 13.1 sec;   INR: 1.13 ratio         PTT - ( 16 Jul 2022 20:30 )  PTT:28.7 sec                            7.3    5.95  )-----------( 173      ( 17 Jul 2022 04:30 )             22.2                         6.6    4.60  )-----------( 192      ( 16 Jul 2022 22:37 )             20.3                         7.9    6.21  )-----------( 254      ( 16 Jul 2022 20:30 )             23.5       Imaging:          
HPI:    75 y/o M with PMH of T2DM, HTN, HLD, severe peripheral vascular disease, status post left right femoral-femoral bypass with right femoral-popliteal bypass graft in the past, history of femur fracture requiring multiple operations to fix the femur in 1990s, former smoker who quit in 1996 presenting with two episodes of hematemesis. Pt reports this has never happened before, first episode was Friday night, then on Saturday morning. Vomitus was coffee ground, sometimes had bright red blood, was approximately 1 cup in volume per pt. Had a EGD/colonoscopy approximately 1 year ago, reports no abnormalities as far as he can remember. Reports no abdominal pain, no diarrhea, no bloody stool, no hemoptysis no hematochezia, melena, dysuria, hematuria. Denied fever, chills, CP, SOB, nausea, vomiting.     MICU reconsulted for hypotension to SBP 80s, Hgb 5.6,  c/f UGIB. Plan for EGD later today with GI.        PAST MEDICAL & SURGICAL HISTORY:  Hypertension      Diabetes mellitus      Glaucoma      Fracture of femur  Had surgery (1997 )      Hyperlipidemia      S/P appendectomy      S/P femoral-popliteal bypass surgery      History of inferior vena caval filter placement  Deaver Filter      Femoral fracture  Right &amp; had surgery ( 1997 )          FAMILY HISTORY:    Allergies    No Known Allergies    Intolerances        HOME MEDICATIONS:    OBJECTIVE:  ICU Vital Signs Last 24 Hrs  T(C): 36.4 (19 Jul 2022 03:45), Max: 36.8 (18 Jul 2022 09:25)  T(F): 97.6 (19 Jul 2022 03:45), Max: 98.2 (18 Jul 2022 09:25)  HR: 104 (19 Jul 2022 04:00) (90 - 115)  BP: 86/59 (19 Jul 2022 04:00) (83/61 - 136/68)  BP(mean): --  ABP: --  ABP(mean): --  RR: 18 (19 Jul 2022 03:45) (16 - 20)  SpO2: 100% (19 Jul 2022 03:45) (95% - 100%)    O2 Parameters below as of 18 Jul 2022 16:31  Patient On (Oxygen Delivery Method): room air              07-18 @ 07:01  -  07-19 @ 05:05  --------------------------------------------------------  IN: 0 mL / OUT: 250 mL / NET: -250 mL      CAPILLARY BLOOD GLUCOSE      POCT Blood Glucose.: 214 mg/dL (19 Jul 2022 01:51)      VITALS:   T(C): 36.4 (07-19-22 @ 03:45), Max: 36.8 (07-18-22 @ 09:25)  HR: 104 (07-19-22 @ 04:00) (90 - 115)  BP: 86/59 (07-19-22 @ 04:00) (83/61 - 136/68)  RR: 18 (07-19-22 @ 03:45) (16 - 20)  SpO2: 100% (07-19-22 @ 03:45) (95% - 100%)    GENERAL: NAD, lying in bed comfortably  HEAD:  Atraumatic, Normocephalic  EYES: EOMI, PERRLA, conjunctiva and sclera clear  ENT: Moist mucous membranes  NECK: Supple, No JVD  CHEST/LUNG: Clear to auscultation bilaterally; No rales, rhonchi, wheezing, or rubs. Unlabored respirations  HEART: Regular rate and rhythm; No murmurs, rubs, or gallops  ABDOMEN: BSx4; Soft, nontender, nondistended  EXTREMITIES:  2+ Peripheral Pulses, brisk capillary refill. No clubbing, cyanosis, or edema  NERVOUS SYSTEM:  A&Ox3, no focal deficits   SKIN: No rashes or lesions  psych: normal behavior, normal affect       HOSPITAL MEDICATIONS:  MEDICATIONS  (STANDING):  atorvastatin 80 milliGRAM(s) Oral at bedtime  dextrose 5%. 1000 milliLiter(s) (50 mL/Hr) IV Continuous <Continuous>  dextrose 5%. 1000 milliLiter(s) (100 mL/Hr) IV Continuous <Continuous>  dextrose 50% Injectable 25 Gram(s) IV Push once  dextrose 50% Injectable 12.5 Gram(s) IV Push once  dextrose 50% Injectable 25 Gram(s) IV Push once  glucagon  Injectable 1 milliGRAM(s) IntraMuscular once  insulin glargine Injectable (LANTUS) 4 Unit(s) SubCutaneous <User Schedule>  insulin lispro (ADMELOG) corrective regimen sliding scale   SubCutaneous every 6 hours  pantoprazole Infusion 8 mG/Hr (10 mL/Hr) IV Continuous <Continuous>  potassium phosphate / sodium phosphate Powder (PHOS-NaK) 1 Packet(s) Oral once  sodium chloride 0.9% Bolus 500 milliLiter(s) IV Bolus once  sodium chloride 0.9%. 1000 milliLiter(s) (75 mL/Hr) IV Continuous <Continuous>    MEDICATIONS  (PRN):  dextrose Oral Gel 15 Gram(s) Oral once PRN Blood Glucose LESS THAN 70 milliGRAM(s)/deciliter  ondansetron Injectable 4 milliGRAM(s) IV Push every 8 hours PRN Nausea and/or Vomiting  oxycodone    5 mG/acetaminophen 325 mG 1 Tablet(s) Oral every 8 hours PRN Severe Pain (7 - 10)      LABS:                        5.6    13.76 )-----------( 101      ( 19 Jul 2022 03:37 )             17.6     07-19    144  |  113<H>  |  122<H>  ----------------------------<  176<H>  5.3   |  11<L>  |  2.43<H>    Ca    8.8      19 Jul 2022 03:37  Phos  5.2     07-19  Mg     2.10     07-19      PT/INR - ( 19 Jul 2022 04:44 )   PT: 16.4 sec;   INR: 1.41 ratio         PTT - ( 19 Jul 2022 04:44 )  PTT:24.9 sec

## 2022-07-19 NOTE — CHART NOTE - NSCHARTNOTEFT_GEN_A_CORE
MICU Accept Note    CHIEF COMPLAINT: UGIB    HPI / INTERVAL HISTORY:    77 y/o M with PMH of T2DM, HTN, HLD, severe peripheral vascular disease, status post left right femoral-femoral bypass with right femoral-popliteal bypass graft in the past, history of femur fracture requiring multiple operations to fix the femur in 1990s, former smoker who quit in 1996 presenting with two episodes of hematemesis. Pt reports this has never happened before, first episode was Friday night, then on Saturday morning. Vomitus was coffee ground, sometimes had bright red blood, was approximately 1 cup in volume per pt. Had a EGD/colonoscopy approximately 1 year ago, reports no abnormalities as far as he can remember. Reports no abdominal pain, no diarrhea, no bloody stool, no hemoptysis no hematochezia, melena, dysuria, hematuria. Denied fever, chills, CP, SOB, nausea, vomiting.     MICU reconsulted for hypotension to SBP 80s, Hgb 5.6,  c/f UGIB. Plan for EGD later today with GI. Repeat Hgb was 5.3, ordered for CTA A/P (bleeding scan) to evaluate for source of GI bleed.      PAST MEDICAL & SURGICAL HISTORY:  Hypertension      Diabetes mellitus      Glaucoma      Fracture of femur  Had surgery (1997 )      Hyperlipidemia      S/P appendectomy      S/P femoral-popliteal bypass surgery      History of inferior vena caval filter placement  [place] Filter      Femoral fracture  Right &amp; had surgery ( 1997 )      Advance Directives:     HOME MEDICATIONS:      Allergies    No Known Allergies    Intolerances        OBJECTIVE:  ICU Vital Signs Last 24 Hrs  T(C): 36.4 (19 Jul 2022 05:25), Max: 36.8 (18 Jul 2022 09:25)  T(F): 97.5 (19 Jul 2022 05:25), Max: 98.2 (18 Jul 2022 09:25)  HR: 104 (19 Jul 2022 05:25) (90 - 115)  BP: 93/41 (19 Jul 2022 05:25) (83/61 - 136/68)  BP(mean): --  ABP: --  ABP(mean): --  RR: 19 (19 Jul 2022 05:25) (16 - 20)  SpO2: 100% (19 Jul 2022 05:25) (95% - 100%)    O2 Parameters below as of 19 Jul 2022 05:25  Patient On (Oxygen Delivery Method): nasal cannula,2              07-18 @ 07:01  -  07-19 @ 05:26  --------------------------------------------------------  IN: 0 mL / OUT: 250 mL / NET: -250 mL      CAPILLARY BLOOD GLUCOSE      POCT Blood Glucose.: 214 mg/dL (19 Jul 2022 01:51)      PHYSICAL EXAM:  GENERAL: NAD, lying in bed comfortably  HEAD:  Atraumatic, Normocephalic  EYES: EOMI, PERRLA, conjunctiva and sclera clear  ENT: Moist mucous membranes  NECK: Supple, No JVD  CHEST/LUNG: Clear to auscultation bilaterally; No rales, rhonchi, wheezing, or rubs. Unlabored respirations  HEART: Regular rate and rhythm; No murmurs, rubs, or gallops  ABDOMEN: BSx4; Soft, nontender, nondistended  EXTREMITIES:  2+ Peripheral Pulses, brisk capillary refill. No clubbing, cyanosis, or edema  NERVOUS SYSTEM:  A&Ox3, no focal deficits   SKIN: No rashes or lesions  psych: normal behavior, normal affect     LINES:     HOSPITAL MEDICATIONS:  MEDICATIONS  (STANDING):  atorvastatin 80 milliGRAM(s) Oral at bedtime  dextrose 5%. 1000 milliLiter(s) (50 mL/Hr) IV Continuous <Continuous>  dextrose 5%. 1000 milliLiter(s) (100 mL/Hr) IV Continuous <Continuous>  dextrose 50% Injectable 25 Gram(s) IV Push once  dextrose 50% Injectable 12.5 Gram(s) IV Push once  dextrose 50% Injectable 25 Gram(s) IV Push once  glucagon  Injectable 1 milliGRAM(s) IntraMuscular once  insulin glargine Injectable (LANTUS) 4 Unit(s) SubCutaneous <User Schedule>  insulin lispro (ADMELOG) corrective regimen sliding scale   SubCutaneous every 6 hours  pantoprazole Infusion 8 mG/Hr (10 mL/Hr) IV Continuous <Continuous>  potassium phosphate / sodium phosphate Powder (PHOS-NaK) 1 Packet(s) Oral once  sodium chloride 0.9% Bolus 500 milliLiter(s) IV Bolus once  sodium chloride 0.9%. 1000 milliLiter(s) (75 mL/Hr) IV Continuous <Continuous>    MEDICATIONS  (PRN):  dextrose Oral Gel 15 Gram(s) Oral once PRN Blood Glucose LESS THAN 70 milliGRAM(s)/deciliter  ondansetron Injectable 4 milliGRAM(s) IV Push every 8 hours PRN Nausea and/or Vomiting  oxycodone    5 mG/acetaminophen 325 mG 1 Tablet(s) Oral every 8 hours PRN Severe Pain (7 - 10)      LABS:                        5.3    15.78 )-----------( 132      ( 19 Jul 2022 04:44 )             15.7     Hgb Trend: 5.3<--, 5.6<--, 8.7<--, 7.1<--, 7.8<--  07-19    145  |  113<H>  |  120<H>  ----------------------------<  163<H>  4.6   |  10<LL>  |  2.58<H>    Ca    8.5      19 Jul 2022 04:44  Phos  5.2     07-19  Mg     2.10     07-19    TPro  4.4<L>  /  Alb  2.9<L>  /  TBili  0.7  /  DBili  x   /  AST  37  /  ALT  44<H>  /  AlkPhos  30<L>  07-19    Creatinine Trend: 2.58<--, 2.43<--, 1.88<--, 1.79<--, 2.53<--  PT/INR - ( 19 Jul 2022 04:44 )   PT: 16.4 sec;   INR: 1.41 ratio         PTT - ( 19 Jul 2022 04:44 )  PTT:24.9 sec          MICROBIOLOGY:     RADIOLOGY & ADDITIONAL TESTS:      ASSESSMENT AND PLAN:  77 y/o M with PMH of T2DM, HTN, HLD, severe peripheral vascular disease, status post left right femoral-femoral bypass with right femoral-popliteal bypass graft in the past, history of femur fracture requiring multiple operations to fix the femur in 1990s, former smoker who quit in 1996 presenting with two episodes of hematemesis. MICU reconsulted for hypotension and Hgb 5.9  2/2 likely UGIB.      #Neuro    #Cardiovascular    #Respiratory    #GI/Nutrition    #/Renal    #Skin    #ID    #Endocrine    #Hematologic/DVT ppx    #Ethics      Sal Boles MD MICU Accept Note    CHIEF COMPLAINT: UGIB    HPI / INTERVAL HISTORY:    75 y/o M with PMH of T2DM, HTN, HLD, severe peripheral vascular disease, status post left right femoral-femoral bypass with right femoral-popliteal bypass graft in the past, history of femur fracture requiring multiple operations to fix the femur in 1990s, former smoker who quit in 1996 presenting with two episodes of hematemesis. Pt reports this has never happened before, first episode was Friday night, then on Saturday morning. Vomitus was coffee ground, sometimes had bright red blood, was approximately 1 cup in volume per pt. Had a EGD/colonoscopy approximately 1 year ago, reports no abnormalities as far as he can remember. Reports no abdominal pain, no diarrhea, no bloody stool, no hemoptysis no hematochezia, melena, dysuria, hematuria. Denied fever, chills, CP, SOB, nausea, vomiting.     MICU reconsulted for hypotension to SBP 80s, Hgb 5.6,  c/f UGIB. Plan for EGD later today with GI. Repeat Hgb was 5.3, ordered for CTA A/P (bleeding scan) to evaluate for source of GI bleed. Pt was ordered for 2u PRBC.  Prior to transfer to MICU, pt became hypotensive SBP~30s, then subsequently went into cardiac arrest. Code blue was called, pt found to be in asystole/PEA, received 4 epi's, 2 amps of bicarb and ROSC was achieved after approximately ~12.5 minutes.  Post arrest 1 additional u PRBC ordered,  pt received he was started propofol for sedation. Pressor support w/ levo, amber gtt maxed, received 1 addition amp bicarb, fent 50x2.  ABG obtained which showed pH 6.9, pCO2 25, pO2 204, lactate 7.3.   Upon transfer to MICU pt remain hypotensive ~SBP 50, vaso gtt was added. He also received additional bicarb pushes and started on bicarb gtt for pH 6.9. Also given 1L LR. POCUS showed collapsed IVC, preserved LV function. OGT placed to suction was noted w/ bright red blood.      PAST MEDICAL & SURGICAL HISTORY:  Hypertension      Diabetes mellitus      Glaucoma      Fracture of femur  Had surgery (1997 )      Hyperlipidemia      S/P appendectomy      S/P femoral-popliteal bypass surgery      History of inferior vena caval filter placement  [place] Filter      Femoral fracture  Right &amp; had surgery ( 1997 )      Advance Directives:     HOME MEDICATIONS:      Allergies    No Known Allergies    Intolerances        OBJECTIVE:  ICU Vital Signs Last 24 Hrs  T(C): 36.4 (19 Jul 2022 05:25), Max: 36.8 (18 Jul 2022 09:25)  T(F): 97.5 (19 Jul 2022 05:25), Max: 98.2 (18 Jul 2022 09:25)  HR: 104 (19 Jul 2022 05:25) (90 - 115)  BP: 93/41 (19 Jul 2022 05:25) (83/61 - 136/68)  BP(mean): --  ABP: --  ABP(mean): --  RR: 19 (19 Jul 2022 05:25) (16 - 20)  SpO2: 100% (19 Jul 2022 05:25) (95% - 100%)    O2 Parameters below as of 19 Jul 2022 05:25  Patient On (Oxygen Delivery Method): nasal cannula,2              07-18 @ 07:01  -  07-19 @ 05:26  --------------------------------------------------------  IN: 0 mL / OUT: 250 mL / NET: -250 mL      CAPILLARY BLOOD GLUCOSE      POCT Blood Glucose.: 214 mg/dL (19 Jul 2022 01:51)      PHYSICAL EXAM:  GENERAL: NAD, lying in bed comfortably  HEAD:  Atraumatic, Normocephalic  EYES: EOMI, PERRLA, conjunctiva and sclera clear  ENT: Moist mucous membranes  NECK: Supple, No JVD  CHEST/LUNG: Clear to auscultation bilaterally; No rales, rhonchi, wheezing, or rubs. Unlabored respirations  HEART: Regular rate and rhythm; No murmurs, rubs, or gallops  ABDOMEN: BSx4; Soft, nontender, nondistended  EXTREMITIES:  2+ Peripheral Pulses, brisk capillary refill. No clubbing, cyanosis, or edema  NERVOUS SYSTEM:  A&Ox3, no focal deficits   SKIN: No rashes or lesions  psych: normal behavior, normal affect     LINES:     HOSPITAL MEDICATIONS:  MEDICATIONS  (STANDING):  atorvastatin 80 milliGRAM(s) Oral at bedtime  dextrose 5%. 1000 milliLiter(s) (50 mL/Hr) IV Continuous <Continuous>  dextrose 5%. 1000 milliLiter(s) (100 mL/Hr) IV Continuous <Continuous>  dextrose 50% Injectable 25 Gram(s) IV Push once  dextrose 50% Injectable 12.5 Gram(s) IV Push once  dextrose 50% Injectable 25 Gram(s) IV Push once  glucagon  Injectable 1 milliGRAM(s) IntraMuscular once  insulin glargine Injectable (LANTUS) 4 Unit(s) SubCutaneous <User Schedule>  insulin lispro (ADMELOG) corrective regimen sliding scale   SubCutaneous every 6 hours  pantoprazole Infusion 8 mG/Hr (10 mL/Hr) IV Continuous <Continuous>  potassium phosphate / sodium phosphate Powder (PHOS-NaK) 1 Packet(s) Oral once  sodium chloride 0.9% Bolus 500 milliLiter(s) IV Bolus once  sodium chloride 0.9%. 1000 milliLiter(s) (75 mL/Hr) IV Continuous <Continuous>    MEDICATIONS  (PRN):  dextrose Oral Gel 15 Gram(s) Oral once PRN Blood Glucose LESS THAN 70 milliGRAM(s)/deciliter  ondansetron Injectable 4 milliGRAM(s) IV Push every 8 hours PRN Nausea and/or Vomiting  oxycodone    5 mG/acetaminophen 325 mG 1 Tablet(s) Oral every 8 hours PRN Severe Pain (7 - 10)      LABS:                        5.3    15.78 )-----------( 132      ( 19 Jul 2022 04:44 )             15.7     Hgb Trend: 5.3<--, 5.6<--, 8.7<--, 7.1<--, 7.8<--  07-19    145  |  113<H>  |  120<H>  ----------------------------<  163<H>  4.6   |  10<LL>  |  2.58<H>    Ca    8.5      19 Jul 2022 04:44  Phos  5.2     07-19  Mg     2.10     07-19    TPro  4.4<L>  /  Alb  2.9<L>  /  TBili  0.7  /  DBili  x   /  AST  37  /  ALT  44<H>  /  AlkPhos  30<L>  07-19    Creatinine Trend: 2.58<--, 2.43<--, 1.88<--, 1.79<--, 2.53<--  PT/INR - ( 19 Jul 2022 04:44 )   PT: 16.4 sec;   INR: 1.41 ratio         PTT - ( 19 Jul 2022 04:44 )  PTT:24.9 sec          MICROBIOLOGY:     RADIOLOGY & ADDITIONAL TESTS:      ASSESSMENT AND PLAN:  75 y/o M with PMH of T2DM, HTN, HLD, severe peripheral vascular disease, status post left right femoral-femoral bypass with right femoral-popliteal bypass graft in the past, history of femur fracture requiring multiple operations to fix the femur in 1990s, former smoker who quit in 1996 presenting with two episodes of hematemesis. MICU reconsulted for hypotension and Hgb 5.9  2/2 likely UGIB.      #Neuro    #Cardiovascular    #Respiratory    #GI/Nutrition    #/Renal    #Skin    #ID    #Endocrine    #Hematologic/DVT ppx    #Ethics      Sal Boles MD MICU Accept Note    CHIEF COMPLAINT: UGIB    HPI / INTERVAL HISTORY:    77 y/o M with PMH of T2DM, HTN, HLD, severe peripheral vascular disease, status post left right femoral-femoral bypass with right femoral-popliteal bypass graft in the past, history of femur fracture requiring multiple operations to fix the femur in 1990s, former smoker who quit in 1996 presenting with two episodes of hematemesis. Pt reports this has never happened before, first episode was Friday night, then on Saturday morning. Vomitus was coffee ground, sometimes had bright red blood, was approximately 1 cup in volume per pt. Had a EGD/colonoscopy approximately 1 year ago, reports no abnormalities as far as he can remember. Reports no abdominal pain, no diarrhea, no bloody stool, no hemoptysis no hematochezia, melena, dysuria, hematuria. Denied fever, chills, CP, SOB, nausea, vomiting.     MICU reconsulted for hypotension to SBP 80s, Hgb 5.6,  c/f UGIB. Plan for EGD later today with GI. Repeat Hgb was 5.3, ordered for CTA A/P (bleeding scan) to evaluate for source of GI bleed. Pt was ordered for 2u PRBC.  Prior to transfer to MICU, pt became hypotensive SBP~30s, then subsequently went into cardiac arrest. Code blue was called, pt found to be in asystole/PEA, received 4 epi's, 2 amps of bicarb and ROSC was achieved after approximately ~12.5 minutes.  Post arrest 1 additional u PRBC ordered,  pt received he was started propofol for sedation. Pressor support w/ levo, amber gtt maxed, received 1 addition amp bicarb, fent 50x2.  ABG obtained which showed pH 6.9, pCO2 25, pO2 204, lactate 7.3.  Upon transfer to MICU pt remain hypotensive ~SBP 50, vaso gtt was added. He also received additional bicarb pushes and started on bicarb gtt for pH 6.9. Also given 1L LR. POCUS showed collapsed IVC, preserved LV function. OGT placed to suction was noted w/ bright red blood.      PAST MEDICAL & SURGICAL HISTORY:  Hypertension      Diabetes mellitus      Glaucoma      Fracture of femur  Had surgery (1997 )      Hyperlipidemia      S/P appendectomy      S/P femoral-popliteal bypass surgery      History of inferior vena caval filter placement  [place] Filter      Femoral fracture  Right &amp; had surgery ( 1997 )      Advance Directives:     HOME MEDICATIONS:      Allergies    No Known Allergies    Intolerances      OBJECTIVE:  ICU Vital Signs Last 24 Hrs  T(C): 36.4 (19 Jul 2022 05:25), Max: 36.8 (18 Jul 2022 09:25)  T(F): 97.5 (19 Jul 2022 05:25), Max: 98.2 (18 Jul 2022 09:25)  HR: 104 (19 Jul 2022 05:25) (90 - 115)  BP: 93/41 (19 Jul 2022 05:25) (83/61 - 136/68)  BP(mean): --  ABP: --  ABP(mean): --  RR: 19 (19 Jul 2022 05:25) (16 - 20)  SpO2: 100% (19 Jul 2022 05:25) (95% - 100%)    O2 Parameters below as of 19 Jul 2022 05:25  Patient On (Oxygen Delivery Method): nasal cannula,2              07-18 @ 07:01  -  07-19 @ 05:26  --------------------------------------------------------  IN: 0 mL / OUT: 250 mL / NET: -250 mL      CAPILLARY BLOOD GLUCOSE      POCT Blood Glucose.: 214 mg/dL (19 Jul 2022 01:51)      PHYSICAL EXAM:  GENERAL: NAD, lying in bed comfortably  HEAD:  Atraumatic, Normocephalic  EYES: EOMI, PERRLA, conjunctiva and sclera clear  ENT: Moist mucous membranes  NECK: Supple, No JVD  CHEST/LUNG: Clear to auscultation bilaterally; No rales, rhonchi, wheezing, or rubs. Unlabored respirations  HEART: Regular rate and rhythm; No murmurs, rubs, or gallops  ABDOMEN: BSx4; Soft, nontender, nondistended  EXTREMITIES:  2+ Peripheral Pulses, brisk capillary refill. No clubbing, cyanosis, or edema  NERVOUS SYSTEM:  A&Ox3, no focal deficits   SKIN: No rashes or lesions  psych: normal behavior, normal affect     LINES:     HOSPITAL MEDICATIONS:  MEDICATIONS  (STANDING):  atorvastatin 80 milliGRAM(s) Oral at bedtime  dextrose 5%. 1000 milliLiter(s) (50 mL/Hr) IV Continuous <Continuous>  dextrose 5%. 1000 milliLiter(s) (100 mL/Hr) IV Continuous <Continuous>  dextrose 50% Injectable 25 Gram(s) IV Push once  dextrose 50% Injectable 12.5 Gram(s) IV Push once  dextrose 50% Injectable 25 Gram(s) IV Push once  glucagon  Injectable 1 milliGRAM(s) IntraMuscular once  insulin glargine Injectable (LANTUS) 4 Unit(s) SubCutaneous <User Schedule>  insulin lispro (ADMELOG) corrective regimen sliding scale   SubCutaneous every 6 hours  pantoprazole Infusion 8 mG/Hr (10 mL/Hr) IV Continuous <Continuous>  potassium phosphate / sodium phosphate Powder (PHOS-NaK) 1 Packet(s) Oral once  sodium chloride 0.9% Bolus 500 milliLiter(s) IV Bolus once  sodium chloride 0.9%. 1000 milliLiter(s) (75 mL/Hr) IV Continuous <Continuous>    MEDICATIONS  (PRN):  dextrose Oral Gel 15 Gram(s) Oral once PRN Blood Glucose LESS THAN 70 milliGRAM(s)/deciliter  ondansetron Injectable 4 milliGRAM(s) IV Push every 8 hours PRN Nausea and/or Vomiting  oxycodone    5 mG/acetaminophen 325 mG 1 Tablet(s) Oral every 8 hours PRN Severe Pain (7 - 10)      LABS:                        5.3    15.78 )-----------( 132      ( 19 Jul 2022 04:44 )             15.7     Hgb Trend: 5.3<--, 5.6<--, 8.7<--, 7.1<--, 7.8<--  07-19    145  |  113<H>  |  120<H>  ----------------------------<  163<H>  4.6   |  10<LL>  |  2.58<H>    Ca    8.5      19 Jul 2022 04:44  Phos  5.2     07-19  Mg     2.10     07-19    TPro  4.4<L>  /  Alb  2.9<L>  /  TBili  0.7  /  DBili  x   /  AST  37  /  ALT  44<H>  /  AlkPhos  30<L>  07-19    Creatinine Trend: 2.58<--, 2.43<--, 1.88<--, 1.79<--, 2.53<--  PT/INR - ( 19 Jul 2022 04:44 )   PT: 16.4 sec;   INR: 1.41 ratio         PTT - ( 19 Jul 2022 04:44 )  PTT:24.9 sec          MICROBIOLOGY:     RADIOLOGY & ADDITIONAL TESTS:      ASSESSMENT AND PLAN:  77 y/o M with PMH of T2DM, HTN, HLD, severe peripheral vascular disease, status post left right femoral-femoral bypass with right femoral-popliteal bypass graft in the past, history of femur fracture requiring multiple operations to fix the femur in 1990s, former smoker who quit in 1996 presenting with two episodes of hematemesis. MICU reconsulted for hypotension and Hgb 5.9  2/2 likely UGIB. S/p cardiac arrest on 7/19 prior to MICU transfer w/ ROSC.       #Neuro  -baseline A&Ox3  - noted to wake up purposefully following cardiac arrest  - continue propofol for sedation    #Cardiovascular  PEA arrest likely 2/2 hemorrhagic shock  - 7/19- Prior to transfer to MICU, pt became hypotensive SBP~30s, then subsequently went into cardiac arrest. Code blue was called, pt found to be in PEA, received 4 epi's, 2 amps of bicarb and ROSC was achieved after approximately ~12.5 minutes.  - maxxed on levo, amber, vaso  - s/p 4 uPRBC, plan for 2 additional  - s/p 1L LR and started on bicarb gtt @100cc/hr for acidosis pH 6.9  - EKG NSR w/ RBBB TWI in aVR, aVL  -     #Respiratory  intubated s/p cardiac arrest 7/19  - vent settings 23/ / 5/50%  -     #GI/Nutrition  GIB, hemorrhagic shock  - presented w/ coffee ground emesis    #/Renal    #Skin    #ID    #Endocrine    #Hematologic/DVT ppx    #Ethics      Sal Boles MD MICU Accept Note    CHIEF COMPLAINT: UGIB    HPI / INTERVAL HISTORY:    75 y/o M with PMH of T2DM, HTN, HLD, severe peripheral vascular disease, status post left right femoral-femoral bypass with right femoral-popliteal bypass graft in the past, history of femur fracture requiring multiple operations to fix the femur in 1990s, former smoker who quit in 1996 presenting with two episodes of hematemesis. Pt reports this has never happened before, first episode was Friday night, then on Saturday morning. Vomitus was coffee ground, sometimes had bright red blood, was approximately 1 cup in volume per pt. Had a EGD/colonoscopy approximately 1 year ago, reports no abnormalities as far as he can remember. Reports no abdominal pain, no diarrhea, no bloody stool, no hemoptysis no hematochezia, melena, dysuria, hematuria. Denied fever, chills, CP, SOB, nausea, vomiting.     MICU reconsulted for hypotension to SBP 80s, Hgb 5.6,  c/f UGIB. Plan for EGD later today (7/19) with GI. Repeat Hgb was 5.3, ordered for bleeding scan to evaluate for source of GI bleed. Pt was also ordered for 2u PRBC. Pt accepted to MICU for close monitoring.  Prior to transfer to MICU, pt became hypotensive SBP~30s, then subsequently went into cardiac arrest. Code blue was called, pt found to be in PEA, received 4 epi's, 2 amps of bicarb and ROSC was achieved after approximately ~12.5 minutes.  Post arrest 1 additional u PRBC ordered. He was started propofol for sedation. Required pressor support w/ levo, amber gtt maxed. Also received 1 addition amp bicarb, fent 50x2.    ABG obtained which showed pH 6.9, pCO2 25, pO2 204, lactate 7.3. Of note observed to have woken up following arrest.  Upon transfer to MICU pt remained hypotensive ~SBP 50, vaso gtt was added. He also received additional bicarb pushes and started on bicarb gtt for pH 6.9.   POCUS showed collapsed IVC, preserved LV function. 1L LR bolus was ordered. OGT placed to suction was noted w/ bright red blood.   After receiving total of 4uPRBC Hb improved to 8.6.       PAST MEDICAL & SURGICAL HISTORY:  Hypertension      Diabetes mellitus      Glaucoma      Fracture of femur  Had surgery (1997 )      Hyperlipidemia      S/P appendectomy      S/P femoral-popliteal bypass surgery      History of inferior vena caval filter placement  [place] Filter      Femoral fracture  Right &amp; had surgery ( 1997 )      Advance Directives:     HOME MEDICATIONS:      Allergies    No Known Allergies    Intolerances      OBJECTIVE:  ICU Vital Signs Last 24 Hrs  T(C): 36.4 (19 Jul 2022 05:25), Max: 36.8 (18 Jul 2022 09:25)  T(F): 97.5 (19 Jul 2022 05:25), Max: 98.2 (18 Jul 2022 09:25)  HR: 104 (19 Jul 2022 05:25) (90 - 115)  BP: 93/41 (19 Jul 2022 05:25) (83/61 - 136/68)  BP(mean): --  ABP: --  ABP(mean): --  RR: 19 (19 Jul 2022 05:25) (16 - 20)  SpO2: 100% (19 Jul 2022 05:25) (95% - 100%)    O2 Parameters below as of 19 Jul 2022 05:25  Patient On (Oxygen Delivery Method): nasal cannula,2              07-18 @ 07:01  -  07-19 @ 05:26  --------------------------------------------------------  IN: 0 mL / OUT: 250 mL / NET: -250 mL      CAPILLARY BLOOD GLUCOSE      POCT Blood Glucose.: 214 mg/dL (19 Jul 2022 01:51)      PHYSICAL EXAM:  GENERAL: NAD, sedated  HEAD:  Atraumatic, Normocephalic  EYES: EOMI, PERRLA, conjunctiva and sclera clear  ENT: Moist mucous membranes  NECK: Supple, No JVD  CHEST/LUNG: ETT in place, on mechanical ventialtion. Clear to auscultation bilaterally; No rales, rhonchi, wheezing, or rubs.   HEART: Regular rate and rhythm; No murmurs, rubs, or gallops  ABDOMEN: BSx4; Soft, nontender, nondistended  EXTREMITIES:  2+ Peripheral Pulses, brisk capillary refill. No clubbing, cyanosis, or edema  NERVOUS SYSTEM:  intubated and sedated  SKIN: No rashes or lesions    LINES:     HOSPITAL MEDICATIONS:  MEDICATIONS  (STANDING):  atorvastatin 80 milliGRAM(s) Oral at bedtime  dextrose 5%. 1000 milliLiter(s) (50 mL/Hr) IV Continuous <Continuous>  dextrose 5%. 1000 milliLiter(s) (100 mL/Hr) IV Continuous <Continuous>  dextrose 50% Injectable 25 Gram(s) IV Push once  dextrose 50% Injectable 12.5 Gram(s) IV Push once  dextrose 50% Injectable 25 Gram(s) IV Push once  glucagon  Injectable 1 milliGRAM(s) IntraMuscular once  insulin glargine Injectable (LANTUS) 4 Unit(s) SubCutaneous <User Schedule>  insulin lispro (ADMELOG) corrective regimen sliding scale   SubCutaneous every 6 hours  pantoprazole Infusion 8 mG/Hr (10 mL/Hr) IV Continuous <Continuous>  potassium phosphate / sodium phosphate Powder (PHOS-NaK) 1 Packet(s) Oral once  sodium chloride 0.9% Bolus 500 milliLiter(s) IV Bolus once  sodium chloride 0.9%. 1000 milliLiter(s) (75 mL/Hr) IV Continuous <Continuous>    MEDICATIONS  (PRN):  dextrose Oral Gel 15 Gram(s) Oral once PRN Blood Glucose LESS THAN 70 milliGRAM(s)/deciliter  ondansetron Injectable 4 milliGRAM(s) IV Push every 8 hours PRN Nausea and/or Vomiting  oxycodone    5 mG/acetaminophen 325 mG 1 Tablet(s) Oral every 8 hours PRN Severe Pain (7 - 10)      LABS:                        5.3    15.78 )-----------( 132      ( 19 Jul 2022 04:44 )             15.7     Hgb Trend: 5.3<--, 5.6<--, 8.7<--, 7.1<--, 7.8<--  07-19    145  |  113<H>  |  120<H>  ----------------------------<  163<H>  4.6   |  10<LL>  |  2.58<H>    Ca    8.5      19 Jul 2022 04:44  Phos  5.2     07-19  Mg     2.10     07-19    TPro  4.4<L>  /  Alb  2.9<L>  /  TBili  0.7  /  DBili  x   /  AST  37  /  ALT  44<H>  /  AlkPhos  30<L>  07-19    Creatinine Trend: 2.58<--, 2.43<--, 1.88<--, 1.79<--, 2.53<--  PT/INR - ( 19 Jul 2022 04:44 )   PT: 16.4 sec;   INR: 1.41 ratio         PTT - ( 19 Jul 2022 04:44 )  PTT:24.9 sec          MICROBIOLOGY:     RADIOLOGY & ADDITIONAL TESTS:      ASSESSMENT AND PLAN:  75 y/o M with PMH of T2DM, HTN, HLD, severe peripheral vascular disease, status post left right femoral-femoral bypass with right femoral-popliteal bypass graft in the past, history of femur fracture requiring multiple operations to fix the femur in 1990s, former smoker who quit in 1996 presenting with two episodes of hematemesis. MICU reconsulted for hypotension and Hgb 5.9  2/2 likely UGIB. S/p cardiac arrest on 7/19 prior to MICU transfer w/ ROSC.     #Neuro  - baseline A&Ox3  - noted to wake up purposefully following cardiac arrest  - continue propofol for sedation, wean as tolerated when stable to assess neurological function following cardiac arrest.    #Cardiovascular  PEA arrest likely 2/2 hemorrhagic shock  - 7/19- Prior to transfer to MICU, pt became hypotensive SBP~30s, then subsequently went into cardiac arrest. Code blue was called, pt found to be in PEA, received 4 epi's, 2 amps of bicarb and ROSC was achieved after approximately ~12.5 minutes.  - maxxed on levo, amber, vaso  - s/p 4 uPRBC, plan for 2 additional  - s/p 1L LR and started on bicarb gtt @100cc/hr for acidosis pH 6.9  - EKG NSR w/ RBBB TWI in aVR, aVL  -     #Respiratory  intubated s/p cardiac arrest 7/19  - vent settings 23/ / 5/50%  -     #GI/Nutrition  GIB, hemorrhagic shock  - presented w/ coffee ground emesis    #/Renal    #Skin    #ID    #Endocrine    #Hematologic/DVT ppx    #Ethics      Sal Boles MD MICU Accept Note    CHIEF COMPLAINT: UGIB    HPI / INTERVAL HISTORY:    77 y/o M with PMH of T2DM, HTN, HLD, severe peripheral vascular disease, status post left right femoral-femoral bypass with right femoral-popliteal bypass graft in the past, history of femur fracture requiring multiple operations to fix the femur in 1990s, former smoker who quit in 1996 presenting with two episodes of hematemesis. Pt reports this has never happened before, first episode was Friday night, then on Saturday morning. Vomitus was coffee ground, sometimes had bright red blood, was approximately 1 cup in volume per pt. Had a EGD/colonoscopy approximately 1 year ago, reports no abnormalities as far as he can remember. Reports no abdominal pain, no diarrhea, no bloody stool, no hemoptysis no hematochezia, melena, dysuria, hematuria. Denied fever, chills, CP, SOB, nausea, vomiting.     MICU reconsulted for hypotension to SBP 80s, Hgb 5.6,  c/f UGIB. Plan for EGD later today (7/19) with GI. Repeat Hgb was 5.3, ordered for bleeding scan to evaluate for source of GI bleed. Pt was also ordered for 2u PRBC. Pt accepted to MICU for close monitoring.  Prior to transfer to MICU, pt became hypotensive SBP~30s, then subsequently went into cardiac arrest. Code blue was called, pt found to be in PEA, received 4 epi's, 2 amps of bicarb and ROSC was achieved after approximately ~12.5 minutes.  Post arrest 1 additional u PRBC ordered. He was started propofol for sedation. Required pressor support w/ levo, amber gtt maxed. Also received 1 addition amp bicarb, fent 50x2.    ABG obtained which showed pH 6.9, pCO2 25, pO2 204, lactate 7.3. Of note observed to have woken up following arrest.  Upon transfer to MICU pt remained hypotensive ~SBP 50, vaso gtt was added. He also received additional bicarb pushes and started on bicarb gtt for pH 6.9.   POCUS showed collapsed IVC, preserved LV function. 1L LR bolus was ordered. OGT placed to suction was noted w/ bright red blood.   After receiving total of 4uPRBC Hb improved to 8.6.       PAST MEDICAL & SURGICAL HISTORY:  Hypertension      Diabetes mellitus      Glaucoma      Fracture of femur  Had surgery (1997 )      Hyperlipidemia      S/P appendectomy      S/P femoral-popliteal bypass surgery      History of inferior vena caval filter placement  Mark Filter      Femoral fracture  Right &amp; had surgery ( 1997 )      Advance Directives:     HOME MEDICATIONS:      Allergies    No Known Allergies    Intolerances      OBJECTIVE:  ICU Vital Signs Last 24 Hrs  T(C): 36.4 (19 Jul 2022 05:25), Max: 36.8 (18 Jul 2022 09:25)  T(F): 97.5 (19 Jul 2022 05:25), Max: 98.2 (18 Jul 2022 09:25)  HR: 104 (19 Jul 2022 05:25) (90 - 115)  BP: 93/41 (19 Jul 2022 05:25) (83/61 - 136/68)  BP(mean): --  ABP: --  ABP(mean): --  RR: 19 (19 Jul 2022 05:25) (16 - 20)  SpO2: 100% (19 Jul 2022 05:25) (95% - 100%)    O2 Parameters below as of 19 Jul 2022 05:25  Patient On (Oxygen Delivery Method): nasal cannula,2        07-18 @ 07:01  -  07-19 @ 05:26  --------------------------------------------------------  IN: 0 mL / OUT: 250 mL / NET: -250 mL      CAPILLARY BLOOD GLUCOSE      POCT Blood Glucose.: 214 mg/dL (19 Jul 2022 01:51)      PHYSICAL EXAM:  GENERAL: NAD, sedated  HEAD:  Atraumatic, Normocephalic  EYES: EOMI, PERRLA, conjunctiva and sclera clear  ENT: Moist mucous membranes  NECK: Supple, No JVD  CHEST/LUNG: ETT in place, on mechanical ventialtion. Clear to auscultation bilaterally; No rales, rhonchi, wheezing, or rubs.   HEART: Regular rate and rhythm; No murmurs, rubs, or gallops  ABDOMEN: BSx4; Soft, nontender, nondistended  EXTREMITIES:  2+ Peripheral Pulses, brisk capillary refill. No clubbing, cyanosis, or edema  NERVOUS SYSTEM:  intubated and sedated  SKIN: No rashes or lesions    LINES:     HOSPITAL MEDICATIONS:  MEDICATIONS  (STANDING):  atorvastatin 80 milliGRAM(s) Oral at bedtime  dextrose 5%. 1000 milliLiter(s) (50 mL/Hr) IV Continuous <Continuous>  dextrose 5%. 1000 milliLiter(s) (100 mL/Hr) IV Continuous <Continuous>  dextrose 50% Injectable 25 Gram(s) IV Push once  dextrose 50% Injectable 12.5 Gram(s) IV Push once  dextrose 50% Injectable 25 Gram(s) IV Push once  glucagon  Injectable 1 milliGRAM(s) IntraMuscular once  insulin glargine Injectable (LANTUS) 4 Unit(s) SubCutaneous <User Schedule>  insulin lispro (ADMELOG) corrective regimen sliding scale   SubCutaneous every 6 hours  pantoprazole Infusion 8 mG/Hr (10 mL/Hr) IV Continuous <Continuous>  potassium phosphate / sodium phosphate Powder (PHOS-NaK) 1 Packet(s) Oral once  sodium chloride 0.9% Bolus 500 milliLiter(s) IV Bolus once  sodium chloride 0.9%. 1000 milliLiter(s) (75 mL/Hr) IV Continuous <Continuous>    MEDICATIONS  (PRN):  dextrose Oral Gel 15 Gram(s) Oral once PRN Blood Glucose LESS THAN 70 milliGRAM(s)/deciliter  ondansetron Injectable 4 milliGRAM(s) IV Push every 8 hours PRN Nausea and/or Vomiting  oxycodone    5 mG/acetaminophen 325 mG 1 Tablet(s) Oral every 8 hours PRN Severe Pain (7 - 10)      LABS:                        5.3    15.78 )-----------( 132      ( 19 Jul 2022 04:44 )             15.7     Hgb Trend: 5.3<--, 5.6<--, 8.7<--, 7.1<--, 7.8<--  07-19    145  |  113<H>  |  120<H>  ----------------------------<  163<H>  4.6   |  10<LL>  |  2.58<H>    Ca    8.5      19 Jul 2022 04:44  Phos  5.2     07-19  Mg     2.10     07-19    TPro  4.4<L>  /  Alb  2.9<L>  /  TBili  0.7  /  DBili  x   /  AST  37  /  ALT  44<H>  /  AlkPhos  30<L>  07-19    Creatinine Trend: 2.58<--, 2.43<--, 1.88<--, 1.79<--, 2.53<--  PT/INR - ( 19 Jul 2022 04:44 )   PT: 16.4 sec;   INR: 1.41 ratio         PTT - ( 19 Jul 2022 04:44 )  PTT:24.9 sec      RADIOLOGY & ADDITIONAL TESTS:      ASSESSMENT AND PLAN:  77 y/o M with PMH of T2DM, HTN, HLD, severe peripheral vascular disease, status post left right femoral-femoral bypass with right femoral-popliteal bypass graft in the past, history of femur fracture requiring multiple operations to fix the femur in 1990s, former smoker who quit in 1996 presenting with two episodes of hematemesis. MICU reconsulted for hypotension and Hgb 5.9  2/2 likely UGIB. S/p cardiac arrest on 7/19 prior to MICU transfer w/ ROSC.     #Neuro  - baseline A&Ox3  - noted to wake up purposefully following cardiac arrest  - continue propofol for sedation, wean as tolerated when stable to assess neurological function following cardiac arrest.    #Cardiovascular  PEA arrest likely 2/2 hemorrhagic shock  - 7/19- Prior to transfer to MICU, pt became hypotensive SBP~30s, then subsequently went into cardiac arrest. Code blue was called, pt found to be in PEA, received 4 epi's, 2 amps of bicarb and ROSC was achieved after approximately ~12.5 minutes.  - maxxed on levo, amber, vaso  - s/p 4 uPRBC, plan for 2 additional  - s/p 1L LR and started on bicarb gtt @100cc/hr for acidosis pH 6.9  - EKG NSR w/ RBBB TWI in aVR, aVL  -     #Respiratory  intubated s/p cardiac arrest 7/19  - vent settings 23/ / 5/50%      #GI/Nutrition  GIB, hemorrhagic shock  - presented w/ coffee ground emesis  - s/p 5 uPRBC today  - OGT placed, ~200cc bright red blood noted when placed to wall suction, GI called for EGD.     #/Renal    #Skin    #ID    #Endocrine    #Hematologic/DVT ppx  - Hb 7.9 on admission, noted to be 5.3 on 7/19  - SCDs in setting of GIB    #Ethics      Sal Boles MD MICU Accept Note    CHIEF COMPLAINT: UGIB    HPI / INTERVAL HISTORY:    77 y/o M with PMH of T2DM, HTN, HLD, severe peripheral vascular disease, status post left right femoral-femoral bypass with right femoral-popliteal bypass graft in the past, history of femur fracture requiring multiple operations to fix the femur in 1990s, former smoker who quit in 1996 presenting with two episodes of hematemesis. Pt reports this has never happened before, first episode was Friday night, then on Saturday morning. Vomitus was coffee ground, sometimes had bright red blood, was approximately 1 cup in volume per pt. Had a EGD/colonoscopy approximately 1 year ago, reports no abnormalities as far as he can remember. Reports no abdominal pain, no diarrhea, no bloody stool, no hemoptysis no hematochezia, melena, dysuria, hematuria. Denied fever, chills, CP, SOB, nausea, vomiting.     MICU reconsulted for hypotension to SBP 80s, Hgb 5.6,  c/f UGIB. Plan for EGD later today (7/19) with GI. Repeat Hgb was 5.3, ordered for bleeding scan to evaluate for source of GI bleed. Pt was also ordered for 2u PRBC. Pt accepted to MICU for close monitoring.  Prior to transfer to MICU, pt became hypotensive SBP~30s, then subsequently went into cardiac arrest. Code blue was called, pt found to be in PEA, received 4 epi's, 2 amps of bicarb and ROSC was achieved after approximately ~12.5 minutes.  Post arrest 1 additional u PRBC ordered. He was started propofol for sedation. Required pressor support w/ levo, amber gtt maxed. Also received 1 addition amp bicarb, fent 50x2.    ABG obtained which showed pH 6.9, pCO2 25, pO2 204, lactate 7.3. Of note observed to have woken up following arrest.  Upon transfer to MICU pt remained hypotensive ~SBP 50, vaso gtt was added. He also received additional bicarb pushes and started on bicarb gtt for pH 6.9.   POCUS showed collapsed IVC, preserved LV function. 1L LR bolus was ordered. OGT placed to suction was noted w/ bright red blood.   After receiving total of 4uPRBC Hb improved to 8.6.       PAST MEDICAL & SURGICAL HISTORY:  Hypertension      Diabetes mellitus      Glaucoma      Fracture of femur  Had surgery (1997 )      Hyperlipidemia      S/P appendectomy      S/P femoral-popliteal bypass surgery      History of inferior vena caval filter placement  Mark Filter      Femoral fracture  Right &amp; had surgery ( 1997 )      Advance Directives:     HOME MEDICATIONS:      Allergies    No Known Allergies    Intolerances      OBJECTIVE:  ICU Vital Signs Last 24 Hrs  T(C): 36.4 (19 Jul 2022 05:25), Max: 36.8 (18 Jul 2022 09:25)  T(F): 97.5 (19 Jul 2022 05:25), Max: 98.2 (18 Jul 2022 09:25)  HR: 104 (19 Jul 2022 05:25) (90 - 115)  BP: 93/41 (19 Jul 2022 05:25) (83/61 - 136/68)  BP(mean): --  ABP: --  ABP(mean): --  RR: 19 (19 Jul 2022 05:25) (16 - 20)  SpO2: 100% (19 Jul 2022 05:25) (95% - 100%)    O2 Parameters below as of 19 Jul 2022 05:25  Patient On (Oxygen Delivery Method): nasal cannula,2        07-18 @ 07:01  -  07-19 @ 05:26  --------------------------------------------------------  IN: 0 mL / OUT: 250 mL / NET: -250 mL      CAPILLARY BLOOD GLUCOSE      POCT Blood Glucose.: 214 mg/dL (19 Jul 2022 01:51)      PHYSICAL EXAM:  GENERAL: NAD, sedated  HEAD:  Atraumatic, Normocephalic  EYES: EOMI, PERRLA, conjunctiva and sclera clear  ENT: Moist mucous membranes  NECK: Supple, No JVD  CHEST/LUNG: ETT in place, on mechanical ventialtion. Clear to auscultation bilaterally; No rales, rhonchi, wheezing, or rubs.   HEART: Regular rate and rhythm; No murmurs, rubs, or gallops  ABDOMEN: BSx4; Soft, nontender, nondistended  EXTREMITIES:  2+ Peripheral Pulses, brisk capillary refill. No clubbing, cyanosis, or edema  NERVOUS SYSTEM:  intubated and sedated  SKIN: No rashes or lesions    LINES:     HOSPITAL MEDICATIONS:  MEDICATIONS  (STANDING):  atorvastatin 80 milliGRAM(s) Oral at bedtime  dextrose 5%. 1000 milliLiter(s) (50 mL/Hr) IV Continuous <Continuous>  dextrose 5%. 1000 milliLiter(s) (100 mL/Hr) IV Continuous <Continuous>  dextrose 50% Injectable 25 Gram(s) IV Push once  dextrose 50% Injectable 12.5 Gram(s) IV Push once  dextrose 50% Injectable 25 Gram(s) IV Push once  glucagon  Injectable 1 milliGRAM(s) IntraMuscular once  insulin glargine Injectable (LANTUS) 4 Unit(s) SubCutaneous <User Schedule>  insulin lispro (ADMELOG) corrective regimen sliding scale   SubCutaneous every 6 hours  pantoprazole Infusion 8 mG/Hr (10 mL/Hr) IV Continuous <Continuous>  potassium phosphate / sodium phosphate Powder (PHOS-NaK) 1 Packet(s) Oral once  sodium chloride 0.9% Bolus 500 milliLiter(s) IV Bolus once  sodium chloride 0.9%. 1000 milliLiter(s) (75 mL/Hr) IV Continuous <Continuous>    MEDICATIONS  (PRN):  dextrose Oral Gel 15 Gram(s) Oral once PRN Blood Glucose LESS THAN 70 milliGRAM(s)/deciliter  ondansetron Injectable 4 milliGRAM(s) IV Push every 8 hours PRN Nausea and/or Vomiting  oxycodone    5 mG/acetaminophen 325 mG 1 Tablet(s) Oral every 8 hours PRN Severe Pain (7 - 10)      LABS:                        5.3    15.78 )-----------( 132      ( 19 Jul 2022 04:44 )             15.7     Hgb Trend: 5.3<--, 5.6<--, 8.7<--, 7.1<--, 7.8<--  07-19    145  |  113<H>  |  120<H>  ----------------------------<  163<H>  4.6   |  10<LL>  |  2.58<H>    Ca    8.5      19 Jul 2022 04:44  Phos  5.2     07-19  Mg     2.10     07-19    TPro  4.4<L>  /  Alb  2.9<L>  /  TBili  0.7  /  DBili  x   /  AST  37  /  ALT  44<H>  /  AlkPhos  30<L>  07-19    Creatinine Trend: 2.58<--, 2.43<--, 1.88<--, 1.79<--, 2.53<--  PT/INR - ( 19 Jul 2022 04:44 )   PT: 16.4 sec;   INR: 1.41 ratio         PTT - ( 19 Jul 2022 04:44 )  PTT:24.9 sec      RADIOLOGY & ADDITIONAL TESTS:      ASSESSMENT AND PLAN:  77 y/o M with PMH of T2DM, HTN, HLD, severe peripheral vascular disease, status post left right femoral-femoral bypass with right femoral-popliteal bypass graft in the past, history of femur fracture requiring multiple operations to fix the femur in 1990s, former smoker who quit in 1996 presenting with two episodes of hematemesis. MICU reconsulted for hypotension and Hgb 5.9  2/2 likely UGIB. S/p cardiac arrest on 7/19 prior to MICU transfer w/ ROSC.     #Neuro  - baseline A&Ox3  - noted to wake up purposefully following cardiac arrest  - continue propofol for sedation, wean as tolerated when stable to assess neurological function following cardiac arrest.    #Cardiovascular  PEA arrest likely 2/2 hemorrhagic shock  - 7/19- Prior to transfer to MICU, pt became hypotensive SBP~30s, then subsequently went into cardiac arrest. Code blue was called, pt found to be in PEA, received 4 epi's, 2 amps of bicarb and ROSC was achieved after approximately ~12.5 minutes.  - maxxed on levo, amber, vaso  - s/p 4 uPRBC, plan for 2 additional  - s/p 1L LR and started on bicarb gtt @100cc/hr for acidosis pH 6.9  - EKG NSR w/ RBBB TWI in aVR, aVL. check trops.   -     #Respiratory  intubated s/p cardiac arrest 7/19  - vent settings 23/ / 5/50%      #GI/Nutrition  GIB, hemorrhagic shock  - presented w/ coffee ground emesis  - s/p 5 uPRBC today  - OGT placed, ~200cc bright red blood noted when placed to wall suction, GI called for EGD.     #/Renal    #Skin    #ID    #Endocrine    #Hematologic/DVT ppx  - Hb 7.9 on admission, noted to be 5.3 on 7/19  - SCDs in setting of GIB    #Ethics      Sal Boles MD MICU Accept Note    CHIEF COMPLAINT: UGIB    HPI / INTERVAL HISTORY:    75 y/o M with PMH of T2DM, HTN, HLD, severe peripheral vascular disease, status post left right femoral-femoral bypass with right femoral-popliteal bypass graft in the past, history of femur fracture requiring multiple operations to fix the femur in 1990s, former smoker who quit in 1996 presenting with two episodes of hematemesis. Pt reports this has never happened before, first episode was Friday night, then on Saturday morning. Vomitus was coffee ground, sometimes had bright red blood, was approximately 1 cup in volume per pt. Had a EGD/colonoscopy approximately 1 year ago, reports no abnormalities as far as he can remember. Reports no abdominal pain, no diarrhea, no bloody stool, no hemoptysis no hematochezia, melena, dysuria, hematuria. Denied fever, chills, CP, SOB, nausea, vomiting.     MICU reconsulted for hypotension to SBP 80s, Hgb 5.6,  c/f UGIB. Plan for EGD later today (7/19) with GI. Repeat Hgb was 5.3, ordered for bleeding scan to evaluate for source of GI bleed. Pt was also ordered for 2u PRBC. Pt accepted to MICU for close monitoring.  Prior to transfer to MICU, pt became hypotensive SBP~30s, then subsequently went into cardiac arrest. Code blue was called, pt found to be in PEA, received 4 epi's, 2 amps of bicarb and ROSC was achieved after approximately ~12.5 minutes.  Post arrest 1 additional u PRBC ordered. He was started propofol for sedation. Required pressor support w/ levo, amber gtt maxed. Also received 1 addition amp bicarb, fent 50x2.    ABG obtained which showed pH 6.9, pCO2 25, pO2 204, lactate 7.3. Of note observed to have woken up following arrest.  Upon transfer to MICU pt remained hypotensive ~SBP 50, vaso gtt was added. He also received additional bicarb pushes and started on bicarb gtt for pH 6.9.   POCUS showed collapsed IVC, preserved LV function. 1L LR bolus was ordered. OGT placed to suction was noted w/ bright red blood.   After receiving total of 4uPRBC Hb improved to 8.6.       PAST MEDICAL & SURGICAL HISTORY:  Hypertension      Diabetes mellitus      Glaucoma      Fracture of femur  Had surgery (1997 )      Hyperlipidemia      S/P appendectomy      S/P femoral-popliteal bypass surgery      History of inferior vena caval filter placement  Mark Filter      Femoral fracture  Right &amp; had surgery ( 1997 )      Advance Directives:     HOME MEDICATIONS:      Allergies    No Known Allergies    Intolerances      OBJECTIVE:  ICU Vital Signs Last 24 Hrs  T(C): 36.4 (19 Jul 2022 05:25), Max: 36.8 (18 Jul 2022 09:25)  T(F): 97.5 (19 Jul 2022 05:25), Max: 98.2 (18 Jul 2022 09:25)  HR: 104 (19 Jul 2022 05:25) (90 - 115)  BP: 93/41 (19 Jul 2022 05:25) (83/61 - 136/68)  BP(mean): --  ABP: --  ABP(mean): --  RR: 19 (19 Jul 2022 05:25) (16 - 20)  SpO2: 100% (19 Jul 2022 05:25) (95% - 100%)    O2 Parameters below as of 19 Jul 2022 05:25  Patient On (Oxygen Delivery Method): nasal cannula,2        07-18 @ 07:01  -  07-19 @ 05:26  --------------------------------------------------------  IN: 0 mL / OUT: 250 mL / NET: -250 mL      CAPILLARY BLOOD GLUCOSE      POCT Blood Glucose.: 214 mg/dL (19 Jul 2022 01:51)      PHYSICAL EXAM:  GENERAL: NAD, sedated  HEAD:  Atraumatic, Normocephalic  EYES: EOMI, PERRLA, conjunctiva and sclera clear  ENT: Moist mucous membranes  NECK: Supple, No JVD  CHEST/LUNG: ETT in place, on mechanical ventialtion. Clear to auscultation bilaterally; No rales, rhonchi, wheezing, or rubs.   HEART: Regular rate and rhythm; No murmurs, rubs, or gallops  ABDOMEN: BSx4; Soft, nontender, nondistended  EXTREMITIES:  2+ Peripheral Pulses, brisk capillary refill. No clubbing, cyanosis, or edema  NERVOUS SYSTEM:  intubated and sedated  SKIN: No rashes or lesions    LINES:     HOSPITAL MEDICATIONS:  MEDICATIONS  (STANDING):  atorvastatin 80 milliGRAM(s) Oral at bedtime  dextrose 5%. 1000 milliLiter(s) (50 mL/Hr) IV Continuous <Continuous>  dextrose 5%. 1000 milliLiter(s) (100 mL/Hr) IV Continuous <Continuous>  dextrose 50% Injectable 25 Gram(s) IV Push once  dextrose 50% Injectable 12.5 Gram(s) IV Push once  dextrose 50% Injectable 25 Gram(s) IV Push once  glucagon  Injectable 1 milliGRAM(s) IntraMuscular once  insulin glargine Injectable (LANTUS) 4 Unit(s) SubCutaneous <User Schedule>  insulin lispro (ADMELOG) corrective regimen sliding scale   SubCutaneous every 6 hours  pantoprazole Infusion 8 mG/Hr (10 mL/Hr) IV Continuous <Continuous>  potassium phosphate / sodium phosphate Powder (PHOS-NaK) 1 Packet(s) Oral once  sodium chloride 0.9% Bolus 500 milliLiter(s) IV Bolus once  sodium chloride 0.9%. 1000 milliLiter(s) (75 mL/Hr) IV Continuous <Continuous>    MEDICATIONS  (PRN):  dextrose Oral Gel 15 Gram(s) Oral once PRN Blood Glucose LESS THAN 70 milliGRAM(s)/deciliter  ondansetron Injectable 4 milliGRAM(s) IV Push every 8 hours PRN Nausea and/or Vomiting  oxycodone    5 mG/acetaminophen 325 mG 1 Tablet(s) Oral every 8 hours PRN Severe Pain (7 - 10)      LABS:                        5.3    15.78 )-----------( 132      ( 19 Jul 2022 04:44 )             15.7     Hgb Trend: 5.3<--, 5.6<--, 8.7<--, 7.1<--, 7.8<--  07-19    145  |  113<H>  |  120<H>  ----------------------------<  163<H>  4.6   |  10<LL>  |  2.58<H>    Ca    8.5      19 Jul 2022 04:44  Phos  5.2     07-19  Mg     2.10     07-19    TPro  4.4<L>  /  Alb  2.9<L>  /  TBili  0.7  /  DBili  x   /  AST  37  /  ALT  44<H>  /  AlkPhos  30<L>  07-19    Creatinine Trend: 2.58<--, 2.43<--, 1.88<--, 1.79<--, 2.53<--  PT/INR - ( 19 Jul 2022 04:44 )   PT: 16.4 sec;   INR: 1.41 ratio         PTT - ( 19 Jul 2022 04:44 )  PTT:24.9 sec      RADIOLOGY & ADDITIONAL TESTS:      ASSESSMENT AND PLAN:  75 y/o M with PMH of T2DM, HTN, HLD, severe peripheral vascular disease, status post left right femoral-femoral bypass with right femoral-popliteal bypass graft in the past, history of femur fracture requiring multiple operations to fix the femur in 1990s, former smoker who quit in 1996 presenting with two episodes of hematemesis. MICU reconsulted for hypotension and Hgb 5.9  2/2 likely UGIB. S/p cardiac arrest on 7/19 prior to MICU transfer w/ ROSC.     #Neuro  - baseline A&Ox3  - noted to wake up purposefully following cardiac arrest  - continue propofol for sedation, wean as tolerated when stable to assess neurological function following cardiac arrest.    #Cardiovascular  PEA arrest likely 2/2 hemorrhagic shock in setting of GIB  - 7/19- Prior to transfer to MICU, pt became hypotensive SBP~30s, then subsequently went into cardiac arrest. Code blue was called, pt found to be in PEA, received 4 epi's, 2 amps of bicarb and ROSC was achieved after approximately ~12.5 minutes.  - maxxed on levo, amber, vaso  - s/p 5 uPRBC today, HB 5.3->8.6, cont to monitor CBC closely and tranfuse PRN for Hb >7  - s/p 1L LR and started on bicarb gtt @100cc/hr for acidosis pH 6.9  - EKG NSR w/ RBBB TWI in aVR, aVL. monitor trops.   - ECHO 7/18: 1. Systolic anterior motion of the mitral valve. 2. Reversal of the E-A  waves of the mitral inflow pattern is consistent with diastolic LV dysfunction. 3. Normal right ventricular size and function. There is a significant LVOT gradient. Doppler envelopes overlap with mitral signals making accurate gradients difficult to obtain. Grossly, there is at least mild to moderate obstruction with a peak gradient of 26 mm Hg and mean 12 mm Hg. These are likely underestimations.  - POCUS: Grossly normal LVSF. LV > RV. Hypertrophied interventricular septum with systolic anterior motion of the mitral valve. No pericardial effusion. IVC 0.6 cm with respiratory variation.     #Respiratory  intubated for airway protection s/p cardiac arrest 7/19  - vent settings 23/500/5/50%  - ABG - ( 19 Jul 2022 14:50 ) pH, Arterial: 6.90  pH, Blood: x     /  pCO2: 30    /  pO2: 195   / HCO3: 6     / Base Excess: -25.4 /  SaO2: 100.0   - CXR ordered for ETT placement confirmation    #GI/Nutrition  Upper GIB, hemorraghic shock   - presented w/ coffee ground emesis on 7/16, states had EGD/colonoscopy in 2015 which was normal  - s/p 5 uPRBC today, received total of 4u PRBC on medical floors  - OGT placed upon arrival to MICU, ~200cc bright red blood noted when placed to wall suction, GI called for EGD now that pt has stabilized to evaluate for active bleed.  - cont protonix gtt  - monitor CBC Q6hrs, transfuse PRN to maintain HB >6    #/Renal  MELECIO , likely multifactorial in settin gof hypotension/GIB/anemia  - sCr 2.53 on admission. Unknown baseline, sCr 1.3 in 2015. Now uptrending ,  3.76 post arrest   -   unstable for HD at this time  Daily BMP    Metabolic acidosis  Due to lactate  management of shock per MICU team  if AGA not from Lactate or NAGMA might benefit from HD for acidosis    GIB  Per GI recs    Poor prognosis  Unable to reach famil    #Skin    #ID    #Endocrine    #Hematologic/DVT ppx  - Hb 7.9 on admission, noted to be 5.3 on 7/19  - SCDs in setting of GIB    #Ethics  FULL CODE    Lines:  R Axillary A- line (7/19- )  L IJ CVC ( 7/19-)  peripherals       Sal Boles MD MICU Accept Note    CHIEF COMPLAINT: UGIB    HPI / INTERVAL HISTORY:    75 y/o M with PMH of T2DM, HTN, HLD, severe peripheral vascular disease, status post left right femoral-femoral bypass with right femoral-popliteal bypass graft in the past, history of femur fracture requiring multiple operations to fix the femur in 1990s, former smoker who quit in 1996 presenting with two episodes of hematemesis. Pt reports this has never happened before, first episode was Friday night, then on Saturday morning. Vomitus was coffee ground, sometimes had bright red blood, was approximately 1 cup in volume per pt. Had a EGD/colonoscopy approximately 1 year ago, reports no abnormalities as far as he can remember. Reports no abdominal pain, no diarrhea, no bloody stool, no hemoptysis no hematochezia, melena, dysuria, hematuria. Denied fever, chills, CP, SOB, nausea, vomiting.     MICU reconsulted for hypotension to SBP 80s, Hgb 5.6,  c/f UGIB. Plan for EGD later today (7/19) with GI. Repeat Hgb was 5.3, ordered for bleeding scan to evaluate for source of GI bleed. Pt was also ordered for 2u PRBC. Pt accepted to MICU for close monitoring.  Prior to transfer to MICU, pt became hypotensive SBP~30s, then subsequently went into cardiac arrest. Code blue was called, pt found to be in PEA, received 4 epi's, 2 amps of bicarb and ROSC was achieved after approximately ~12.5 minutes.  Post arrest 1 additional u PRBC ordered. He was started propofol for sedation. Required pressor support w/ levo, amber gtt maxed. Also received 1 addition amp bicarb, fent 50x2.    ABG obtained which showed pH 6.9, pCO2 25, pO2 204, lactate 7.3. Of note observed to have woken up following arrest.  Upon transfer to MICU pt remained hypotensive ~SBP 50, vaso gtt was added. He also received additional bicarb pushes and started on bicarb gtt for pH 6.9.   POCUS showed collapsed IVC, preserved LV function. 1L LR bolus was ordered. OGT placed to suction was noted w/ bright red blood.   After receiving total of 4uPRBC Hb improved to 8.6.       PAST MEDICAL & SURGICAL HISTORY:  Hypertension      Diabetes mellitus      Glaucoma      Fracture of femur  Had surgery (1997 )      Hyperlipidemia      S/P appendectomy      S/P femoral-popliteal bypass surgery      History of inferior vena caval filter placement  Mark Filter      Femoral fracture  Right &amp; had surgery ( 1997 )      Advance Directives:     HOME MEDICATIONS:      Allergies    No Known Allergies    Intolerances      OBJECTIVE:  ICU Vital Signs Last 24 Hrs  T(C): 36.4 (19 Jul 2022 05:25), Max: 36.8 (18 Jul 2022 09:25)  T(F): 97.5 (19 Jul 2022 05:25), Max: 98.2 (18 Jul 2022 09:25)  HR: 104 (19 Jul 2022 05:25) (90 - 115)  BP: 93/41 (19 Jul 2022 05:25) (83/61 - 136/68)  BP(mean): --  ABP: --  ABP(mean): --  RR: 19 (19 Jul 2022 05:25) (16 - 20)  SpO2: 100% (19 Jul 2022 05:25) (95% - 100%)    O2 Parameters below as of 19 Jul 2022 05:25  Patient On (Oxygen Delivery Method): nasal cannula,2        07-18 @ 07:01  -  07-19 @ 05:26  --------------------------------------------------------  IN: 0 mL / OUT: 250 mL / NET: -250 mL      CAPILLARY BLOOD GLUCOSE      POCT Blood Glucose.: 214 mg/dL (19 Jul 2022 01:51)      PHYSICAL EXAM:  GENERAL: NAD, sedated  HEAD:  Atraumatic, Normocephalic  EYES: EOMI, PERRLA, conjunctiva and sclera clear  ENT: Moist mucous membranes  NECK: Supple, No JVD  CHEST/LUNG: ETT in place, on mechanical ventialtion. Clear to auscultation bilaterally; No rales, rhonchi, wheezing, or rubs.   HEART: Regular rate and rhythm; No murmurs, rubs, or gallops  ABDOMEN: BSx4; Soft, nontender, nondistended  EXTREMITIES:  2+ Peripheral Pulses, brisk capillary refill. No clubbing, cyanosis, or edema  NERVOUS SYSTEM:  intubated and sedated  SKIN: No rashes or lesions    LINES:     HOSPITAL MEDICATIONS:  MEDICATIONS  (STANDING):  atorvastatin 80 milliGRAM(s) Oral at bedtime  dextrose 5%. 1000 milliLiter(s) (50 mL/Hr) IV Continuous <Continuous>  dextrose 5%. 1000 milliLiter(s) (100 mL/Hr) IV Continuous <Continuous>  dextrose 50% Injectable 25 Gram(s) IV Push once  dextrose 50% Injectable 12.5 Gram(s) IV Push once  dextrose 50% Injectable 25 Gram(s) IV Push once  glucagon  Injectable 1 milliGRAM(s) IntraMuscular once  insulin glargine Injectable (LANTUS) 4 Unit(s) SubCutaneous <User Schedule>  insulin lispro (ADMELOG) corrective regimen sliding scale   SubCutaneous every 6 hours  pantoprazole Infusion 8 mG/Hr (10 mL/Hr) IV Continuous <Continuous>  potassium phosphate / sodium phosphate Powder (PHOS-NaK) 1 Packet(s) Oral once  sodium chloride 0.9% Bolus 500 milliLiter(s) IV Bolus once  sodium chloride 0.9%. 1000 milliLiter(s) (75 mL/Hr) IV Continuous <Continuous>    MEDICATIONS  (PRN):  dextrose Oral Gel 15 Gram(s) Oral once PRN Blood Glucose LESS THAN 70 milliGRAM(s)/deciliter  ondansetron Injectable 4 milliGRAM(s) IV Push every 8 hours PRN Nausea and/or Vomiting  oxycodone    5 mG/acetaminophen 325 mG 1 Tablet(s) Oral every 8 hours PRN Severe Pain (7 - 10)      LABS:                        5.3    15.78 )-----------( 132      ( 19 Jul 2022 04:44 )             15.7     Hgb Trend: 5.3<--, 5.6<--, 8.7<--, 7.1<--, 7.8<--  07-19    145  |  113<H>  |  120<H>  ----------------------------<  163<H>  4.6   |  10<LL>  |  2.58<H>    Ca    8.5      19 Jul 2022 04:44  Phos  5.2     07-19  Mg     2.10     07-19    TPro  4.4<L>  /  Alb  2.9<L>  /  TBili  0.7  /  DBili  x   /  AST  37  /  ALT  44<H>  /  AlkPhos  30<L>  07-19    Creatinine Trend: 2.58<--, 2.43<--, 1.88<--, 1.79<--, 2.53<--  PT/INR - ( 19 Jul 2022 04:44 )   PT: 16.4 sec;   INR: 1.41 ratio         PTT - ( 19 Jul 2022 04:44 )  PTT:24.9 sec      RADIOLOGY & ADDITIONAL TESTS:      ASSESSMENT AND PLAN:  75 y/o M with PMH of T2DM, HTN, HLD, severe peripheral vascular disease, status post left right femoral-femoral bypass with right femoral-popliteal bypass graft in the past, history of femur fracture requiring multiple operations to fix the femur in 1990s, former smoker who quit in 1996 presenting with two episodes of hematemesis. MICU reconsulted for hypotension and Hgb 5.9  2/2 likely UGIB. S/p cardiac arrest on 7/19 prior to MICU transfer w/ ROSC.     #Neuro  - baseline A&Ox3  - noted to wake up purposefully following cardiac arrest  - continue propofol for sedation, wean as tolerated when stable to assess neurological function following cardiac arrest.    #Cardiovascular  PEA arrest likely 2/2 hemorrhagic shock in setting of GIB  - 7/19- Prior to transfer to MICU, pt became hypotensive SBP~30s, then subsequently went into cardiac arrest. Code blue was called, pt found to be in PEA, received 4 epi's, 2 amps of bicarb and ROSC was achieved after approximately ~12.5 minutes.  - maxxed on levo, amber, vaso  - s/p 5 uPRBC today, HB 5.3->8.6, cont to monitor CBC closely and tranfuse PRN for Hb >7  - s/p 1L LR and started on bicarb gtt @100cc/hr for acidosis pH 6.9  - EKG NSR w/ RBBB TWI in aVR, aVL. monitor trops.   - ECHO 7/18: 1. Systolic anterior motion of the mitral valve. 2. Reversal of the E-A  waves of the mitral inflow pattern is consistent with diastolic LV dysfunction. 3. Normal right ventricular size and function. There is a significant LVOT gradient. Doppler envelopes overlap with mitral signals making accurate gradients difficult to obtain. Grossly, there is at least mild to moderate obstruction with a peak gradient of 26 mm Hg and mean 12 mm Hg. These are likely underestimations.  - POCUS: Grossly normal LVSF. LV > RV. Hypertrophied interventricular septum with systolic anterior motion of the mitral valve. No pericardial effusion. IVC 0.6 cm with respiratory variation.     #Respiratory  intubated for airway protection s/p cardiac arrest 7/19  - vent settings 23/500/5/50%  - ABG - ( 19 Jul 2022 14:50 ) pH, Arterial: 6.90  pH, Blood: x     /  pCO2: 30    /  pO2: 195   / HCO3: 6     / Base Excess: -25.4 /  SaO2: 100.0   - CXR ordered for ETT placement confirmation    #GI/Nutrition  Upper GIB, hemorraghic shock   - presented w/ coffee ground emesis on 7/16, states had EGD/colonoscopy in 2015 which was normal  - s/p 5 uPRBC today, received total of 4u PRBC on medical floors  - OGT placed upon arrival to MICU, ~200cc bright red blood noted when placed to wall suction, GI called for EGD now that pt has stabilized to evaluate for active bleed.  - cont protonix gtt  - monitor CBC Q6hrs, transfuse PRN to maintain HB >6    #/Renal  MELECIO , likely multifactorial in settin gof hypotension/GIB/anemia  - sCr 2.53 on admission. Unknown baseline, sCr 1.3 in 2015. Now uptrending ,  3.76 post arrest   - max dose on tripple pressors, however now now   unstable for HD at this time  Daily BMP    Metabolic acidosis  Due to lactate  management of shock per MICU team  if AGA not from Lactate or NAGMA might benefit from HD for acidosis    GIB  Per GI recs    Poor prognosis  Unable to reach famil    #Skin    #ID    #Endocrine    #Hematologic/DVT ppx  - Hb 7.9 on admission, noted to be 5.3 on 7/19  - SCDs in setting of GIB    #Ethics  FULL CODE    Lines:  R Axillary A- line (7/19- )  L IJ CVC ( 7/19-)  peripherals       Sal Boles MD MICU Accept Note    CHIEF COMPLAINT: UGIB    HPI / INTERVAL HISTORY:    75 y/o M with PMH of T2DM, HTN, HLD, severe peripheral vascular disease, status post left right femoral-femoral bypass with right femoral-popliteal bypass graft in the past, history of femur fracture requiring multiple operations to fix the femur in 1990s, former smoker who quit in 1996 presenting with two episodes of hematemesis. Pt reports this has never happened before, first episode was Friday night, then on Saturday morning. Vomitus was coffee ground, sometimes had bright red blood, was approximately 1 cup in volume per pt. Had a EGD/colonoscopy approximately 1 year ago, reports no abnormalities as far as he can remember. Reports no abdominal pain, no diarrhea, no bloody stool, no hemoptysis no hematochezia, melena, dysuria, hematuria. Denied fever, chills, CP, SOB, nausea, vomiting.     MICU reconsulted for hypotension to SBP 80s, Hgb 5.6,  c/f UGIB. Plan for EGD later today (7/19) with GI. Repeat Hgb was 5.3, ordered for bleeding scan to evaluate for source of GI bleed. Pt was also ordered for 2u PRBC. Pt accepted to MICU for close monitoring.  Prior to transfer to MICU, pt became hypotensive SBP~30s, then subsequently went into cardiac arrest. Code blue was called, pt found to be in PEA, received 4 epi's, 2 amps of bicarb and ROSC was achieved after approximately ~12.5 minutes.  Post arrest 1 additional u PRBC ordered. He was started propofol for sedation. Required pressor support w/ levo, amber gtt maxed. Also received 1 addition amp bicarb, fent 50x2.    ABG obtained which showed pH 6.9, pCO2 25, pO2 204, lactate 7.3. Of note observed to have woken up following arrest.  Upon transfer to MICU pt remained hypotensive ~SBP 50, vaso gtt was added. He also received additional bicarb pushes and started on bicarb gtt for pH 6.9.   POCUS showed collapsed IVC, preserved LV function. 1L LR bolus was ordered. OGT placed to suction was noted w/ bright red blood.   After receiving total of 4uPRBC Hb improved to 8.6.   GI came to bedside for EGD, active bleed noted in proximal duodenum, IR consulted emergently.      PAST MEDICAL & SURGICAL HISTORY:  Hypertension      Diabetes mellitus      Glaucoma      Fracture of femur  Had surgery (1997 )      Hyperlipidemia      S/P appendectomy      S/P femoral-popliteal bypass surgery      History of inferior vena caval filter placement  Mark Filter      Femoral fracture  Right &amp; had surgery ( 1997 )      Advance Directives:     HOME MEDICATIONS:      Allergies    No Known Allergies    Intolerances      OBJECTIVE:  ICU Vital Signs Last 24 Hrs  T(C): 36.4 (19 Jul 2022 05:25), Max: 36.8 (18 Jul 2022 09:25)  T(F): 97.5 (19 Jul 2022 05:25), Max: 98.2 (18 Jul 2022 09:25)  HR: 104 (19 Jul 2022 05:25) (90 - 115)  BP: 93/41 (19 Jul 2022 05:25) (83/61 - 136/68)  BP(mean): --  ABP: --  ABP(mean): --  RR: 19 (19 Jul 2022 05:25) (16 - 20)  SpO2: 100% (19 Jul 2022 05:25) (95% - 100%)    O2 Parameters below as of 19 Jul 2022 05:25  Patient On (Oxygen Delivery Method): nasal cannula,2        07-18 @ 07:01  -  07-19 @ 05:26  --------------------------------------------------------  IN: 0 mL / OUT: 250 mL / NET: -250 mL      CAPILLARY BLOOD GLUCOSE      POCT Blood Glucose.: 214 mg/dL (19 Jul 2022 01:51)      PHYSICAL EXAM:  GENERAL: NAD, sedated  HEAD:  Atraumatic, Normocephalic  EYES: EOMI, PERRLA, conjunctiva and sclera clear  ENT: Moist mucous membranes  NECK: Supple, No JVD  CHEST/LUNG: ETT in place, on mechanical ventialtion. Clear to auscultation bilaterally; No rales, rhonchi, wheezing, or rubs.   HEART: Regular rate and rhythm; No murmurs, rubs, or gallops  ABDOMEN: BSx4; Soft, nontender, nondistended  EXTREMITIES:  2+ Peripheral Pulses, brisk capillary refill. No clubbing, cyanosis, or edema  NERVOUS SYSTEM:  intubated and sedated  SKIN: No rashes or lesions    LINES:     HOSPITAL MEDICATIONS:  MEDICATIONS  (STANDING):  atorvastatin 80 milliGRAM(s) Oral at bedtime  dextrose 5%. 1000 milliLiter(s) (50 mL/Hr) IV Continuous <Continuous>  dextrose 5%. 1000 milliLiter(s) (100 mL/Hr) IV Continuous <Continuous>  dextrose 50% Injectable 25 Gram(s) IV Push once  dextrose 50% Injectable 12.5 Gram(s) IV Push once  dextrose 50% Injectable 25 Gram(s) IV Push once  glucagon  Injectable 1 milliGRAM(s) IntraMuscular once  insulin glargine Injectable (LANTUS) 4 Unit(s) SubCutaneous <User Schedule>  insulin lispro (ADMELOG) corrective regimen sliding scale   SubCutaneous every 6 hours  pantoprazole Infusion 8 mG/Hr (10 mL/Hr) IV Continuous <Continuous>  potassium phosphate / sodium phosphate Powder (PHOS-NaK) 1 Packet(s) Oral once  sodium chloride 0.9% Bolus 500 milliLiter(s) IV Bolus once  sodium chloride 0.9%. 1000 milliLiter(s) (75 mL/Hr) IV Continuous <Continuous>    MEDICATIONS  (PRN):  dextrose Oral Gel 15 Gram(s) Oral once PRN Blood Glucose LESS THAN 70 milliGRAM(s)/deciliter  ondansetron Injectable 4 milliGRAM(s) IV Push every 8 hours PRN Nausea and/or Vomiting  oxycodone    5 mG/acetaminophen 325 mG 1 Tablet(s) Oral every 8 hours PRN Severe Pain (7 - 10)      LABS:                        5.3    15.78 )-----------( 132      ( 19 Jul 2022 04:44 )             15.7     Hgb Trend: 5.3<--, 5.6<--, 8.7<--, 7.1<--, 7.8<--  07-19    145  |  113<H>  |  120<H>  ----------------------------<  163<H>  4.6   |  10<LL>  |  2.58<H>    Ca    8.5      19 Jul 2022 04:44  Phos  5.2     07-19  Mg     2.10     07-19    TPro  4.4<L>  /  Alb  2.9<L>  /  TBili  0.7  /  DBili  x   /  AST  37  /  ALT  44<H>  /  AlkPhos  30<L>  07-19    Creatinine Trend: 2.58<--, 2.43<--, 1.88<--, 1.79<--, 2.53<--  PT/INR - ( 19 Jul 2022 04:44 )   PT: 16.4 sec;   INR: 1.41 ratio         PTT - ( 19 Jul 2022 04:44 )  PTT:24.9 sec      RADIOLOGY & ADDITIONAL TESTS:      ASSESSMENT AND PLAN:  75 y/o M with PMH of T2DM, HTN, HLD, severe peripheral vascular disease, status post left right femoral-femoral bypass with right femoral-popliteal bypass graft in the past, history of femur fracture requiring multiple operations to fix the femur in 1990s, former smoker who quit in 1996 presenting with two episodes of hematemesis. MICU reconsulted for hypotension and Hgb 5.9  2/2 likely UGIB. S/p cardiac arrest on 7/19 prior to MICU transfer w/ ROSC.     #Neuro  - baseline A&Ox3  - noted to wake up purposefully following cardiac arrest  - continue propofol for sedation, wean as tolerated when stable to assess neurological function following cardiac arrest.    #Cardiovascular  PEA arrest likely 2/2 hemorrhagic shock in setting of GIB  - 7/19- Prior to transfer to MICU, pt became hypotensive SBP~30s, then subsequently went into cardiac arrest. Code blue was called, pt found to be in PEA, received 4 epi's, 2 amps of bicarb and ROSC was achieved after approximately ~12.5 minutes.  - maxxed on levo, amber, vaso  - s/p 5 uPRBC today, HB 5.3->8.6, cont to monitor CBC closely and tranfuse PRN for Hb >7  - s/p 1L LR and started on bicarb gtt @100cc/hr for acidosis pH 6.9  - EKG NSR w/ RBBB TWI in aVR, aVL. monitor trops.   - ECHO 7/18: 1. Systolic anterior motion of the mitral valve. 2. Reversal of the E-A  waves of the mitral inflow pattern is consistent with diastolic LV dysfunction. 3. Normal right ventricular size and function. There is a significant LVOT gradient. Doppler envelopes overlap with mitral signals making accurate gradients difficult to obtain. Grossly, there is at least mild to moderate obstruction with a peak gradient of 26 mm Hg and mean 12 mm Hg. These are likely underestimations.  - POCUS: Grossly normal LVSF. LV > RV. Hypertrophied interventricular septum with systolic anterior motion of the mitral valve. No pericardial effusion. IVC 0.6 cm with respiratory variation.     #Respiratory  intubated for airway protection s/p cardiac arrest 7/19  - vent settings 23/500/5/50%  - ABG - ( 19 Jul 2022 14:50 ) pH, Arterial: 6.90  pH, Blood: x     /  pCO2: 30    /  pO2: 195   / HCO3: 6     / Base Excess: -25.4 /  SaO2: 100.0   - CXR ordered for ETT placement confirmation    #GI/Nutrition  Upper GIB, hemorraghic shock   - presented w/ coffee ground emesis on 7/16, states had EGD/colonoscopy in 2015 which was normal  - s/p 5 uPRBC today, received total of 4u PRBC on medical floors  - OGT placed upon arrival to MICU, ~200cc bright red blood noted when placed to wall suction, GI called for EGD now that pt has stabilized to evaluate for active bleed.  - cont protonix gtt  - monitor CBC Q6hrs, transfuse PRN to maintain HB >6    #/Renal  MELECIO , likely multifactorial in settin gof hypotension/GIB/anemia  - sCr 2.53 on admission. Unknown baseline, sCr 1.3 in 2015. Now uptrending ,  3.76 post arrest   - max dose on tripple pressors, however now now   unstable for HD at this time  Daily BMP    Metabolic acidosis  -Due to lactate    GIB  Per GI recs    Poor prognosis  Unable to reach famil    #Skin  - no active issues    #ID  - no active issues    #Endocrine  - no active issues    #Hematologic/DVT ppx  - Hb 7.9 on admission, noted to be 5.3 on 7/19  - SCDs in setting of GIB    #Ethics  FULL CODE    Lines:  R Axillary A- line (7/19- )  L IJ CVC ( 7/19-)  peripherals       Sal Boles MD MICU Accept Note    CHIEF COMPLAINT: UGIB    HPI / INTERVAL HISTORY:    77 y/o M with PMH of T2DM, HTN, HLD, severe peripheral vascular disease, status post left right femoral-femoral bypass with right femoral-popliteal bypass graft in the past, history of femur fracture requiring multiple operations to fix the femur in 1990s, former smoker who quit in 1996 presenting with two episodes of hematemesis. Pt reports this has never happened before, first episode was Friday night, then on Saturday morning. Vomitus was coffee ground, sometimes had bright red blood, was approximately 1 cup in volume per pt. Had a EGD/colonoscopy approximately 1 year ago, reports no abnormalities as far as he can remember. Reports no abdominal pain, no diarrhea, no bloody stool, no hemoptysis no hematochezia, melena, dysuria, hematuria. Denied fever, chills, CP, SOB, nausea, vomiting.     MICU reconsulted for hypotension to SBP 80s, Hgb 5.6,  c/f UGIB. Plan for EGD later today (7/19) with GI. Repeat Hgb was 5.3, ordered for bleeding scan to evaluate for source of GI bleed. Pt was also ordered for 2u PRBC. Pt accepted to MICU for close monitoring.  Prior to transfer to MICU, pt became hypotensive SBP~30s, then subsequently went into cardiac arrest. Code blue was called, pt found to be in PEA, received 4 epi's, 2 amps of bicarb and ROSC was achieved after approximately ~12.5 minutes.  Post arrest 1 additional u PRBC ordered. He was started propofol for sedation. Required pressor support w/ levo, amber gtt maxed. Also received 1 addition amp bicarb, fent 50x2.    ABG obtained which showed pH 6.9, pCO2 25, pO2 204, lactate 7.3. Of note observed to have woken up following arrest.  Upon transfer to MICU pt remained hypotensive ~SBP 50, vaso gtt was added. He also received additional bicarb pushes and started on bicarb gtt for pH 6.9.   POCUS showed collapsed IVC, preserved LV function. 1L LR bolus was ordered. OGT placed to suction was noted w/ bright red blood.   After receiving total of 4uPRBC Hb improved to 8.6.   GI came to bedside for EGD, active bleed noted in proximal duodenum, IR consulted emergently.      PAST MEDICAL & SURGICAL HISTORY:  Hypertension      Diabetes mellitus      Glaucoma      Fracture of femur  Had surgery (1997 )      Hyperlipidemia      S/P appendectomy      S/P femoral-popliteal bypass surgery      History of inferior vena caval filter placement  Mark Filter      Femoral fracture  Right &amp; had surgery ( 1997 )      Advance Directives:     HOME MEDICATIONS:      Allergies    No Known Allergies    Intolerances      OBJECTIVE:  ICU Vital Signs Last 24 Hrs  T(C): 36.4 (19 Jul 2022 05:25), Max: 36.8 (18 Jul 2022 09:25)  T(F): 97.5 (19 Jul 2022 05:25), Max: 98.2 (18 Jul 2022 09:25)  HR: 104 (19 Jul 2022 05:25) (90 - 115)  BP: 93/41 (19 Jul 2022 05:25) (83/61 - 136/68)  BP(mean): --  ABP: --  ABP(mean): --  RR: 19 (19 Jul 2022 05:25) (16 - 20)  SpO2: 100% (19 Jul 2022 05:25) (95% - 100%)    O2 Parameters below as of 19 Jul 2022 05:25  Patient On (Oxygen Delivery Method): nasal cannula,2        07-18 @ 07:01  -  07-19 @ 05:26  --------------------------------------------------------  IN: 0 mL / OUT: 250 mL / NET: -250 mL      CAPILLARY BLOOD GLUCOSE      POCT Blood Glucose.: 214 mg/dL (19 Jul 2022 01:51)      PHYSICAL EXAM:  GENERAL: NAD, sedated  HEAD:  Atraumatic, Normocephalic  EYES: EOMI, PERRLA, conjunctiva and sclera clear  ENT: Moist mucous membranes  NECK: Supple, No JVD  CHEST/LUNG: ETT in place, on mechanical ventialtion. Clear to auscultation bilaterally; No rales, rhonchi, wheezing, or rubs.   HEART: Regular rate and rhythm; No murmurs, rubs, or gallops  ABDOMEN: BSx4; Soft, nontender, nondistended  EXTREMITIES:  2+ Peripheral Pulses, brisk capillary refill. No clubbing, cyanosis, or edema  NERVOUS SYSTEM:  intubated and sedated  SKIN: No rashes or lesions    LINES:     HOSPITAL MEDICATIONS:  MEDICATIONS  (STANDING):  atorvastatin 80 milliGRAM(s) Oral at bedtime  dextrose 5%. 1000 milliLiter(s) (50 mL/Hr) IV Continuous <Continuous>  dextrose 5%. 1000 milliLiter(s) (100 mL/Hr) IV Continuous <Continuous>  dextrose 50% Injectable 25 Gram(s) IV Push once  dextrose 50% Injectable 12.5 Gram(s) IV Push once  dextrose 50% Injectable 25 Gram(s) IV Push once  glucagon  Injectable 1 milliGRAM(s) IntraMuscular once  insulin glargine Injectable (LANTUS) 4 Unit(s) SubCutaneous <User Schedule>  insulin lispro (ADMELOG) corrective regimen sliding scale   SubCutaneous every 6 hours  pantoprazole Infusion 8 mG/Hr (10 mL/Hr) IV Continuous <Continuous>  potassium phosphate / sodium phosphate Powder (PHOS-NaK) 1 Packet(s) Oral once  sodium chloride 0.9% Bolus 500 milliLiter(s) IV Bolus once  sodium chloride 0.9%. 1000 milliLiter(s) (75 mL/Hr) IV Continuous <Continuous>    MEDICATIONS  (PRN):  dextrose Oral Gel 15 Gram(s) Oral once PRN Blood Glucose LESS THAN 70 milliGRAM(s)/deciliter  ondansetron Injectable 4 milliGRAM(s) IV Push every 8 hours PRN Nausea and/or Vomiting  oxycodone    5 mG/acetaminophen 325 mG 1 Tablet(s) Oral every 8 hours PRN Severe Pain (7 - 10)      LABS:                        5.3    15.78 )-----------( 132      ( 19 Jul 2022 04:44 )             15.7     Hgb Trend: 5.3<--, 5.6<--, 8.7<--, 7.1<--, 7.8<--  07-19    145  |  113<H>  |  120<H>  ----------------------------<  163<H>  4.6   |  10<LL>  |  2.58<H>    Ca    8.5      19 Jul 2022 04:44  Phos  5.2     07-19  Mg     2.10     07-19    TPro  4.4<L>  /  Alb  2.9<L>  /  TBili  0.7  /  DBili  x   /  AST  37  /  ALT  44<H>  /  AlkPhos  30<L>  07-19    Creatinine Trend: 2.58<--, 2.43<--, 1.88<--, 1.79<--, 2.53<--  PT/INR - ( 19 Jul 2022 04:44 )   PT: 16.4 sec;   INR: 1.41 ratio         PTT - ( 19 Jul 2022 04:44 )  PTT:24.9 sec      RADIOLOGY & ADDITIONAL TESTS:      ASSESSMENT AND PLAN:  77 y/o M with PMH of T2DM, HTN, HLD, severe peripheral vascular disease, status post left right femoral-femoral bypass with right femoral-popliteal bypass graft in the past, history of femur fracture requiring multiple operations to fix the femur in 1990s, former smoker who quit in 1996 presenting with two episodes of hematemesis. MICU reconsulted for hypotension and Hgb 5.9  2/2 likely UGIB. S/p cardiac arrest on 7/19 prior to MICU transfer w/ ROSC.     #Neuro  - baseline A&Ox3  - noted to wake up purposefully following cardiac arrest  - continue propofol for sedation, wean as tolerated when stable to assess neurological function following cardiac arrest.    #Cardiovascular  PEA arrest likely 2/2 hemorrhagic shock in setting of GIB  - 7/19- Prior to transfer to MICU, pt became hypotensive SBP~30s, then subsequently went into cardiac arrest. Code blue was called, pt found to be in PEA, received 4 epi's, 2 amps of bicarb and ROSC was achieved after approximately ~12.5 minutes.  - maxxed on levo, amber, vaso  - s/p 5 uPRBC today, HB 5.3->8.6,  - cont to monitor CBC closely and tranfuse PRN for Hb >7  - s/p 1L LR and started on bicarb gtt @100cc/hr for acidosis pH 6.9  - EKG NSR w/ RBBB TWI in aVR, aVL. monitor trops.   - ECHO 7/18: 1. Systolic anterior motion of the mitral valve. 2. Reversal of the E-A  waves of the mitral inflow pattern is consistent with diastolic LV dysfunction. 3. Normal right ventricular size and function. There is a significant LVOT gradient. Doppler envelopes overlap with mitral signals making accurate gradients difficult to obtain. Grossly, there is at least mild to moderate obstruction with a peak gradient of 26 mm Hg and mean 12 mm Hg. These are likely underestimations.  - POCUS: Grossly normal LVSF. LV > RV. Hypertrophied interventricular septum with systolic anterior motion of the mitral valve. No pericardial effusion. IVC 0.6 cm with respiratory variation.     #Respiratory  intubated for airway protection s/p cardiac arrest 7/19  - vent settings 23/500/5/50%  - ABG - ( 19 Jul 2022 14:50 ) pH, Arterial: 6.90  pH, Blood: x     /  pCO2: 30    /  pO2: 195   / HCO3: 6     / Base Excess: -25.4 /  SaO2: 100.0   - CXR ordered for ETT placement confirmation    #GI/Nutrition  Upper GIB, hemorraghic shock   - presented w/ coffee ground emesis on 7/16, states had EGD/colonoscopy in 2015 which was normal  - s/p 5 uPRBC today, received total of 4u PRBC on medical floors  - OGT placed upon arrival to MICU, ~200cc bright red blood noted when placed to wall suction, GI called for EGD   - noted w/ active bleed at proximal duodenum on bedside scope s/p epi and clipping w/ some slowing of bleeding, also became hypotensive to 60's, MTP called  - plan for IR embolization if remains stable  - cont protonix gtt  - monitor CBC Q6hrs, transfuse PRN to maintain HB >6    #/Renal  MELECIO , likely multifactorial in setting of hypotension/GIB/anemia  - sCr 2.53 on admission. Unknown baseline, sCr 1.3 in 2015. Now uptrending ,  3.76 post arrest   - max dose on tripple pressors, unstable for HD at this time  - cmp q6hrs    Metabolic acidosis  - Due to lactate  - cont bicarb gtt, CRRT it stabilizes     #Skin  - no active issues    #ID  - no active issues    #Endocrine  - no active issues    #Hematologic/DVT ppx  - Hb 7.9 on admission, noted to be 5.3 on 7/19  -s/p MTP on admission to MICU 7/19  - SCDs in setting of GIB    #Ethics  FULL CODE    Lines:  R Axillary A- line (7/19- )  L IJ CVC ( 7/19-)  peripherals       Sal Boles MD MICU Accept Note    CHIEF COMPLAINT: UGIB    HPI / INTERVAL HISTORY:    75 y/o M with PMH of T2DM, HTN, HLD, severe peripheral vascular disease, status post left right femoral-femoral bypass with right femoral-popliteal bypass graft in the past, history of femur fracture requiring multiple operations to fix the femur in 1990s, former smoker who quit in 1996 presenting with two episodes of hematemesis. Pt reports this has never happened before, first episode was Friday night, then on Saturday morning. Vomitus was coffee ground, sometimes had bright red blood, was approximately 1 cup in volume per pt. Had a EGD/colonoscopy approximately 1 year ago, reports no abnormalities as far as he can remember. Reports no abdominal pain, no diarrhea, no bloody stool, no hemoptysis no hematochezia, melena, dysuria, hematuria. Denied fever, chills, CP, SOB, nausea, vomiting.     MICU reconsulted for hypotension to SBP 80s, Hgb 5.6,  c/f UGIB. Plan for EGD later today (7/19) with GI. Repeat Hgb was 5.3, ordered for bleeding scan to evaluate for source of GI bleed. Pt was also ordered for 2u PRBC. Pt accepted to MICU for close monitoring.  Prior to transfer to MICU, pt became hypotensive SBP~30s, then subsequently went into cardiac arrest. Code blue was called, pt found to be in PEA, received 4 epi's, 2 amps of bicarb and ROSC was achieved after approximately ~12.5 minutes.  Post arrest 1 additional u PRBC ordered. He was started propofol for sedation. Required pressor support w/ levo, amber gtt maxed. Also received 1 addition amp bicarb, fent 50x2.    ABG obtained which showed pH 6.9, pCO2 25, pO2 204, lactate 7.3. Of note observed to have woken up following arrest.  Upon transfer to MICU pt remained hypotensive ~SBP 50, vaso gtt was added. He also received additional bicarb pushes and started on bicarb gtt for pH 6.9.   POCUS showed collapsed IVC, preserved LV function. 1L LR bolus was ordered. OGT placed to suction was noted w/ bright red blood.   After receiving total of 4uPRBC Hb improved to 8.6.   GI came to bedside for EGD, active bleed noted in proximal duodenum, IR consulted emergently.      PAST MEDICAL & SURGICAL HISTORY:  Hypertension      Diabetes mellitus      Glaucoma      Fracture of femur  Had surgery (1997 )      Hyperlipidemia      S/P appendectomy      S/P femoral-popliteal bypass surgery      History of inferior vena caval filter placement  Mark Filter      Femoral fracture  Right &amp; had surgery ( 1997 )      Advance Directives:     HOME MEDICATIONS:      Allergies    No Known Allergies    Intolerances      OBJECTIVE:  ICU Vital Signs Last 24 Hrs  T(C): 36.4 (19 Jul 2022 05:25), Max: 36.8 (18 Jul 2022 09:25)  T(F): 97.5 (19 Jul 2022 05:25), Max: 98.2 (18 Jul 2022 09:25)  HR: 104 (19 Jul 2022 05:25) (90 - 115)  BP: 93/41 (19 Jul 2022 05:25) (83/61 - 136/68)  BP(mean): --  ABP: --  ABP(mean): --  RR: 19 (19 Jul 2022 05:25) (16 - 20)  SpO2: 100% (19 Jul 2022 05:25) (95% - 100%)    O2 Parameters below as of 19 Jul 2022 05:25  Patient On (Oxygen Delivery Method): nasal cannula,2        07-18 @ 07:01  -  07-19 @ 05:26  --------------------------------------------------------  IN: 0 mL / OUT: 250 mL / NET: -250 mL      CAPILLARY BLOOD GLUCOSE      POCT Blood Glucose.: 214 mg/dL (19 Jul 2022 01:51)      PHYSICAL EXAM:  GENERAL: NAD, sedated  HEAD:  Atraumatic, Normocephalic  EYES: EOMI, PERRLA, conjunctiva and sclera clear  ENT: Moist mucous membranes  NECK: Supple, No JVD  CHEST/LUNG: ETT in place, on mechanical ventialtion. Clear to auscultation bilaterally; No rales, rhonchi, wheezing, or rubs.   HEART: Regular rate and rhythm; No murmurs, rubs, or gallops  ABDOMEN: BSx4; Soft, nontender, nondistended  EXTREMITIES:  2+ Peripheral Pulses, brisk capillary refill. No clubbing, cyanosis, or edema  NERVOUS SYSTEM:  intubated and sedated  SKIN: No rashes or lesions    LINES:     HOSPITAL MEDICATIONS:  MEDICATIONS  (STANDING):  atorvastatin 80 milliGRAM(s) Oral at bedtime  dextrose 5%. 1000 milliLiter(s) (50 mL/Hr) IV Continuous <Continuous>  dextrose 5%. 1000 milliLiter(s) (100 mL/Hr) IV Continuous <Continuous>  dextrose 50% Injectable 25 Gram(s) IV Push once  dextrose 50% Injectable 12.5 Gram(s) IV Push once  dextrose 50% Injectable 25 Gram(s) IV Push once  glucagon  Injectable 1 milliGRAM(s) IntraMuscular once  insulin glargine Injectable (LANTUS) 4 Unit(s) SubCutaneous <User Schedule>  insulin lispro (ADMELOG) corrective regimen sliding scale   SubCutaneous every 6 hours  pantoprazole Infusion 8 mG/Hr (10 mL/Hr) IV Continuous <Continuous>  potassium phosphate / sodium phosphate Powder (PHOS-NaK) 1 Packet(s) Oral once  sodium chloride 0.9% Bolus 500 milliLiter(s) IV Bolus once  sodium chloride 0.9%. 1000 milliLiter(s) (75 mL/Hr) IV Continuous <Continuous>    MEDICATIONS  (PRN):  dextrose Oral Gel 15 Gram(s) Oral once PRN Blood Glucose LESS THAN 70 milliGRAM(s)/deciliter  ondansetron Injectable 4 milliGRAM(s) IV Push every 8 hours PRN Nausea and/or Vomiting  oxycodone    5 mG/acetaminophen 325 mG 1 Tablet(s) Oral every 8 hours PRN Severe Pain (7 - 10)      LABS:                        5.3    15.78 )-----------( 132      ( 19 Jul 2022 04:44 )             15.7     Hgb Trend: 5.3<--, 5.6<--, 8.7<--, 7.1<--, 7.8<--  07-19    145  |  113<H>  |  120<H>  ----------------------------<  163<H>  4.6   |  10<LL>  |  2.58<H>    Ca    8.5      19 Jul 2022 04:44  Phos  5.2     07-19  Mg     2.10     07-19    TPro  4.4<L>  /  Alb  2.9<L>  /  TBili  0.7  /  DBili  x   /  AST  37  /  ALT  44<H>  /  AlkPhos  30<L>  07-19    Creatinine Trend: 2.58<--, 2.43<--, 1.88<--, 1.79<--, 2.53<--  PT/INR - ( 19 Jul 2022 04:44 )   PT: 16.4 sec;   INR: 1.41 ratio         PTT - ( 19 Jul 2022 04:44 )  PTT:24.9 sec      RADIOLOGY & ADDITIONAL TESTS:      ASSESSMENT AND PLAN:  75 y/o M with PMH of T2DM, HTN, HLD, severe peripheral vascular disease, status post left right femoral-femoral bypass with right femoral-popliteal bypass graft in the past, history of femur fracture requiring multiple operations to fix the femur in 1990s, former smoker who quit in 1996 presenting with two episodes of hematemesis. MICU reconsulted for hypotension and Hgb 5.9  2/2 likely UGIB. S/p cardiac arrest on 7/19 prior to MICU transfer w/ ROSC.     #Neuro  - baseline A&Ox3  - noted to wake up purposefully following cardiac arrest  - continue propofol for sedation, wean as tolerated when stable to assess neurological function following cardiac arrest.    #Cardiovascular  PEA arrest likely 2/2 hemorrhagic shock in setting of GIB  - 7/19- Prior to transfer to MICU, pt became hypotensive SBP~30s, then subsequently went into cardiac arrest. Code blue was called, pt found to be in PEA, received 4 epi's, 2 amps of bicarb and ROSC was achieved after approximately ~12.5 minutes.  - maxxed on levo, amber, vaso  - s/p 5 uPRBC today, HB 5.3->8.6,  - cont to monitor CBC closely and tranfuse PRN for Hb >7  - s/p 1L LR and started on bicarb gtt @100cc/hr for acidosis pH 6.9  - EKG NSR w/ RBBB TWI in aVR, aVL. monitor trops.   - ECHO 7/18: 1. Systolic anterior motion of the mitral valve. 2. Reversal of the E-A  waves of the mitral inflow pattern is consistent with diastolic LV dysfunction. 3. Normal right ventricular size and function. There is a significant LVOT gradient. Doppler envelopes overlap with mitral signals making accurate gradients difficult to obtain. Grossly, there is at least mild to moderate obstruction with a peak gradient of 26 mm Hg and mean 12 mm Hg. These are likely underestimations.  - POCUS: Grossly normal LVSF. LV > RV. Hypertrophied interventricular septum with systolic anterior motion of the mitral valve. No pericardial effusion. IVC 0.6 cm with respiratory variation.     #Respiratory  intubated for airway protection s/p cardiac arrest 7/19  - vent settings 23/500/5/50%  - ABG - ( 19 Jul 2022 14:50 ) pH, Arterial: 6.90  pH, Blood: x     /  pCO2: 30    /  pO2: 195   / HCO3: 6     / Base Excess: -25.4 /  SaO2: 100.0   - CXR ordered for ETT placement confirmation    #GI/Nutrition  Upper GIB, hemorraghic shock   - presented w/ coffee ground emesis on 7/16, states had EGD/colonoscopy in 2015 which was normal  - s/p 6 uPRBC after MICU admssion initially, received total of 4u PRBC on medical floors  - OGT placed upon arrival to MICU, ~200cc bright red blood noted when placed to wall suction, GI called for EGD   - noted w/ active bleed at proximal duodenum on bedside scope s/p epi and clipping w/ some slowing of bleeding, also became hypotensive to 60's, MTP called ( s/p 2 units PRBC, 1 u PLT, 1 U FFP, 1 cryo)  - plan for IR embolization if remains stable  - cont protonix gtt  - monitor CBC Q6hrs, transfuse PRN to maintain HB >6    #/Renal  MELECIO , likely multifactorial in setting of hypotension/GIB/anemia  - sCr 2.53 on admission. Unknown baseline, sCr 1.3 in 2015. Now uptrending ,  3.76 post arrest   - max dose on tripple pressors, unstable for HD at this time  - cmp q6hrs  Metabolic acidosis  - Due to lactate  - cont bicarb gtt, CRRT it stabilizes     #Skin  - no active issues    #ID  - no active issues    #Endocrine  - no active issues    #Hematologic/DVT ppx  - Hb 7.9 on admission, noted to be 5.3 on 7/19  -s/p MTP on admission to MICU 7/19  - SCDs in setting of GIB    #Ethics  FULL CODE    Lines:  R Axillary A- line (7/19- )  L IJ CVC ( 7/19-)  peripherals       Sal Boles MD MICU Accept Note    CHIEF COMPLAINT: UGIB    HPI / INTERVAL HISTORY:    75 y/o M with PMH of T2DM, HTN, HLD, severe peripheral vascular disease, status post left right femoral-femoral bypass with right femoral-popliteal bypass graft in the past, history of femur fracture requiring multiple operations to fix the femur in 1990s, former smoker who quit in 1996 presenting with two episodes of hematemesis. Pt reports this has never happened before, first episode was Friday night, then on Saturday morning. Vomitus was coffee ground, sometimes had bright red blood, was approximately 1 cup in volume per pt. Had a EGD/colonoscopy approximately 1 year ago, reports no abnormalities as far as he can remember. Reports no abdominal pain, no diarrhea, no bloody stool, no hemoptysis no hematochezia, melena, dysuria, hematuria. Denied fever, chills, CP, SOB, nausea, vomiting.     MICU reconsulted for hypotension to SBP 80s, Hgb 5.6,  c/f UGIB. Plan for EGD later today (7/19) with GI. Repeat Hgb was 5.3, ordered for bleeding scan to evaluate for source of GI bleed. Pt was also ordered for 2u PRBC. Pt accepted to MICU for close monitoring.  Prior to transfer to MICU, pt became hypotensive SBP~30s, then subsequently went into cardiac arrest. Code blue was called, pt found to be in PEA, received 4 epi's, 2 amps of bicarb and ROSC was achieved after approximately ~12.5 minutes.  Post arrest 1 additional u PRBC ordered. He was started propofol for sedation. Required pressor support w/ levo, amber gtt maxed. Also received 1 addition amp bicarb, fent 50x2.    ABG obtained which showed pH 6.9, pCO2 25, pO2 204, lactate 7.3. Of note observed to have woken up following arrest.  Upon transfer to MICU pt remained hypotensive ~SBP 50, vaso gtt was added. He also received additional bicarb pushes and started on bicarb gtt for pH 6.9.   POCUS showed collapsed IVC, preserved LV function. 1L LR bolus was ordered. OGT placed to suction was noted w/ bright red blood.   After receiving total of 4uPRBC Hb improved to 8.6.   GI came to bedside for EGD, active bleed noted in proximal duodenum, IR consulted emergently.      PAST MEDICAL & SURGICAL HISTORY:  Hypertension      Diabetes mellitus      Glaucoma      Fracture of femur  Had surgery (1997 )      Hyperlipidemia      S/P appendectomy      S/P femoral-popliteal bypass surgery      History of inferior vena caval filter placement  Mark Filter      Femoral fracture  Right &amp; had surgery ( 1997 )      Advance Directives:     HOME MEDICATIONS:      Allergies    No Known Allergies    Intolerances      OBJECTIVE:  ICU Vital Signs Last 24 Hrs  T(C): 36.4 (19 Jul 2022 05:25), Max: 36.8 (18 Jul 2022 09:25)  T(F): 97.5 (19 Jul 2022 05:25), Max: 98.2 (18 Jul 2022 09:25)  HR: 104 (19 Jul 2022 05:25) (90 - 115)  BP: 93/41 (19 Jul 2022 05:25) (83/61 - 136/68)  BP(mean): --  ABP: --  ABP(mean): --  RR: 19 (19 Jul 2022 05:25) (16 - 20)  SpO2: 100% (19 Jul 2022 05:25) (95% - 100%)    O2 Parameters below as of 19 Jul 2022 05:25  Patient On (Oxygen Delivery Method): nasal cannula,2        07-18 @ 07:01  -  07-19 @ 05:26  --------------------------------------------------------  IN: 0 mL / OUT: 250 mL / NET: -250 mL      CAPILLARY BLOOD GLUCOSE      POCT Blood Glucose.: 214 mg/dL (19 Jul 2022 01:51)      PHYSICAL EXAM:  GENERAL: NAD, sedated  HEAD:  Atraumatic, Normocephalic  EYES: EOMI, PERRLA, conjunctiva and sclera clear  ENT: Moist mucous membranes  NECK: Supple, No JVD  CHEST/LUNG: ETT in place, on mechanical ventialtion. Clear to auscultation bilaterally; No rales, rhonchi, wheezing, or rubs.   HEART: Regular rate and rhythm; No murmurs, rubs, or gallops  ABDOMEN: BSx4; Soft, nontender, nondistended  EXTREMITIES:  2+ Peripheral Pulses, brisk capillary refill. No clubbing, cyanosis, or edema  NERVOUS SYSTEM:  intubated and sedated  SKIN: No rashes or lesions    LINES:     HOSPITAL MEDICATIONS:  MEDICATIONS  (STANDING):  atorvastatin 80 milliGRAM(s) Oral at bedtime  dextrose 5%. 1000 milliLiter(s) (50 mL/Hr) IV Continuous <Continuous>  dextrose 5%. 1000 milliLiter(s) (100 mL/Hr) IV Continuous <Continuous>  dextrose 50% Injectable 25 Gram(s) IV Push once  dextrose 50% Injectable 12.5 Gram(s) IV Push once  dextrose 50% Injectable 25 Gram(s) IV Push once  glucagon  Injectable 1 milliGRAM(s) IntraMuscular once  insulin glargine Injectable (LANTUS) 4 Unit(s) SubCutaneous <User Schedule>  insulin lispro (ADMELOG) corrective regimen sliding scale   SubCutaneous every 6 hours  pantoprazole Infusion 8 mG/Hr (10 mL/Hr) IV Continuous <Continuous>  potassium phosphate / sodium phosphate Powder (PHOS-NaK) 1 Packet(s) Oral once  sodium chloride 0.9% Bolus 500 milliLiter(s) IV Bolus once  sodium chloride 0.9%. 1000 milliLiter(s) (75 mL/Hr) IV Continuous <Continuous>    MEDICATIONS  (PRN):  dextrose Oral Gel 15 Gram(s) Oral once PRN Blood Glucose LESS THAN 70 milliGRAM(s)/deciliter  ondansetron Injectable 4 milliGRAM(s) IV Push every 8 hours PRN Nausea and/or Vomiting  oxycodone    5 mG/acetaminophen 325 mG 1 Tablet(s) Oral every 8 hours PRN Severe Pain (7 - 10)      LABS:                        5.3    15.78 )-----------( 132      ( 19 Jul 2022 04:44 )             15.7     Hgb Trend: 5.3<--, 5.6<--, 8.7<--, 7.1<--, 7.8<--  07-19    145  |  113<H>  |  120<H>  ----------------------------<  163<H>  4.6   |  10<LL>  |  2.58<H>    Ca    8.5      19 Jul 2022 04:44  Phos  5.2     07-19  Mg     2.10     07-19    TPro  4.4<L>  /  Alb  2.9<L>  /  TBili  0.7  /  DBili  x   /  AST  37  /  ALT  44<H>  /  AlkPhos  30<L>  07-19    Creatinine Trend: 2.58<--, 2.43<--, 1.88<--, 1.79<--, 2.53<--  PT/INR - ( 19 Jul 2022 04:44 )   PT: 16.4 sec;   INR: 1.41 ratio         PTT - ( 19 Jul 2022 04:44 )  PTT:24.9 sec      RADIOLOGY & ADDITIONAL TESTS:      ASSESSMENT AND PLAN:  75 y/o M with PMH of T2DM, HTN, HLD, severe peripheral vascular disease, status post left right femoral-femoral bypass with right femoral-popliteal bypass graft in the past, history of femur fracture requiring multiple operations to fix the femur in 1990s, former smoker who quit in 1996 presenting with two episodes of hematemesis. MICU reconsulted for hypotension and Hgb 5.9  2/2 likely UGIB. S/p cardiac arrest on 7/19 prior to MICU transfer w/ ROSC.     #Neuro  - baseline A&Ox3  - noted to wake up purposefully following cardiac arrest  - continue propofol for sedation, wean as tolerated when stable to assess neurological function following cardiac arrest.    #Cardiovascular  PEA arrest likely 2/2 hemorrhagic shock in setting of GIB  - 7/19- Prior to transfer to MICU, pt became hypotensive SBP~30s, then subsequently went into cardiac arrest. Code blue was called, pt found to be in PEA, received 4 epi's, 2 amps of bicarb and ROSC was achieved after approximately ~12.5 minutes.  - maxxed on levo, amber, vaso  - s/p 5 uPRBC today, HB 5.3->8.6,  - cont to monitor CBC closely and tranfuse PRN for Hb >7  - s/p 1L LR and started on bicarb gtt @100cc/hr for acidosis pH 6.9  - EKG NSR w/ RBBB TWI in aVR, aVL. monitor trops.   - ECHO 7/18: 1. Systolic anterior motion of the mitral valve. 2. Reversal of the E-A  waves of the mitral inflow pattern is consistent with diastolic LV dysfunction. 3. Normal right ventricular size and function. There is a significant LVOT gradient. Doppler envelopes overlap with mitral signals making accurate gradients difficult to obtain. Grossly, there is at least mild to moderate obstruction with a peak gradient of 26 mm Hg and mean 12 mm Hg. These are likely underestimations.  - POCUS: Grossly normal LVSF. LV > RV. Hypertrophied interventricular septum with systolic anterior motion of the mitral valve. No pericardial effusion. IVC 0.6 cm with respiratory variation.     #Respiratory  intubated for airway protection s/p cardiac arrest 7/19  - vent settings 23/500/5/50%  - ABG - ( 19 Jul 2022 14:50 ) pH, Arterial: 6.90  pH, Blood: x     /  pCO2: 30    /  pO2: 195   / HCO3: 6     / Base Excess: -25.4 /  SaO2: 100.0   - CXR ordered for ETT placement confirmation    #GI/Nutrition  Upper GIB, hemorraghic shock   - presented w/ coffee ground emesis on 7/16, states had EGD/colonoscopy in 2015 which was normal  - s/p 6 uPRBC after MICU admssion initially, received total of 4u PRBC on medical floors  - OGT placed upon arrival to MICU, ~200cc bright red blood noted when placed to wall suction, GI called for EGD   - noted w/ active bleed at proximal duodenum on bedside scope s/p epi and clipping w/ some slowing of bleeding, also became hypotensive to 60's, MTP called ( s/p 2 units PRBC, 1 u PLT, 1 U FFP, 1 cryo)  - plan for IR embolization if remains stable  - cont protonix gtt  - monitor CBC Q6hrs, transfuse PRN to maintain HB >6  Shock liver  - in setting of hypoperfusion 2/2 anemia from GIB/cardiac arrest  - monitor synthetic function closely     #/Renal  MELECIO , likely multifactorial in setting of hypotension/GIB/anemia  - sCr 2.53 on admission. Unknown baseline, sCr 1.3 in 2015. Now uptrending ,  3.76 post arrest   - max dose on tripple pressors, unstable for HD at this time  - cmp q6hrs  Metabolic acidosis  - Due to lactate  - cont bicarb gtt, CRRT it stabilizes     #Skin  - no active issues    #ID  - no active issues    #Endocrine  - no active issues    #Hematologic/DVT ppx  - Hb 7.9 on admission, noted to be 5.3 on 7/19  -s/p MTP on admission to MICU 7/19  - SCDs in setting of GIB    #Ethics  FULL CODE    Lines:  R Axillary A- line (7/19- )  L IJ CVC ( 7/19-)  peripherals       Sal Boles MD MICU Accept Note    CHIEF COMPLAINT: UGIB    HPI / INTERVAL HISTORY:    77 y/o M with PMH of T2DM, HTN, HLD, severe peripheral vascular disease, status post left right femoral-femoral bypass with right femoral-popliteal bypass graft in the past, history of femur fracture requiring multiple operations to fix the femur in 1990s, former smoker who quit in 1996 presenting with two episodes of hematemesis. Pt reports this has never happened before, first episode was Friday night, then on Saturday morning. Vomitus was coffee ground, sometimes had bright red blood, was approximately 1 cup in volume per pt. Had a EGD/colonoscopy approximately 1 year ago, reports no abnormalities as far as he can remember. Reports no abdominal pain, no diarrhea, no bloody stool, no hemoptysis no hematochezia, melena, dysuria, hematuria. Denied fever, chills, CP, SOB, nausea, vomiting.     MICU reconsulted for hypotension to SBP 80s, Hgb 5.6,  c/f UGIB. Plan for EGD later today (7/19) with GI. Repeat Hgb was 5.3, ordered for bleeding scan to evaluate for source of GI bleed. Pt was also ordered for 2u PRBC. Pt accepted to MICU for close monitoring.  Prior to transfer to MICU, pt became hypotensive SBP~30s, then subsequently went into cardiac arrest. Code blue was called, pt found to be in PEA, received 4 epi's, 2 amps of bicarb and ROSC was achieved after approximately ~12.5 minutes.  Post arrest 1 additional u PRBC ordered. He was started propofol for sedation. Required pressor support w/ levo, amber gtt maxed. Also received 1 addition amp bicarb, fent 50x2.    ABG obtained which showed pH 6.9, pCO2 25, pO2 204, lactate 7.3. Of note observed to have woken up following arrest.  Upon transfer to MICU pt remained hypotensive ~SBP 50, vaso gtt was added. He also received additional bicarb pushes and started on bicarb gtt for pH 6.9.   POCUS showed collapsed IVC, preserved LV function. 1L LR bolus was ordered. OGT placed to suction was noted w/ bright red blood.   After receiving total of 4uPRBC Hb improved to 8.6.   GI came to bedside for EGD, active bleed noted in proximal duodenum, IR consulted emergently. MTP activated.      PAST MEDICAL & SURGICAL HISTORY:  Hypertension      Diabetes mellitus      Glaucoma      Fracture of femur  Had surgery (1997 )      Hyperlipidemia      S/P appendectomy      S/P femoral-popliteal bypass surgery      History of inferior vena caval filter placement  [place] Filter      Femoral fracture  Right &amp; had surgery ( 1997 )      Advance Directives:     HOME MEDICATIONS:      Allergies    No Known Allergies    Intolerances      OBJECTIVE:  ICU Vital Signs Last 24 Hrs  T(C): 36.4 (19 Jul 2022 05:25), Max: 36.8 (18 Jul 2022 09:25)  T(F): 97.5 (19 Jul 2022 05:25), Max: 98.2 (18 Jul 2022 09:25)  HR: 104 (19 Jul 2022 05:25) (90 - 115)  BP: 93/41 (19 Jul 2022 05:25) (83/61 - 136/68)  BP(mean): --  ABP: --  ABP(mean): --  RR: 19 (19 Jul 2022 05:25) (16 - 20)  SpO2: 100% (19 Jul 2022 05:25) (95% - 100%)    O2 Parameters below as of 19 Jul 2022 05:25  Patient On (Oxygen Delivery Method): nasal cannula,2        07-18 @ 07:01  -  07-19 @ 05:26  --------------------------------------------------------  IN: 0 mL / OUT: 250 mL / NET: -250 mL      CAPILLARY BLOOD GLUCOSE      POCT Blood Glucose.: 214 mg/dL (19 Jul 2022 01:51)      PHYSICAL EXAM:  GENERAL: NAD, sedated  HEAD:  Atraumatic, Normocephalic  EYES: EOMI, PERRLA, conjunctiva and sclera clear  ENT: Moist mucous membranes  NECK: Supple, No JVD  CHEST/LUNG: ETT in place, on mechanical ventialtion. Clear to auscultation bilaterally; No rales, rhonchi, wheezing, or rubs.   HEART: Regular rate and rhythm; No murmurs, rubs, or gallops  ABDOMEN: BSx4; Soft, nontender, nondistended  EXTREMITIES:  2+ Peripheral Pulses, brisk capillary refill. No clubbing, cyanosis, or edema  NERVOUS SYSTEM:  intubated and sedated  SKIN: No rashes or lesions    LINES:     HOSPITAL MEDICATIONS:  MEDICATIONS  (STANDING):  atorvastatin 80 milliGRAM(s) Oral at bedtime  dextrose 5%. 1000 milliLiter(s) (50 mL/Hr) IV Continuous <Continuous>  dextrose 5%. 1000 milliLiter(s) (100 mL/Hr) IV Continuous <Continuous>  dextrose 50% Injectable 25 Gram(s) IV Push once  dextrose 50% Injectable 12.5 Gram(s) IV Push once  dextrose 50% Injectable 25 Gram(s) IV Push once  glucagon  Injectable 1 milliGRAM(s) IntraMuscular once  insulin glargine Injectable (LANTUS) 4 Unit(s) SubCutaneous <User Schedule>  insulin lispro (ADMELOG) corrective regimen sliding scale   SubCutaneous every 6 hours  pantoprazole Infusion 8 mG/Hr (10 mL/Hr) IV Continuous <Continuous>  potassium phosphate / sodium phosphate Powder (PHOS-NaK) 1 Packet(s) Oral once  sodium chloride 0.9% Bolus 500 milliLiter(s) IV Bolus once  sodium chloride 0.9%. 1000 milliLiter(s) (75 mL/Hr) IV Continuous <Continuous>    MEDICATIONS  (PRN):  dextrose Oral Gel 15 Gram(s) Oral once PRN Blood Glucose LESS THAN 70 milliGRAM(s)/deciliter  ondansetron Injectable 4 milliGRAM(s) IV Push every 8 hours PRN Nausea and/or Vomiting  oxycodone    5 mG/acetaminophen 325 mG 1 Tablet(s) Oral every 8 hours PRN Severe Pain (7 - 10)      LABS:                        5.3    15.78 )-----------( 132      ( 19 Jul 2022 04:44 )             15.7     Hgb Trend: 5.3<--, 5.6<--, 8.7<--, 7.1<--, 7.8<--  07-19    145  |  113<H>  |  120<H>  ----------------------------<  163<H>  4.6   |  10<LL>  |  2.58<H>    Ca    8.5      19 Jul 2022 04:44  Phos  5.2     07-19  Mg     2.10     07-19    TPro  4.4<L>  /  Alb  2.9<L>  /  TBili  0.7  /  DBili  x   /  AST  37  /  ALT  44<H>  /  AlkPhos  30<L>  07-19    Creatinine Trend: 2.58<--, 2.43<--, 1.88<--, 1.79<--, 2.53<--  PT/INR - ( 19 Jul 2022 04:44 )   PT: 16.4 sec;   INR: 1.41 ratio         PTT - ( 19 Jul 2022 04:44 )  PTT:24.9 sec      RADIOLOGY & ADDITIONAL TESTS:      ASSESSMENT AND PLAN:  77 y/o M with PMH of T2DM, HTN, HLD, severe peripheral vascular disease, status post left right femoral-femoral bypass with right femoral-popliteal bypass graft in the past, history of femur fracture requiring multiple operations to fix the femur in 1990s, former smoker who quit in 1996 presenting with two episodes of hematemesis. MICU reconsulted for hypotension and Hgb 5.9  2/2 likely UGIB. S/p cardiac arrest on 7/19 prior to MICU transfer w/ ROSC.     #Neuro  - baseline A&Ox3  - noted to wake up purposefully following cardiac arrest  - continue propofol for sedation, wean as tolerated when stable to assess neurological function following cardiac arrest.    #Cardiovascular  PEA arrest likely 2/2 hemorrhagic shock in setting of GIB  - 7/19- Prior to transfer to MICU, pt became hypotensive SBP~30s, then subsequently went into cardiac arrest. Code blue was called, pt found to be in PEA, received 4 epi's, 2 amps of bicarb and ROSC was achieved after approximately ~12.5 minutes.  - maxxed on levo, amber, vaso  - s/p 5 uPRBC today, HB 5.3->8.6, then MTP  - cont to monitor CBC closely and tranfuse PRN for Hb >7  - s/p 1L LR and started on bicarb gtt @100cc/hr for acidosis pH 6.9  - EKG NSR w/ RBBB TWI in aVR, aVL. monitor trops.   - ECHO 7/18: 1. Systolic anterior motion of the mitral valve. 2. Reversal of the E-A  waves of the mitral inflow pattern is consistent with diastolic LV dysfunction. 3. Normal right ventricular size and function. There is a significant LVOT gradient. Doppler envelopes overlap with mitral signals making accurate gradients difficult to obtain. Grossly, there is at least mild to moderate obstruction with a peak gradient of 26 mm Hg and mean 12 mm Hg. These are likely underestimations.  - POCUS: Grossly normal LVSF. LV > RV. Hypertrophied interventricular septum with systolic anterior motion of the mitral valve. No pericardial effusion. IVC 0.6 cm with respiratory variation.     #Respiratory  intubated for airway protection s/p cardiac arrest 7/19  - vent settings 23/500/5/50%  - ABG - ( 19 Jul 2022 14:50 ) pH, Arterial: 6.90  pH, Blood: x     /  pCO2: 30    /  pO2: 195   / HCO3: 6     / Base Excess: -25.4 /  SaO2: 100.0   - CXR ordered for ETT placement confirmation    #GI/Nutrition  Upper GIB, hemorraghic shock   - presented w/ coffee ground emesis on 7/16, states had EGD/colonoscopy in 2015 which was normal  - s/p 6 uPRBC after MICU admssion initially, received total of 4u PRBC on medical floors  - OGT placed upon arrival to MICU, ~200cc bright red blood noted when placed to wall suction, GI called for EGD   - noted w/ active bleed at proximal duodenum on bedside scope s/p epi and clipping w/ some slowing of bleeding, also became hypotensive to 60's, MTP called ( s/p 2 units PRBC, 1 u PLT, 1 U FFP, 1 cryo)  - plan for IR embolization if remains stable  - cont protonix gtt  - monitor CBC Q6hrs, transfuse PRN to maintain HB >6  Shock liver  - in setting of hypoperfusion 2/2 anemia from GIB/cardiac arrest  - monitor synthetic function closely     #/Renal  MELECIO , likely multifactorial in setting of hypotension/GIB/anemia  - sCr 2.53 on admission. Unknown baseline, sCr 1.3 in 2015. Now uptrending ,  3.76 post arrest   - max dose on tripple pressors, unstable for HD at this time  - cmp q6hrs  Metabolic acidosis  - Due to lactate  - cont bicarb gtt, CRRT it stabilizes     #Skin  - no active issues    #ID  - no active issues    #Endocrine  - no active issues    #Hematologic/DVT ppx  - Hb 7.9 on admission, noted to be 5.3 on 7/19  -s/p MTP on admission to MICU 7/19  - SCDs in setting of GIB    #Ethics  FULL CODE    Lines:  R Axillary A- line (7/19- )  L IJ CVC ( 7/19-)  peripherals       Sal Boles MD MICU Accept Note    CHIEF COMPLAINT: UGIB    HPI / INTERVAL HISTORY:    75 y/o M with PMH of T2DM, HTN, HLD, severe peripheral vascular disease, status post left right femoral-femoral bypass with right femoral-popliteal bypass graft in the past, history of femur fracture requiring multiple operations to fix the femur in 1990s, former smoker who quit in 1996 presenting with two episodes of hematemesis. Pt reports this has never happened before, first episode was Friday night, then on Saturday morning. Vomitus was coffee ground, sometimes had bright red blood, was approximately 1 cup in volume per pt. Had a EGD/colonoscopy approximately 1 year ago, reports no abnormalities as far as he can remember. Reports no abdominal pain, no diarrhea, no bloody stool, no hemoptysis no hematochezia, melena, dysuria, hematuria. Denied fever, chills, CP, SOB, nausea, vomiting.     MICU reconsulted for hypotension to SBP 80s, Hgb 5.6,  c/f UGIB. Plan for EGD later today (7/19) with GI. Repeat Hgb was 5.3, ordered for bleeding scan to evaluate for source of GI bleed. Pt was also ordered for 2u PRBC. Pt accepted to MICU for close monitoring.  Prior to transfer to MICU, pt became hypotensive SBP~30s, then subsequently went into cardiac arrest. Code blue was called, pt found to be in PEA, received 4 epi's, 2 amps of bicarb and ROSC was achieved after approximately ~12.5 minutes.  Post arrest 1 additional u PRBC ordered. He was started propofol for sedation. Required pressor support w/ levo, amber gtt maxed. Also received 1 addition amp bicarb, fent 50x2.    ABG obtained which showed pH 6.9, pCO2 25, pO2 204, lactate 7.3. Of note observed to have woken up following arrest.  Upon transfer to MICU pt remained hypotensive ~SBP 50, vaso gtt was added. He also received additional bicarb pushes and started on bicarb gtt for pH 6.9.   POCUS showed collapsed IVC, preserved LV function. 1L LR bolus was ordered. OGT placed to suction was noted w/ bright red blood.   After receiving total of 4uPRBC Hb improved to 8.6.   GI came to bedside for EGD, active bleed noted in proximal duodenum, IR consulted emergently. MTP activated.      PAST MEDICAL & SURGICAL HISTORY:  Hypertension      Diabetes mellitus      Glaucoma      Fracture of femur  Had surgery (1997 )      Hyperlipidemia      S/P appendectomy      S/P femoral-popliteal bypass surgery      History of inferior vena caval filter placement  [place] Filter      Femoral fracture  Right &amp; had surgery ( 1997 )      Advance Directives:     HOME MEDICATIONS:      Allergies    No Known Allergies    Intolerances      OBJECTIVE:  ICU Vital Signs Last 24 Hrs  T(C): 36.4 (19 Jul 2022 05:25), Max: 36.8 (18 Jul 2022 09:25)  T(F): 97.5 (19 Jul 2022 05:25), Max: 98.2 (18 Jul 2022 09:25)  HR: 104 (19 Jul 2022 05:25) (90 - 115)  BP: 93/41 (19 Jul 2022 05:25) (83/61 - 136/68)  BP(mean): --  ABP: --  ABP(mean): --  RR: 19 (19 Jul 2022 05:25) (16 - 20)  SpO2: 100% (19 Jul 2022 05:25) (95% - 100%)    O2 Parameters below as of 19 Jul 2022 05:25  Patient On (Oxygen Delivery Method): nasal cannula,2        07-18 @ 07:01  -  07-19 @ 05:26  --------------------------------------------------------  IN: 0 mL / OUT: 250 mL / NET: -250 mL      CAPILLARY BLOOD GLUCOSE      POCT Blood Glucose.: 214 mg/dL (19 Jul 2022 01:51)      PHYSICAL EXAM:  GENERAL: NAD, sedated  HEAD:  Atraumatic, Normocephalic  EYES: EOMI, PERRLA, conjunctiva and sclera clear  ENT: Moist mucous membranes  NECK: Supple, No JVD  CHEST/LUNG: ETT in place, on mechanical ventialtion. Clear to auscultation bilaterally; No rales, rhonchi, wheezing, or rubs.   HEART: Regular rate and rhythm; No murmurs, rubs, or gallops  ABDOMEN: BSx4; Soft, nontender, nondistended  EXTREMITIES:  2+ Peripheral Pulses, brisk capillary refill. No clubbing, cyanosis, or edema  NERVOUS SYSTEM:  intubated and sedated  SKIN: No rashes or lesions    LINES:     HOSPITAL MEDICATIONS:  MEDICATIONS  (STANDING):  atorvastatin 80 milliGRAM(s) Oral at bedtime  dextrose 5%. 1000 milliLiter(s) (50 mL/Hr) IV Continuous <Continuous>  dextrose 5%. 1000 milliLiter(s) (100 mL/Hr) IV Continuous <Continuous>  dextrose 50% Injectable 25 Gram(s) IV Push once  dextrose 50% Injectable 12.5 Gram(s) IV Push once  dextrose 50% Injectable 25 Gram(s) IV Push once  glucagon  Injectable 1 milliGRAM(s) IntraMuscular once  insulin glargine Injectable (LANTUS) 4 Unit(s) SubCutaneous <User Schedule>  insulin lispro (ADMELOG) corrective regimen sliding scale   SubCutaneous every 6 hours  pantoprazole Infusion 8 mG/Hr (10 mL/Hr) IV Continuous <Continuous>  potassium phosphate / sodium phosphate Powder (PHOS-NaK) 1 Packet(s) Oral once  sodium chloride 0.9% Bolus 500 milliLiter(s) IV Bolus once  sodium chloride 0.9%. 1000 milliLiter(s) (75 mL/Hr) IV Continuous <Continuous>    MEDICATIONS  (PRN):  dextrose Oral Gel 15 Gram(s) Oral once PRN Blood Glucose LESS THAN 70 milliGRAM(s)/deciliter  ondansetron Injectable 4 milliGRAM(s) IV Push every 8 hours PRN Nausea and/or Vomiting  oxycodone    5 mG/acetaminophen 325 mG 1 Tablet(s) Oral every 8 hours PRN Severe Pain (7 - 10)      LABS:                        5.3    15.78 )-----------( 132      ( 19 Jul 2022 04:44 )             15.7     Hgb Trend: 5.3<--, 5.6<--, 8.7<--, 7.1<--, 7.8<--  07-19    145  |  113<H>  |  120<H>  ----------------------------<  163<H>  4.6   |  10<LL>  |  2.58<H>    Ca    8.5      19 Jul 2022 04:44  Phos  5.2     07-19  Mg     2.10     07-19    TPro  4.4<L>  /  Alb  2.9<L>  /  TBili  0.7  /  DBili  x   /  AST  37  /  ALT  44<H>  /  AlkPhos  30<L>  07-19    Creatinine Trend: 2.58<--, 2.43<--, 1.88<--, 1.79<--, 2.53<--  PT/INR - ( 19 Jul 2022 04:44 )   PT: 16.4 sec;   INR: 1.41 ratio         PTT - ( 19 Jul 2022 04:44 )  PTT:24.9 sec      RADIOLOGY & ADDITIONAL TESTS:      ASSESSMENT AND PLAN:  75 y/o M with PMH of T2DM, HTN, HLD, severe peripheral vascular disease, status post left right femoral-femoral bypass with right femoral-popliteal bypass graft in the past, history of femur fracture requiring multiple operations to fix the femur in 1990s, former smoker who quit in 1996 presenting with two episodes of hematemesis. MICU reconsulted for hypotension and Hgb 5.9  2/2 likely UGIB. S/p cardiac arrest on 7/19 prior to MICU transfer w/ ROSC.     #Neuro  - baseline A&Ox3  - noted to wake up purposefully following cardiac arrest  - continue propofol for sedation, wean as tolerated when stable to assess neurological function following cardiac arrest.    #Cardiovascular  PEA arrest likely 2/2 hemorrhagic shock in setting of GIB  - 7/19- Prior to transfer to MICU, pt became hypotensive SBP~30s, then subsequently went into cardiac arrest. Code blue was called, pt found to be in PEA, received 4 epi's, 2 amps of bicarb and ROSC was achieved after approximately ~12.5 minutes.  - maxxed on levo, amber, vaso  - s/p 5 uPRBC today, HB 5.3->8.6, then MTP  - cont to monitor CBC closely and tranfuse PRN for Hb >7  - s/p 1L LR and started on bicarb gtt @100cc/hr for acidosis pH 6.9  - EKG NSR w/ RBBB TWI in aVR, aVL. monitor trops.   - ECHO 7/18: 1. Systolic anterior motion of the mitral valve. 2. Reversal of the E-A  waves of the mitral inflow pattern is consistent with diastolic LV dysfunction. 3. Normal right ventricular size and function. There is a significant LVOT gradient. Doppler envelopes overlap with mitral signals making accurate gradients difficult to obtain. Grossly, there is at least mild to moderate obstruction with a peak gradient of 26 mm Hg and mean 12 mm Hg. These are likely underestimations.  - POCUS: Grossly normal LVSF. LV > RV. Hypertrophied interventricular septum with systolic anterior motion of the mitral valve. No pericardial effusion. IVC 0.6 cm with respiratory variation.     #Respiratory  intubated for airway protection s/p cardiac arrest 7/19  - vent settings 23/500/5/50%  - ABG - ( 19 Jul 2022 14:50 ) pH, Arterial: 6.90  pH, Blood: x     /  pCO2: 30    /  pO2: 195   / HCO3: 6     / Base Excess: -25.4 /  SaO2: 100.0   - CXR ordered for ETT placement confirmation    #GI/Nutrition  Upper GIB, hemorraghic shock   - presented w/ coffee ground emesis on 7/16, states had EGD/colonoscopy in 2015 which was normal  - s/p 6 uPRBC after MICU admssion initially, received total of 4u PRBC on medical floors  - OGT placed upon arrival to MICU, ~200cc bright red blood noted when placed to wall suction, GI called for EGD   - EGD: Normal esophagus. Clotted blood in the entire stomach. Active bleeding in duodenal sweep, likely GDA territory. Area injected with epi, one hemostatic clip placed and hemostatic spray applied with slowed bleeding and improved hemodynamics.  -became hypotensive to 60's during EGD. MTP  - plan for IR embolization if remains stable  - cont protonix gtt  - monitor CBC Q6hrs, transfuse PRN to maintain HB >6  Shock liver  - in setting of hypoperfusion 2/2 anemia from GIB/cardiac arrest  - monitor synthetic function closely     #/Renal  MELECIO , likely multifactorial in setting of hypotension/GIB/anemia  - sCr 2.53 on admission. Unknown baseline, sCr 1.3 in 2015. Now uptrending ,  3.76 post arrest   - max dose on tripple pressors, unstable for HD at this time  - cmp q6hrs  Metabolic acidosis  - Due to lactate  - cont bicarb gtt, CRRT it stabilizes     #Skin  - no active issues    #ID  - no active issues    #Endocrine  - no active issues    #Hematologic/DVT ppx  - Hb 7.9 on admission, noted to be 5.3 on 7/19  -s/p MTP on admission to MICU 7/19  - SCDs in setting of GIB    #Ethics  FULL CODE    Lines:  R Axillary A- line (7/19- )  L IJ CVC ( 7/19-)  peripherals       Sal Boles MD MICU Accept Note    CHIEF COMPLAINT: UGIB    HPI / INTERVAL HISTORY:    75 y/o M with PMH of T2DM, HTN, HLD, severe peripheral vascular disease, status post left right femoral-femoral bypass with right femoral-popliteal bypass graft in the past, history of femur fracture requiring multiple operations to fix the femur in 1990s, former smoker who quit in 1996 presenting with two episodes of hematemesis. Pt reports this has never happened before, first episode was Friday night, then on Saturday morning. Vomitus was coffee ground, sometimes had bright red blood, was approximately 1 cup in volume per pt. Had a EGD/colonoscopy approximately 1 year ago, reports no abnormalities as far as he can remember. Reports no abdominal pain, no diarrhea, no bloody stool, no hemoptysis no hematochezia, melena, dysuria, hematuria. Denied fever, chills, CP, SOB, nausea, vomiting.     MICU reconsulted for hypotension to SBP 80s, Hgb 5.6,  c/f UGIB. Plan for EGD later today (7/19) with GI. Repeat Hgb was 5.3, ordered for bleeding scan to evaluate for source of GI bleed. Pt was also ordered for 2u PRBC. Pt accepted to MICU for close monitoring.  Prior to transfer to MICU, pt became hypotensive SBP~30s, then subsequently went into cardiac arrest. Code blue was called, pt found to be in PEA, received 4 epi's, 2 amps of bicarb and ROSC was achieved after approximately ~12.5 minutes.  Post arrest 1 additional u PRBC ordered. He was started propofol for sedation. Required pressor support w/ levo, amber gtt maxed. Also received 1 addition amp bicarb, fent 50x2.    ABG obtained which showed pH 6.9, pCO2 25, pO2 204, lactate 7.3. Of note observed to have woken up following arrest.  Upon transfer to MICU pt remained hypotensive ~SBP 50, vaso gtt was added. He also received additional bicarb pushes and started on bicarb gtt for pH 6.9.   POCUS showed collapsed IVC, preserved LV function. 1L LR bolus was ordered. OGT placed to suction was noted w/ bright red blood.   After receiving total of 4uPRBC Hb improved to 8.6.   GI came to bedside for EGD, active bleed noted in proximal duodenum, IR consulted emergently. MTP activated.      PAST MEDICAL & SURGICAL HISTORY:  Hypertension      Diabetes mellitus      Glaucoma      Fracture of femur  Had surgery (1997 )      Hyperlipidemia      S/P appendectomy      S/P femoral-popliteal bypass surgery      History of inferior vena caval filter placement  [place] Filter      Femoral fracture  Right &amp; had surgery ( 1997 )      Advance Directives:     HOME MEDICATIONS:      Allergies    No Known Allergies    Intolerances      OBJECTIVE:  ICU Vital Signs Last 24 Hrs  T(C): 36.4 (19 Jul 2022 05:25), Max: 36.8 (18 Jul 2022 09:25)  T(F): 97.5 (19 Jul 2022 05:25), Max: 98.2 (18 Jul 2022 09:25)  HR: 104 (19 Jul 2022 05:25) (90 - 115)  BP: 93/41 (19 Jul 2022 05:25) (83/61 - 136/68)  BP(mean): --  ABP: --  ABP(mean): --  RR: 19 (19 Jul 2022 05:25) (16 - 20)  SpO2: 100% (19 Jul 2022 05:25) (95% - 100%)    O2 Parameters below as of 19 Jul 2022 05:25  Patient On (Oxygen Delivery Method): nasal cannula,2        07-18 @ 07:01  -  07-19 @ 05:26  --------------------------------------------------------  IN: 0 mL / OUT: 250 mL / NET: -250 mL      CAPILLARY BLOOD GLUCOSE      POCT Blood Glucose.: 214 mg/dL (19 Jul 2022 01:51)      PHYSICAL EXAM:  GENERAL: NAD, sedated  HEAD:  Atraumatic, Normocephalic  EYES: EOMI, PERRLA, conjunctiva and sclera clear  ENT: Moist mucous membranes  NECK: Supple, No JVD  CHEST/LUNG: ETT in place, on mechanical ventialtion. Clear to auscultation bilaterally; No rales, rhonchi, wheezing, or rubs.   HEART: Regular rate and rhythm; No murmurs, rubs, or gallops  ABDOMEN: BSx4; Soft, nontender, nondistended  EXTREMITIES:  2+ Peripheral Pulses, brisk capillary refill. No clubbing, cyanosis, or edema  NERVOUS SYSTEM:  intubated and sedated  SKIN: No rashes or lesions    LINES:     HOSPITAL MEDICATIONS:  MEDICATIONS  (STANDING):  atorvastatin 80 milliGRAM(s) Oral at bedtime  dextrose 5%. 1000 milliLiter(s) (50 mL/Hr) IV Continuous <Continuous>  dextrose 5%. 1000 milliLiter(s) (100 mL/Hr) IV Continuous <Continuous>  dextrose 50% Injectable 25 Gram(s) IV Push once  dextrose 50% Injectable 12.5 Gram(s) IV Push once  dextrose 50% Injectable 25 Gram(s) IV Push once  glucagon  Injectable 1 milliGRAM(s) IntraMuscular once  insulin glargine Injectable (LANTUS) 4 Unit(s) SubCutaneous <User Schedule>  insulin lispro (ADMELOG) corrective regimen sliding scale   SubCutaneous every 6 hours  pantoprazole Infusion 8 mG/Hr (10 mL/Hr) IV Continuous <Continuous>  potassium phosphate / sodium phosphate Powder (PHOS-NaK) 1 Packet(s) Oral once  sodium chloride 0.9% Bolus 500 milliLiter(s) IV Bolus once  sodium chloride 0.9%. 1000 milliLiter(s) (75 mL/Hr) IV Continuous <Continuous>    MEDICATIONS  (PRN):  dextrose Oral Gel 15 Gram(s) Oral once PRN Blood Glucose LESS THAN 70 milliGRAM(s)/deciliter  ondansetron Injectable 4 milliGRAM(s) IV Push every 8 hours PRN Nausea and/or Vomiting  oxycodone    5 mG/acetaminophen 325 mG 1 Tablet(s) Oral every 8 hours PRN Severe Pain (7 - 10)      LABS:                        5.3    15.78 )-----------( 132      ( 19 Jul 2022 04:44 )             15.7     Hgb Trend: 5.3<--, 5.6<--, 8.7<--, 7.1<--, 7.8<--  07-19    145  |  113<H>  |  120<H>  ----------------------------<  163<H>  4.6   |  10<LL>  |  2.58<H>    Ca    8.5      19 Jul 2022 04:44  Phos  5.2     07-19  Mg     2.10     07-19    TPro  4.4<L>  /  Alb  2.9<L>  /  TBili  0.7  /  DBili  x   /  AST  37  /  ALT  44<H>  /  AlkPhos  30<L>  07-19    Creatinine Trend: 2.58<--, 2.43<--, 1.88<--, 1.79<--, 2.53<--  PT/INR - ( 19 Jul 2022 04:44 )   PT: 16.4 sec;   INR: 1.41 ratio         PTT - ( 19 Jul 2022 04:44 )  PTT:24.9 sec      RADIOLOGY & ADDITIONAL TESTS:      ASSESSMENT AND PLAN:  75 y/o M with PMH of T2DM, HTN, HLD, severe peripheral vascular disease, status post left right femoral-femoral bypass with right femoral-popliteal bypass graft in the past, history of femur fracture requiring multiple operations to fix the femur in 1990s, former smoker who quit in 1996 presenting with two episodes of hematemesis. MICU reconsulted for hypotension and Hgb 5.9  2/2 likely UGIB. S/p cardiac arrest on 7/19 prior to MICU transfer w/ ROSC.     #Neuro  - baseline A&Ox3  - noted to wake up purposefully following cardiac arrest  - continue propofol for sedation, wean as tolerated when stable to assess neurological function following cardiac arrest.    #Cardiovascular  PEA arrest likely 2/2 hemorrhagic shock in setting of GIB  - 7/19- Prior to transfer to MICU, pt became hypotensive SBP~30s, then subsequently went into cardiac arrest. Code blue was called, pt found to be in PEA, received 4 epi's, 2 amps of bicarb and ROSC was achieved after approximately ~12.5 minutes.  - maxxed on levo, amber, vaso  - s/p 5 uPRBC today, HB 5.3->8.6, then MTP  - cont to monitor CBC closely and tranfuse PRN for Hb >7  - s/p 1L LR and started on bicarb gtt @100cc/hr for acidosis pH 6.9  - EKG NSR w/ RBBB TWI in aVR, aVL. monitor trops.   - ECHO 7/18: 1. Systolic anterior motion of the mitral valve. 2. Reversal of the E-A  waves of the mitral inflow pattern is consistent with diastolic LV dysfunction. 3. Normal right ventricular size and function. There is a significant LVOT gradient. Doppler envelopes overlap with mitral signals making accurate gradients difficult to obtain. Grossly, there is at least mild to moderate obstruction with a peak gradient of 26 mm Hg and mean 12 mm Hg. These are likely underestimations.  - POCUS: Grossly normal LVSF. LV > RV. Hypertrophied interventricular septum with systolic anterior motion of the mitral valve. No pericardial effusion. IVC 0.6 cm with respiratory variation.     #Respiratory  intubated for airway protection s/p cardiac arrest 7/19  - vent settings 23/500/5/50%  - ABG - ( 19 Jul 2022 14:50 ) pH, Arterial: 6.90  pH, Blood: x     /  pCO2: 30    /  pO2: 195   / HCO3: 6     / Base Excess: -25.4 /  SaO2: 100.0   - CXR ordered for ETT placement confirmation    #GI/Nutrition  Upper GIB, hemorraghic shock   - presented w/ coffee ground emesis on 7/16, states had EGD/colonoscopy in 2015 which was normal  - s/p 6 uPRBC after MICU admssion initially, received total of 4u PRBC on medical floors  - OGT placed upon arrival to MICU, ~200cc bright red blood noted when placed to wall suction, GI called for EGD   - EGD: Normal esophagus. Clotted blood in the entire stomach. Active bleeding in duodenal sweep, likely GDA territory. Area injected with epi, one hemostatic clip placed and hemostatic spray applied with slowed bleeding and improved hemodynamics.  -became hypotensive to 60's during EGD. MTP  - plan for IR embolization if remains stable  - cont protonix gtt  - monitor CBC Q6hrs, transfuse PRN to maintain HB >6  Shock liver  - in setting of hypoperfusion 2/2 anemia from GIB/cardiac arrest  - monitor synthetic function closely     #/Renal  MELECIO , likely multifactorial in setting of hypotension/GIB/anemia  - sCr 2.53 on admission. Unknown baseline, sCr 1.3 in 2015. Now uptrending ,  3.76 post arrest   - max dose on tripple pressors, unstable for HD at this time  - cmp q6hrs  Metabolic acidosis  - Due to elevated lactate  - cont bicarb gtt, CRRT if stabilizes     #Skin  - no active issues    #ID  - no active issues    #Endocrine  - no active issues    #Hematologic/DVT ppx  - Hb 7.9 on admission, noted to be 5.3 on 7/19  -s/p MTP on admission to MICU 7/19  - SCDs in setting of GIB    #Ethics  FULL CODE    Lines:  R Axillary A- line (7/19- )  L IJ CVC ( 7/19-)  peripherals MICU Accept Note    CHIEF COMPLAINT: UGIB    HPI / INTERVAL HISTORY:    75 y/o M with PMH of T2DM, HTN, HLD, severe peripheral vascular disease, status post left right femoral-femoral bypass with right femoral-popliteal bypass graft in the past, history of femur fracture requiring multiple operations to fix the femur in 1990s, former smoker who quit in 1996 presenting with two episodes of hematemesis. Pt reports this has never happened before, first episode was Friday night, then on Saturday morning. Vomitus was coffee ground, sometimes had bright red blood, was approximately 1 cup in volume per pt. Had a EGD/colonoscopy approximately 1 year ago, reports no abnormalities as far as he can remember. Reports no abdominal pain, no diarrhea, no bloody stool, no hemoptysis no hematochezia, melena, dysuria, hematuria. Denied fever, chills, CP, SOB, nausea, vomiting.     MICU reconsulted for hypotension to SBP 80s, Hgb 5.6,  c/f UGIB. Plan for EGD later today (7/19) with GI. Repeat Hgb was 5.3, ordered for bleeding scan to evaluate for source of GI bleed. Pt was also ordered for 2u PRBC. Pt accepted to MICU for close monitoring.  Prior to transfer to MICU, pt became hypotensive SBP~30s, then subsequently went into cardiac arrest. Code blue was called, pt found to be in PEA, received 4 epi's, 2 amps of bicarb and ROSC was achieved after approximately ~12.5 minutes.  Post arrest 1 additional u PRBC ordered. He was started propofol for sedation. Required pressor support w/ levo, amber gtt maxed. Also received 1 addition amp bicarb, fent 50x2.    ABG obtained which showed pH 6.9, pCO2 25, pO2 204, lactate 7.3. Of note observed to have woken up following arrest.  Upon transfer to MICU pt remained hypotensive ~SBP 50, vaso gtt was added. He also received additional bicarb pushes and started on bicarb gtt for pH 6.9.   POCUS showed collapsed IVC, preserved LV function. 1L LR bolus was ordered. OGT placed to suction was noted w/ bright red blood.   After receiving total of 4uPRBC Hb improved to 8.6.   GI came to bedside for EGD, active bleed noted in proximal duodenum, IR consulted emergently. MTP activated.      PAST MEDICAL & SURGICAL HISTORY:  Hypertension      Diabetes mellitus      Glaucoma      Fracture of femur  Had surgery (1997 )      Hyperlipidemia      S/P appendectomy      S/P femoral-popliteal bypass surgery      History of inferior vena caval filter placement  Marquez Filter      Femoral fracture  Right &amp; had surgery ( 1997 )      Advance Directives:     HOME MEDICATIONS:      Allergies    No Known Allergies    Intolerances      OBJECTIVE:  ICU Vital Signs Last 24 Hrs  T(C): 36.4 (19 Jul 2022 05:25), Max: 36.8 (18 Jul 2022 09:25)  T(F): 97.5 (19 Jul 2022 05:25), Max: 98.2 (18 Jul 2022 09:25)  HR: 104 (19 Jul 2022 05:25) (90 - 115)  BP: 93/41 (19 Jul 2022 05:25) (83/61 - 136/68)  BP(mean): --  ABP: --  ABP(mean): --  RR: 19 (19 Jul 2022 05:25) (16 - 20)  SpO2: 100% (19 Jul 2022 05:25) (95% - 100%)    O2 Parameters below as of 19 Jul 2022 05:25  Patient On (Oxygen Delivery Method): nasal cannula,2        07-18 @ 07:01  -  07-19 @ 05:26  --------------------------------------------------------  IN: 0 mL / OUT: 250 mL / NET: -250 mL      CAPILLARY BLOOD GLUCOSE      POCT Blood Glucose.: 214 mg/dL (19 Jul 2022 01:51)      PHYSICAL EXAM:  GENERAL: NAD, sedated  HEAD:  Atraumatic, Normocephalic  EYES: EOMI, PERRLA, conjunctiva and sclera clear  ENT: Moist mucous membranes  NECK: Supple, No JVD  CHEST/LUNG: ETT in place, on mechanical ventialtion. Clear to auscultation bilaterally; No rales, rhonchi, wheezing, or rubs.   HEART: Regular rate and rhythm; No murmurs, rubs, or gallops  ABDOMEN: BSx4; Soft, nontender, nondistended  EXTREMITIES:  2+ Peripheral Pulses, brisk capillary refill. No clubbing, cyanosis, or edema  NERVOUS SYSTEM:  intubated and sedated  SKIN: No rashes or lesions    LINES:     HOSPITAL MEDICATIONS:  MEDICATIONS  (STANDING):  atorvastatin 80 milliGRAM(s) Oral at bedtime  dextrose 5%. 1000 milliLiter(s) (50 mL/Hr) IV Continuous <Continuous>  dextrose 5%. 1000 milliLiter(s) (100 mL/Hr) IV Continuous <Continuous>  dextrose 50% Injectable 25 Gram(s) IV Push once  dextrose 50% Injectable 12.5 Gram(s) IV Push once  dextrose 50% Injectable 25 Gram(s) IV Push once  glucagon  Injectable 1 milliGRAM(s) IntraMuscular once  insulin glargine Injectable (LANTUS) 4 Unit(s) SubCutaneous <User Schedule>  insulin lispro (ADMELOG) corrective regimen sliding scale   SubCutaneous every 6 hours  pantoprazole Infusion 8 mG/Hr (10 mL/Hr) IV Continuous <Continuous>  potassium phosphate / sodium phosphate Powder (PHOS-NaK) 1 Packet(s) Oral once  sodium chloride 0.9% Bolus 500 milliLiter(s) IV Bolus once  sodium chloride 0.9%. 1000 milliLiter(s) (75 mL/Hr) IV Continuous <Continuous>    MEDICATIONS  (PRN):  dextrose Oral Gel 15 Gram(s) Oral once PRN Blood Glucose LESS THAN 70 milliGRAM(s)/deciliter  ondansetron Injectable 4 milliGRAM(s) IV Push every 8 hours PRN Nausea and/or Vomiting  oxycodone    5 mG/acetaminophen 325 mG 1 Tablet(s) Oral every 8 hours PRN Severe Pain (7 - 10)      LABS:                        5.3    15.78 )-----------( 132      ( 19 Jul 2022 04:44 )             15.7     Hgb Trend: 5.3<--, 5.6<--, 8.7<--, 7.1<--, 7.8<--  07-19    145  |  113<H>  |  120<H>  ----------------------------<  163<H>  4.6   |  10<LL>  |  2.58<H>    Ca    8.5      19 Jul 2022 04:44  Phos  5.2     07-19  Mg     2.10     07-19    TPro  4.4<L>  /  Alb  2.9<L>  /  TBili  0.7  /  DBili  x   /  AST  37  /  ALT  44<H>  /  AlkPhos  30<L>  07-19    Creatinine Trend: 2.58<--, 2.43<--, 1.88<--, 1.79<--, 2.53<--  PT/INR - ( 19 Jul 2022 04:44 )   PT: 16.4 sec;   INR: 1.41 ratio         PTT - ( 19 Jul 2022 04:44 )  PTT:24.9 sec      RADIOLOGY & ADDITIONAL TESTS:      ASSESSMENT AND PLAN:  75 y/o M with PMH of T2DM, HTN, HLD, severe peripheral vascular disease, status post left right femoral-femoral bypass with right femoral-popliteal bypass graft in the past, history of femur fracture requiring multiple operations to fix the femur in 1990s, former smoker who quit in 1996 presenting with two episodes of hematemesis. MICU reconsulted for hypotension and Hgb 5.9  2/2 likely UGIB. S/p cardiac arrest on 7/19 prior to MICU transfer w/ ROSC.     #Neuro  - baseline A&Ox3  - noted to wake up purposefully following cardiac arrest  - continue propofol for sedation, wean as tolerated when stable to assess neurological function following cardiac arrest.    #Cardiovascular  PEA arrest likely 2/2 hemorrhagic shock in setting of GIB  - 7/19- Prior to transfer to MICU, pt became hypotensive SBP~30s, then subsequently went into cardiac arrest. Code blue was called, pt found to be in PEA, received 4 epi's, 2 amps of bicarb and ROSC was achieved after approximately ~12.5 minutes.  - maxxed on levo, amber, vaso  - s/p 5 uPRBC today, HB 5.3->8.6, then MTP  - cont to monitor CBC closely and tranfuse PRN for Hb >7  - s/p 1L LR and started on bicarb gtt @100cc/hr for acidosis pH 6.9  - EKG NSR w/ RBBB TWI in aVR, aVL. monitor trops.   - ECHO 7/18: 1. Systolic anterior motion of the mitral valve. 2. Reversal of the E-A  waves of the mitral inflow pattern is consistent with diastolic LV dysfunction. 3. Normal right ventricular size and function. There is a significant LVOT gradient. Doppler envelopes overlap with mitral signals making accurate gradients difficult to obtain. Grossly, there is at least mild to moderate obstruction with a peak gradient of 26 mm Hg and mean 12 mm Hg. These are likely underestimations.  - POCUS: Grossly normal LVSF. LV > RV. Hypertrophied interventricular septum with systolic anterior motion of the mitral valve. No pericardial effusion. IVC 0.6 cm with respiratory variation.     #Respiratory  intubated for airway protection s/p cardiac arrest 7/19  - vent settings 23/500/5/50%  - ABG - ( 19 Jul 2022 14:50 ) pH, Arterial: 6.90  pH, Blood: x     /  pCO2: 30    /  pO2: 195   / HCO3: 6     / Base Excess: -25.4 /  SaO2: 100.0   - CXR ordered for ETT placement confirmation    #GI/Nutrition  Upper GIB, hemorraghic shock   - presented w/ coffee ground emesis on 7/16, states had EGD/colonoscopy in 2015 which was normal  - s/p 6 uPRBC after MICU admssion initially, received total of 4u PRBC on medical floors  - OGT placed upon arrival to MICU, ~200cc bright red blood noted when placed to wall suction, GI called for EGD   - EGD: Normal esophagus. Clotted blood in the entire stomach. Active bleeding in duodenal sweep, likely GDA territory. Area injected with epi, one hemostatic clip placed and hemostatic spray applied with slowed bleeding and improved hemodynamics.  -became hypotensive to 60's during EGD. MTP  - plan for IR embolization if remains stable  - cont protonix gtt  - monitor CBC Q6hrs, transfuse PRN to maintain HB >6  Shock liver  - in setting of hypoperfusion 2/2 anemia from GIB/cardiac arrest  - monitor synthetic function closely     #/Renal  MELECIO , likely multifactorial in setting of hypotension/GIB/anemia  - sCr 2.53 on admission. Unknown baseline, sCr 1.3 in 2015. Now uptrending ,  3.76 post arrest   - max dose on tripple pressors, unstable for HD at this time  - cmp q6hrs  Metabolic acidosis  - Due to elevated lactate  - cont bicarb gtt, CRRT if stabilizes     #Skin  - no active issues    #ID  - no active issues    #Endocrine  - no active issues    #Hematologic/DVT ppx  - Hb 7.9 on admission, noted to be 5.3 on 7/19  -s/p MTP on admission to MICU 7/19  - SCDs in setting of GIB    #Ethics  FULL CODE    Lines:  R Axillary A- line (7/19- )  L IJ CVC ( 7/19-)  peripherals        Attending Attestation:  Patient seen and examined with ACP.  Agree with above except as noted.    Pt is a 76M with PMHx NIDDMII, severe PVD s/p hx bilateral femoral-femoral bypass, and HTN/HLD initially presenting to [facility] on 7/16/22 with acute onset hematemesis admitted to medicine service with hospital course c/b hemorrhagic shock 2/2 UGIB with PEA arrest on medicine floor s/p ROSC (~12 minutes, 7/19/22) followed by MICU transfer.    Pt presenting to MICU following PEA arrest with severe refractory hemorrhagic shock requiring 3 vasopressors at maximum dose in the setting of active UGI hemorrhage at proximal duodenum further c/b dynamic LVOT obstruction and severe true volume depletion further c/b lactic acidosis and multi-organ failure with shock liver and ARF. Too unstable for CRRT, bicarb drip started. Pt aggressively resuscitated with blood products and IVF, MTP called during emergent bedside EGD with slight improvement in BP. Active bleed temporarily tamponaded by GI team with plan for urgent IR transfer for GDA embolization.     This patient is critically ill and required frequent bedside visits with therapy change.  Total critical care time spent was 45 minutes. This time excludes time spent on separate procedures and time spent teaching.    Elana Stanford MD  Attending  Pulmonary and Critical Care Medicine.

## 2022-07-19 NOTE — PROVIDER CONTACT NOTE (CRITICAL VALUE NOTIFICATION) - RECOMMENDATIONS
Followup with possible consult for MICU.  Transfuse.
Followup with possible consult for MICU.  Transfuse.

## 2022-07-19 NOTE — CONSULT NOTE ADULT - ASSESSMENT
76m with GIB of unknown source, hemodynamically stable with critically low h/h. Suspicion for SBO low at this time and unlikely the cause of GIB.    -recommend transfer to MICU for closer hemodynamic monitoring  -transfuse blood prn, close monitoring of H/H  -CTA of ab/pel with IV con to best localize bleed, CT will also eval for SBO  -GI to evaluate for source of GIB  -no acute surgical intervention at this time  -will follow    Discussed with Dr. Josef LOCO team surgery  [id number]

## 2022-07-19 NOTE — PROVIDER CONTACT NOTE (CHANGE IN STATUS NOTIFICATION) - ACTION/TREATMENT ORDERED:
Hypothermia blanket - still awaiting MICU bed, proceed with transfusion
Bolus, additional labs, MICU consult

## 2022-07-19 NOTE — PRE PROCEDURE NOTE - PRE PROCEDURE EVALUATION
Interventional Radiology    HPI:  76 y.o male with DM, HTN, HLD, PVD s/p left right femoral-femoral bypass with right femoral-popliteal bypass graft, presents for two episodes of coffee ground emesis.   IR consulted for empiric GDA embolization after hemorrhagic shock in ICU with massive transfusion protocol, GI localized bleed to proximal duodenum but unable to find exact source or achieve hemostasis, clip placed.     .      Allergies:   Medications (Abx/Cardiac/Anticoagulation/Blood Products)    furosemide   Injectable: 40 milliGRAM(s) IV Push (07-18 @ 08:40)      Home Medications  amlodipine-valsartan 5 mg-320 mg oral tablet: 1 tab(s) orally once a day  clopidogrel 75 mg oral tablet: 1 tab(s) orally once a day  Combigan 0.2%-0.5% ophthalmic solution: 1 drop(s) to each affected eye every 12 hours  dorzolamide 2% ophthalmic solution: 1 drop(s) to each affected eye 2 times a day  glimepiride 1 mg oral tablet: 1 tab(s) orally once a day  linagliptin 5 mg oral tablet: 1 tab(s) orally once a day  Lumigan 0.01% ophthalmic solution: 1 drop(s) to each affected eye once a day  Metanx oral capsule: 1 cap(s) orally 2 times a day  metFORMIN-pioglitazone 850 mg-15 mg oral tablet: 1 tab(s) orally 2 times a day  Nasonex 50 mcg/inh nasal spray: 2 spray(s) nasal once a day  Onglyza 5 mg oral tablet: 1 tab(s) orally once a day  oxycodone-acetaminophen 5 mg-325 mg oral tablet: 1 tab(s) orally 3 times a day prn breakthrough pain MDD:3  pentoxifylline 400 mg oral tablet, extended release: 1 tab(s) orally 3 times a day  pioglitazone-metformin 15 mg-850 mg oral tablet: 1 tab(s) orally 2 times a day  rosuvastatin 20 mg oral tablet: 1 tab(s) orally once a day      Data:  175  67.9  T(C): 33.9  HR: 115  BP: 68/38  RR: 25  SpO2: 96%    Exam  General: No acute distress  Chest: Non labored breathing  Abdomen: Non-distended  Extremities: No swelling, warm    -WBC 6.32 / HgB 10.2 / Hct 31.4 / Plt 110  -Na 153 / Cl 109 /  / Glucose 227  -K 7.0 / CO2 <7 / Cr 3.33  -ALT -- / Alk Phos -- / T.Bili --  -INR1.68    Imaging:     Plan: 76 y.o male with DM, HTN, HLD, PVD s/p left right femoral-femoral bypass with right femoral-popliteal bypass graft, presents for two episodes of coffee ground emesis.   IR consulted for empiric GDA embolization after hemorrhagic shock in ICU with massive transfusion protocol, GI localized bleed to proximal duodenum but unable to find exact source or achieve hemostasis, clip placed.     -Risks/Benefits/alternatives explained with the patient and/or healthcare proxy and witnessed informed consent obtained from family in ICU.

## 2022-07-19 NOTE — CHART NOTE - NSCHARTNOTEFT_GEN_A_CORE
GI following for coffee ground emesis. Overnight patient w/ recurrent vomiting, hypotension, and drop in Hg c/f UGIB.     Will plan for EGD today  Please transfuse 2u pRBC and f/u CBC  Pantoprazole drip 8mg/hr  Reglan 10mg IV x 1  Plan for EGD in bedside once patient intubated     Willy Vela, PGY6  GI Fellow

## 2022-07-19 NOTE — PRE PROCEDURE NOTE - PRE PROCEDURE EVALUATION
IR Pre-Procedure Note    Patient Age:   76y    Patient Gender:   Male    Procedure (including site / side if known):    Diagnosis / Indication: Patient is a 76y old  Male who presents with a chief complaint of Upper GI bleed (19 Jul 2022 15:40)      Interventional Radiology Attending Physician: Dr. Ivey    Ordering Attending Physician: Dr. Stanford    PAST MEDICAL & SURGICAL HISTORY:  Hypertension      Diabetes mellitus      Glaucoma      Fracture of femur  Had surgery (1997 )      Hyperlipidemia      S/P appendectomy      S/P femoral-popliteal bypass surgery      History of inferior vena caval filter placement  Mark Filter      Femoral fracture  Right &amp; had surgery ( 1997 )           Pertinent Labs:   CBC Full  -  ( 19 Jul 2022 14:50 )  WBC Count : 11.00 K/uL  RBC Count : 2.82 M/uL  Hemoglobin : 8.6 g/dL  Hematocrit : 28.2 %  Platelet Count - Automated : 85 K/uL  Mean Cell Volume : 100.0 fL  Mean Cell Hemoglobin : 30.5 pg  Mean Cell Hemoglobin Concentration : 30.5 gm/dL  Auto Neutrophil # : 7.00 K/uL  Auto Lymphocyte # : 2.46 K/uL  Auto Monocyte # : 0.84 K/uL  Auto Eosinophil # : 0.03 K/uL  Auto Basophil # : 0.07 K/uL  Auto Neutrophil % : 63.6 %  Auto Lymphocyte % : 22.4 %  Auto Monocyte % : 7.6 %  Auto Eosinophil % : 0.3 %  Auto Basophil % : 0.6 %    07-19    156<H>  |  113<H>  |  124<H>  ----------------------------<  128<H>  7.3<HH>   |  <7<LL>  |  3.76<H>    Ca    8.2<L>      19 Jul 2022 14:50  Phos  16.9     07-19  Mg     3.10     07-19    TPro  3.7<L>  /  Alb  2.2<L>  /  TBili  0.7  /  DBili  x   /  AST  2489<H>  /  ALT  3416<H>  /  AlkPhos  44  07-19    PT/INR - ( 19 Jul 2022 14:50 )   PT: 21.3 sec;   INR: 1.83 ratio         PTT - ( 19 Jul 2022 14:50 )  PTT:43.6 sec    Patient / Family aware of procedure:   [ x ] Y   [  ] N       IR Pre-Procedure Note    Patient Age:   76y    Patient Gender:   Male    Procedure (including site / side if known): IR embolization for GI bleed    Diagnosis / Indication: Patient is a 76y old  Male who presents with a chief complaint of Upper GI bleed (19 Jul 2022 15:40)      Interventional Radiology Attending Physician: Dr. Ivey    Ordering Attending Physician: Dr. Stanford    PAST MEDICAL & SURGICAL HISTORY:  Hypertension      Diabetes mellitus      Glaucoma      Fracture of femur  Had surgery (1997 )      Hyperlipidemia      S/P appendectomy      S/P femoral-popliteal bypass surgery      History of inferior vena caval filter placement  Mark Filter      Femoral fracture  Right &amp; had surgery ( 1997 )           Pertinent Labs:   CBC Full  -  ( 19 Jul 2022 14:50 )  WBC Count : 11.00 K/uL  RBC Count : 2.82 M/uL  Hemoglobin : 8.6 g/dL  Hematocrit : 28.2 %  Platelet Count - Automated : 85 K/uL  Mean Cell Volume : 100.0 fL  Mean Cell Hemoglobin : 30.5 pg  Mean Cell Hemoglobin Concentration : 30.5 gm/dL  Auto Neutrophil # : 7.00 K/uL  Auto Lymphocyte # : 2.46 K/uL  Auto Monocyte # : 0.84 K/uL  Auto Eosinophil # : 0.03 K/uL  Auto Basophil # : 0.07 K/uL  Auto Neutrophil % : 63.6 %  Auto Lymphocyte % : 22.4 %  Auto Monocyte % : 7.6 %  Auto Eosinophil % : 0.3 %  Auto Basophil % : 0.6 %    07-19    156<H>  |  113<H>  |  124<H>  ----------------------------<  128<H>  7.3<HH>   |  <7<LL>  |  3.76<H>    Ca    8.2<L>      19 Jul 2022 14:50  Phos  16.9     07-19  Mg     3.10     07-19    TPro  3.7<L>  /  Alb  2.2<L>  /  TBili  0.7  /  DBili  x   /  AST  2489<H>  /  ALT  3416<H>  /  AlkPhos  44  07-19    PT/INR - ( 19 Jul 2022 14:50 )   PT: 21.3 sec;   INR: 1.83 ratio         PTT - ( 19 Jul 2022 14:50 )  PTT:43.6 sec    Patient / Family aware of procedure:   [ x ] Y   [  ] N

## 2022-07-19 NOTE — DIETITIAN INITIAL EVALUATION ADULT - OTHER INFO
75 y/o male with DM, HTN, HLD, PVD on dual antiplatelet therapy who presents for two episodes of coffee ground emesis, found to be hypotensive and Hgb 7.9 from 14 2015, concerning for GI bleed. GI consulted, was planned for EGD, due to hypotension SBP 80s with Hgb 5.6, HCO3 10, lactate 7, patient was accepted to MICU for urgent endoscopy. Patient was given 2u pRBC in interim, no repeat labs were drawn. During MICU transport, patient became hypotensive to SBP ~30s, and  then went into PEA arrest likely 2/2 to hypovolemia and/or acidosis. Pt now intubated and initiated on sedation and remains NPO. Receiving IVF of D5W + LR with IVPB fluids. FS over past 24 hrs 107 - 214 mg/dl with Lantus and Admelog insulins ordered for coverage. If unable to provide enteral nutrition given pt's GIB, consider alternate means of nutrition support as pt now NPO with 1 day of Clear Liquids over the past 4 days of admission. RDN services to remain available as needed.

## 2022-07-19 NOTE — PROGRESS NOTE ADULT - SUBJECTIVE AND OBJECTIVE BOX
Patient is a 76y old  Male who presents with a chief complaint of Upper GI bleed (19 Jul 2022 15:40)      INTERVAL HPI/OVERNIGHT EVENTS: transfer to MICU   T(C): 35.1 (07-19-22 @ 23:00), Max: 36.5 (07-19-22 @ 00:00)  HR: 118 (07-19-22 @ 23:00) (100 - 118)  BP: 68/38 (07-19-22 @ 13:00) (68/38 - 107/52)  RR: 25 (07-19-22 @ 16:00) (18 - 28)  SpO2: 92% (07-19-22 @ 23:00) (86% - 100%)  Wt(kg): --  I&O's Summary    18 Jul 2022 07:01  -  19 Jul 2022 07:00  --------------------------------------------------------  IN: 0 mL / OUT: 250 mL / NET: -250 mL    19 Jul 2022 07:01  -  19 Jul 2022 23:30  --------------------------------------------------------  IN: 6165.4 mL / OUT: 300 mL / NET: 5865.4 mL        PAST MEDICAL & SURGICAL HISTORY:  Hypertension      Diabetes mellitus      Glaucoma      Fracture of femur  Had surgery (1997 )      Hyperlipidemia      S/P appendectomy      S/P femoral-popliteal bypass surgery      History of inferior vena caval filter placement  Mark Filter      Femoral fracture  Right &amp; had surgery ( 1997 )          SOCIAL HISTORY  Alcohol:  Tobacco:  Illicit substance use:    FAMILY HISTORY:    REVIEW OF SYSTEMS:  CONSTITUTIONAL: No fever, weight loss, or fatigue  EYES: No eye pain, visual disturbances, or discharge  ENMT:  No difficulty hearing, tinnitus, vertigo; No sinus or throat pain  NECK: No pain or stiffness  RESPIRATORY: No cough, wheezing, chills or hemoptysis; No shortness of breath  CARDIOVASCULAR: No chest pain, palpitations, dizziness, or leg swelling  GASTROINTESTINAL: No abdominal or epigastric pain. No nausea, vomiting, or hematemesis; No diarrhea or constipation. No melena or hematochezia.  GENITOURINARY: No dysuria, frequency, hematuria, or incontinence  NEUROLOGICAL: No headaches, memory loss, loss of strength, numbness, or tremors  SKIN: No itching, burning, rashes, or lesions   LYMPH NODES: No enlarged glands  ENDOCRINE: No heat or cold intolerance; No hair loss  MUSCULOSKELETAL: No joint pain or swelling; No muscle, back, or extremity pain  PSYCHIATRIC: No depression, anxiety, mood swings, or difficulty sleeping  HEME/LYMPH: No easy bruising, or bleeding gums  ALLERY AND IMMUNOLOGIC: No hives or eczema    RADIOLOGY & ADDITIONAL TESTS:    Imaging Personally Reviewed:  [ ] YES  [ ] NO    Consultant(s) Notes Reviewed:  [ ] YES  [ ] NO    PHYSICAL EXAM:  GENERAL: NAD, well-groomed, well-developed  HEAD:  Atraumatic, Normocephalic  EYES: EOMI, PERRLA, conjunctiva and sclera clear  ENMT: No tonsillar erythema, exudates, or enlargement; Moist mucous membranes, Good dentition, No lesions  NECK: Supple, No JVD, Normal thyroid  NERVOUS SYSTEM:  Alert & Oriented X3, Good concentration; Motor Strength 5/5 B/L upper and lower extremities; DTRs 2+ intact and symmetric  CHEST/LUNG: Clear to percussion bilaterally; No rales, rhonchi, wheezing, or rubs  HEART: Regular rate and rhythm; No murmurs, rubs, or gallops  ABDOMEN: Soft, Nontender, Nondistended; Bowel sounds present  EXTREMITIES:  2+ Peripheral Pulses, No clubbing, cyanosis, or edema  LYMPH: No lymphadenopathy noted  SKIN: No rashes or lesions    LABS:                        10.2   6.32  )-----------( 110      ( 19 Jul 2022 18:30 )             31.4     07-19    153<H>  |  109<H>  |  111<H>  ----------------------------<  227<H>  7.0<HH>   |  <7<LL>  |  3.33<H>    Ca    8.4      19 Jul 2022 18:30  Phos  15.3     07-19  Mg     2.80     07-19    TPro  3.7<L>  /  Alb  2.2<L>  /  TBili  0.7  /  DBili  x   /  AST  2489<H>  /  ALT  3416<H>  /  AlkPhos  44  07-19    PT/INR - ( 19 Jul 2022 18:30 )   PT: 19.6 sec;   INR: 1.68 ratio         PTT - ( 19 Jul 2022 14:50 )  PTT:43.6 sec    CAPILLARY BLOOD GLUCOSE      POCT Blood Glucose.: 107 mg/dL (19 Jul 2022 14:23)  POCT Blood Glucose.: 125 mg/dL (19 Jul 2022 12:38)  POCT Blood Glucose.: 165 mg/dL (19 Jul 2022 07:05)  POCT Blood Glucose.: 214 mg/dL (19 Jul 2022 01:51)    ABG - ( 19 Jul 2022 22:12 )  pH, Arterial: 7.05  pH, Blood: x     /  pCO2: 38    /  pO2: 91    / HCO3: 10    / Base Excess: -18.8 /  SaO2: 98.5                    MEDICATIONS  (STANDING):  atorvastatin 80 milliGRAM(s) Oral at bedtime  chlorhexidine 0.12% Liquid 15 milliLiter(s) Oral Mucosa every 12 hours  dextrose 5%. 1000 milliLiter(s) (50 mL/Hr) IV Continuous <Continuous>  dextrose 5%. 1000 milliLiter(s) (100 mL/Hr) IV Continuous <Continuous>  dextrose 50% Injectable 25 Gram(s) IV Push once  dextrose 50% Injectable 12.5 Gram(s) IV Push once  dextrose 50% Injectable 25 Gram(s) IV Push once  glucagon  Injectable 1 milliGRAM(s) IntraMuscular once  insulin glargine Injectable (LANTUS) 4 Unit(s) SubCutaneous <User Schedule>  insulin lispro (ADMELOG) corrective regimen sliding scale   SubCutaneous every 6 hours  norepinephrine Infusion 0.05 MICROgram(s)/kG/Min (3.18 mL/Hr) IV Continuous <Continuous>  pantoprazole Infusion 8 mG/Hr (10 mL/Hr) IV Continuous <Continuous>  phenylephrine    Infusion 0.5 MICROgram(s)/kG/Min (6.3 mL/Hr) IV Continuous <Continuous>  potassium phosphate / sodium phosphate Powder (PHOS-NaK) 1 Packet(s) Oral once  sodium bicarbonate  Infusion 0.223 mEq/kG/Hr (100 mL/Hr) IV Continuous <Continuous>  sodium bicarbonate  Injectable 50 milliEquivalent(s) IV Push once  vasopressin Infusion 0.04 Unit(s)/Min (2.4 mL/Hr) IV Continuous <Continuous>    MEDICATIONS  (PRN):  dextrose Oral Gel 15 Gram(s) Oral once PRN Blood Glucose LESS THAN 70 milliGRAM(s)/deciliter  ondansetron Injectable 4 milliGRAM(s) IV Push every 8 hours PRN Nausea and/or Vomiting      Care Discussed with Consultants/Other Providers [ ] YES  [ ] NO

## 2022-07-19 NOTE — PROCEDURE NOTE - NSICDXPROCEDURE_GEN_ALL_CORE_FT
PROCEDURES:  Insertion of triple lumen catheter with imaging guidance 19-Jul-2022 16:34:03  Elana Stanford  Insertion of arterial line with imaging guidance 19-Jul-2022 16:34:19  Elana Stanford

## 2022-07-19 NOTE — CONSULT NOTE ADULT - ASSESSMENT
75 y/o M with PMH of T2DM, HTN, HLD, severe peripheral vascular disease, status post left right femoral-femoral bypass with right femoral-popliteal bypass graft in the past, history of femur fracture requiring multiple operations to fix the femur in 1990s, former smoker who quit in 1996 presenting with two episodes of hematemesis. MICU reconsulted for hypotension and Hgb 5.9  2/2 likely UGIB.      #Hypotension, anemia  -Pt had SBP in 80s, repeat at bedside was 112/40s, MAP 62. Last received 1U pRBC at 9 AM on 7/18.  -Last Hgb was 5.9 down from 8.7, overall downtrended, received 4U pRBC so far since admission as well as 1U platelets and 1U plasma  -NPO after midnight for scope with GI later today  -Would repeat CBC and transfuse Hgb < 7  -If persistently anemic or hemodynamically unstable would order CTA of abdomen/pelvis 75 y/o M with PMH of T2DM, HTN, HLD, severe peripheral vascular disease, status post left right femoral-femoral bypass with right femoral-popliteal bypass graft in the past, history of femur fracture requiring multiple operations to fix the femur in 1990s, former smoker who quit in 1996 presenting with two episodes of hematemesis. MICU reconsulted for hypotension and Hgb 5.9  2/2 likely UGIB.      #Hypotension, anemia  -Pt had SBP in 80s, repeat at bedside was 112/40s, MAP 62. Last received 1U pRBC at 9 AM on 7/18.  -Last Hgb was 5.9 down from 8.7, overall downtrended, received 4U pRBC so far since admission as well as 1U platelets and 1U plasma  -NPO after midnight for scope with GI later today  -Would repeat CBC and transfuse Hgb < 7  -If persistently anemic or hemodynamically unstable would order CTA of abdomen/pelvis (bleeding scan)    We will accept the patient to MICU

## 2022-07-19 NOTE — CHART NOTE - NSCHARTNOTEFT_GEN_A_CORE
GI following for upper GI bleed. Planned for EGD today in MICU due to worsening anemia and hypotension overnight.   During the day prior to MICU transfer patient had cardiac arrest, intubated, transferred to MICU  OGT placed in MICU with bright red blood. Received total of 5u pRBC  Repeat labs post arrest in MICU w/ Hg 8.6 and BMP w/ severe electrolyte derangements (K7.3, Na 154). Discussed at length with MICU attending and patient's family; patient at high risk for procedural complication including possible cardiac arrest during the procedure due to electrolyte derangements; however given ongoing UGIB, decision was made that benefits outweigh the risks.   Will plan for EGD    Discussed w/ GI Attending Dr. Joselyn Vela and MICU attending.     Willy Vela, PGY6  GI Fellow GI following for upper GI bleed. Planned for EGD today in MICU due to worsening anemia and hypotension overnight.   During the day while awaiting transfer to MICU, patient had cardiac arrest, intubated, transferred to MICU.  OGT placed in MICU with bright red blood. Received total of 5u pRBC.  Repeat labs post arrest in MICU w/ Hg 8.6 and BMP w/ severe electrolyte derangements (K7.3, Na 154). Discussed at length with MICU attending and patient's family; patient at high risk for procedural complication including possible cardiac arrest during the procedure due to electrolyte derangements and multiorgan failure; however given ongoing UGIB, decision was made that benefits outweigh the risks.   Will plan for EGD at bedside this afternoon.     Discussed w/ GI Attending Dr. Joselyn Vela and MICU attending, Dr. Elana Stanford. Additionally, patient's daughters (one in person and over phone) were updated regarding events and agree to proceed with EGD as well. Formal consent for EGD had been obtained from patient this AM.     Willy Veal, PGY6  GI Fellow    Joselyn Vela MD  Attending, Division of Gastroenterology

## 2022-07-19 NOTE — PROGRESS NOTE ADULT - SUBJECTIVE AND OBJECTIVE BOX
CARDIOLOGY FOLLOW UP - Dr. Nix  Date of Service: 7/19/2022  CC: events noted, EGD planned    Review of Systems:  Constitutional: No fever, weight loss, or fatigue  Respiratory: No cough, wheezing, or hemoptysis, no shortness of breath  Cardiovascular: No chest pain, palpitations, passing out, dizziness, or leg swelling  Gastrointestinal: No abd or epigastric pain. No nausea, vomiting, or hematemesis; no diarrhea or consiptaiton, no melena or hematochezia  Vascular: No edema     TELEMETRY:    PHYSICAL EXAM:  T(C): 35.1 (07-19-22 @ 10:02), Max: 36.6 (07-18-22 @ 13:44)  HR: 103 (07-19-22 @ 10:02) (90 - 115)  BP: 99/48 (07-19-22 @ 10:02) (86/59 - 120/60)  RR: 18 (07-19-22 @ 10:02) (18 - 20)  SpO2: 100% (07-19-22 @ 10:02) (95% - 100%)  Wt(kg): --  I&O's Summary    18 Jul 2022 07:01  -  19 Jul 2022 07:00  --------------------------------------------------------  IN: 0 mL / OUT: 250 mL / NET: -250 mL        Appearance: Normal	  Cardiovascular: Normal S1 S2,RRR, No JVD, No murmurs  Respiratory: Lungs clear to auscultation	  Gastrointestinal:  Soft, Non-tender, + BS	  Extremities: Normal range of motion, No clubbing, cyanosis or edema  Vascular: Peripheral pulses palpable 2+ bilaterally       Home Medications:  amlodipine-valsartan 5 mg-320 mg oral tablet: 1 tab(s) orally once a day (17 Jul 2022 09:56)  clopidogrel 75 mg oral tablet: 1 tab(s) orally once a day (17 Jul 2022 09:56)  Combigan 0.2%-0.5% ophthalmic solution: 1 drop(s) to each affected eye every 12 hours (17 Jul 2022 09:56)  dorzolamide 2% ophthalmic solution: 1 drop(s) to each affected eye 2 times a day (17 Jul 2022 09:56)  glimepiride 1 mg oral tablet: 1 tab(s) orally once a day (17 Jul 2022 09:56)  linagliptin 5 mg oral tablet: 1 tab(s) orally once a day (17 Jul 2022 09:56)  Lumigan 0.01% ophthalmic solution: 1 drop(s) to each affected eye once a day (17 Jul 2022 09:56)  Metanx oral capsule: 1 cap(s) orally 2 times a day (17 Jul 2022 09:56)  metFORMIN-pioglitazone 850 mg-15 mg oral tablet: 1 tab(s) orally 2 times a day (17 Jul 2022 09:56)  Nasonex 50 mcg/inh nasal spray: 2 spray(s) nasal once a day (17 Jul 2022 09:56)  Onglyza 5 mg oral tablet: 1 tab(s) orally once a day (17 Jul 2022 09:56)  pentoxifylline 400 mg oral tablet, extended release: 1 tab(s) orally 3 times a day (17 Jul 2022 09:56)  pioglitazone-metformin 15 mg-850 mg oral tablet: 1 tab(s) orally 2 times a day (17 Jul 2022 09:56)  rosuvastatin 20 mg oral tablet: 1 tab(s) orally once a day (17 Jul 2022 09:56)        MEDICATIONS  (STANDING):  atorvastatin 80 milliGRAM(s) Oral at bedtime  dextrose 5%. 1000 milliLiter(s) (100 mL/Hr) IV Continuous <Continuous>  dextrose 5%. 1000 milliLiter(s) (50 mL/Hr) IV Continuous <Continuous>  dextrose 50% Injectable 25 Gram(s) IV Push once  dextrose 50% Injectable 12.5 Gram(s) IV Push once  dextrose 50% Injectable 25 Gram(s) IV Push once  glucagon  Injectable 1 milliGRAM(s) IntraMuscular once  insulin glargine Injectable (LANTUS) 4 Unit(s) SubCutaneous <User Schedule>  insulin lispro (ADMELOG) corrective regimen sliding scale   SubCutaneous every 6 hours  pantoprazole Infusion 8 mG/Hr (10 mL/Hr) IV Continuous <Continuous>  potassium phosphate / sodium phosphate Powder (PHOS-NaK) 1 Packet(s) Oral once  sodium chloride 0.9%. 1000 milliLiter(s) (75 mL/Hr) IV Continuous <Continuous>        EKG:  RADIOLOGY:  DIAGNOSTIC TESTING:  [ ] Echocardiogram:  [ ] Catherterization:  [ ] Stress Test:  OTHER:     LABS:	 	                          5.3    15.78 )-----------( 132      ( 19 Jul 2022 04:44 )             15.7     07-19    145  |  113<H>  |  120<H>  ----------------------------<  163<H>  4.6   |  10<LL>  |  2.58<H>    Ca    8.5      19 Jul 2022 04:44  Phos  5.2     07-19  Mg     2.10     07-19    TPro  4.4<L>  /  Alb  2.9<L>  /  TBili  0.7  /  DBili  x   /  AST  37  /  ALT  44<H>  /  AlkPhos  30<L>  07-19      PT/INR - ( 19 Jul 2022 04:44 )   PT: 16.4 sec;   INR: 1.41 ratio         PTT - ( 19 Jul 2022 04:44 )  PTT:24.9 sec    CARDIAC MARKERS:

## 2022-07-19 NOTE — RAPID RESPONSE TEAM SUMMARY - NSSITUATIONBACKGROUNDRRT_GEN_ALL_CORE
76 y.o male with DM, HTN, HLD, PVD s/p left right femoral-femoral bypass with right femoral-popliteal bypass graft, hx of femur fracture s/p surgical interventions who presents for two episodes of coffee ground emesis. GI consulted, was planned for EGD, due to hypotension SBP 80s with Hgb 5.6, HCO3 10, lactate 7, patient was accepted to MICU for urgent endoscopy. Patient was given 2u pRBC in interim, no repeat labs were drawn.   During MICU transport, patient became hypotensive to SBP ~30s, and  then went into PEA arrest likely 2/2 to hypovolemia and/or acidosis.   Code blue was called, CPR was initiated for asytole/PEA, 4 epis given, code ran for approximately 12.5 minutes, 2 amp bicarb was given, achieved ROSC. 2 I/O were inserted for access.   Gave 1u PRBC, started levo and amber gtt maxed, 1 addition amp bicarb,  fent 50x2, difficult to obtain accurate BP reads, however SBP ~ 100s, ABG obtained which showed pH 6.9, pCO2 25, pO2 204, lactate 7.3  Family updated by ICU attending post ROSC  MICU at bedside, patient transported to MICU on levo, amber gtt, and prop gtt for sedation

## 2022-07-19 NOTE — PROGRESS NOTE ADULT - ASSESSMENT
A/P    76 y.o male with DM, HTN, HLD, PVD s/p left right femoral-femoral bypass with right femoral-popliteal bypass graft, hx of femur fracture s/p surgical interventions who presents for two episodes of coffee ground emesis, found to be hypotensive and Hgb 7.9 from 14 2015, concerning for GI bleed.      #Upper GI bleed  -s/p PRBC's  -trend cbc  -GI f/u  -ppi  -on DAP as outpt, now on hold  -remains optimized from CV standpoint to proceed with acceptable cardiac risk for EGD       #MELECIO  -med f/u    #Hypertension.   -hold meds    #Peripheral vascular disease.   -was on aspirin/plavix/pentoxifylline  -now on hold due to bleeding and hypotension.      dvt ppx    35 minutes spent on total encounter; more than 50% of the visit was spent counseling and/or coordinating care by the attending physician.

## 2022-07-19 NOTE — DIETITIAN INITIAL EVALUATION ADULT - NSICDXPASTMEDICALHX_GEN_ALL_CORE_FT
PAST MEDICAL HISTORY:  Diabetes mellitus     Fracture of femur Had surgery (1997 )    Glaucoma     Hyperlipidemia     Hypertension

## 2022-07-19 NOTE — CONSULT NOTE ADULT - ASSESSMENT
76 y.o male with DM, HTN, HLD, PVD s/p left right femoral-femoral bypass with right femoral-popliteal bypass graft, hx of femur fracture s/p surgical interventions who presents for two episodes of coffee ground emesis.     MELECIO  Unknown baseline, 1.3 in 2015  suspected to be due to hypotension/anemia/GIB  appreciate MICU management of underlying cause  unstable for HD at this time  Daily BMP    Metabolic acidosis  Due to lactate  management of shock per MICU team  if AGA not from Lactate or NAGMA might benefit from HD for acidosis    GIB  Per GI recs    Poor prognosis  Unable to reach family by phone at this time      Thank you for allowing me to participate in the care of your patient    For any question, call:  Cell # [phone]  Pager # [phone]  Callback # [phone]

## 2022-07-19 NOTE — PROVIDER CONTACT NOTE (CHANGE IN STATUS NOTIFICATION) - ASSESSMENT
Unable to get patients temperature orally, rectal temp of 95.8. Patient reports feeling hot at times and then cold at times. Remains hypotensive
Patient has been lethargic at times, complaining of abdominal pain. No complaints of dizziness or lightheadedness, patient has been very weak. Hypotensive and afebrile, see flowsheet.

## 2022-07-19 NOTE — PROVIDER CONTACT NOTE (CHANGE IN STATUS NOTIFICATION) - BACKGROUND
75YO M on dual antiplatelet therapy who reports with 2 episodes of coffee ground emesis, found hypotensive with HG of 7.9 from 12, s/p 4 units PRBC, 1 Unit plasma and 1 Unit platelets.

## 2022-07-19 NOTE — PROVIDER CONTACT NOTE (CHANGE IN STATUS NOTIFICATION) - BACKGROUND
77 YO M came in with 2 episodes of coffee ground emesis and low hemoglobin s/p 4 units PRBC's, 1 unit plasma and 1 unit platelet

## 2022-07-19 NOTE — CONSULT NOTE ADULT - CONSULT REQUESTED DATE/TIME
18-Jul-2022 13:49
19-Jul-2022 07:02
19-Jul-2022 15:40
16-Jul-2022 23:46
19-Jul-2022 05:05
17-Jul-2022 11:15

## 2022-07-19 NOTE — DIETITIAN INITIAL EVALUATION ADULT - PERTINENT LABORATORY DATA
07-19    145  |  113<H>  |  120<H>  ----------------------------<  163<H>  4.6   |  10<LL>  |  2.58<H>    Ca    8.5      19 Jul 2022 04:44  Phos  5.2     07-19  Mg     2.10     07-19    TPro  4.4<L>  /  Alb  2.9<L>  /  TBili  0.7  /  DBili  x   /  AST  37  /  ALT  44<H>  /  AlkPhos  30<L>  07-19  POCT Blood Glucose.: 107 mg/dL (07-19-22 @ 14:23)  A1C with Estimated Average Glucose Result: 5.7 % (07-18-22 @ 07:19)

## 2022-07-19 NOTE — CHART NOTE - NSCHARTNOTEFT_GEN_A_CORE
Nephrology re-consulted due to concern for need of CRRT i/s/o oliguric MELECIO and persistent severe metabolic acidosis despite bicarb gtt and temporizing measures by IR for GIB. Consent for RRT/HD obtained attending Dr. Abdul ([phone]) via telephone. Thoroughly reviewed risks and benefits of RRT/HD with patient's wife Katia Crisostomo. Patient's wife agrees to dialytic therapy if needed. All questions answered. Will plan on initiating CVVHDF today pending repeat blood work.

## 2022-07-19 NOTE — DIETITIAN INITIAL EVALUATION ADULT - ADD RECOMMEND
1. Consider alternate means of nutrition support once pt hemodynamically stable. 2. Monitor weights, labs, BM's, skin integrity. 3. Follow pt as per protocol.

## 2022-07-19 NOTE — PROCEDURE NOTE - PROCEDURE FINDINGS AND DETAILS
celiac and SMA angiograms show no evidence of active extravasation    GDA empirically embolized in entirety, including branches traversing near endoscopic clip    left groin access hemostasis achieved with manual compression celiac and SMA angiograms show no evidence of active extravasation. severe vasopasm consistent with hemodynamic instability    GDA empirically embolized in entirety, including branches traversing near endoscopic clip    left groin access hemostasis achieved with manual compression

## 2022-07-19 NOTE — PROVIDER CONTACT NOTE (CRITICAL VALUE NOTIFICATION) - ASSESSMENT
Patient has been lethargic at times, complaining of abdominal pain. NO complaints of dizziness or lightheadedness, patient has been very weak. Hypotensive and afebrile, see flowsheet.
Patient has been lethargic at times, complaining of abdominal pain. NO complaints of dizziness or lightheadedness, patient has been very weak. Hypotensive and afebrile, see flowsheet.

## 2022-07-20 NOTE — CHART NOTE - NSCHARTNOTEFT_GEN_A_CORE
Patient seen this morning, s/p EGD yesterday w/ active bleeding in duodenal sweep, s/p IR empiric embolization of GDA, worsening pressor requirements overnight, developed multiorgan failure, not tolerating CRRT.   Patient has since .     Willy Vela, PGY6  GI Fellow

## 2022-07-20 NOTE — PROGRESS NOTE ADULT - SUBJECTIVE AND OBJECTIVE BOX
INTERVAL HPI/OVERNIGHT EVENTS:    SUBJECTIVE: Patient seen and examined at bedside.       VITAL SIGNS:  ICU Vital Signs Last 24 Hrs  T(C): 36.5 (20 Jul 2022 04:00), Max: 36.5 (20 Jul 2022 04:00)  T(F): 97.7 (20 Jul 2022 04:00), Max: 97.7 (20 Jul 2022 04:00)  HR: 96 (20 Jul 2022 06:33) (96 - 133)  BP: 68/38 (19 Jul 2022 13:00) (68/38 - 107/52)  BP(mean): 48 (19 Jul 2022 13:00) (48 - 48)  ABP: 60/32 (20 Jul 2022 06:00) (60/32 - 110/72)  ABP(mean): 39 (20 Jul 2022 06:00) (39 - 85)  RR: 25 (20 Jul 2022 06:00) (18 - 28)  SpO2: 100% (20 Jul 2022 06:33) (86% - 100%)    O2 Parameters below as of 20 Jul 2022 06:00  Patient On (Oxygen Delivery Method): ventilator    O2 Concentration (%): 100      Mode: AC/ CMV (Assist Control/ Continuous Mandatory Ventilation), RR (machine): 25, TV (machine): 500, FiO2: 100, PEEP: 6, ITime: 0.66, MAP: 12, PIP: 25  Plateau pressure:   P/F ratio:     07-18 @ 07:01  -  07-19 @ 07:00  --------------------------------------------------------  IN: 0 mL / OUT: 250 mL / NET: -250 mL    07-19 @ 07:01  -  07-20 @ 06:59  --------------------------------------------------------  IN: 8695.4 mL / OUT: 623 mL / NET: 8072.4 mL      CAPILLARY BLOOD GLUCOSE      POCT Blood Glucose.: 194 mg/dL (20 Jul 2022 05:15)    ECG:    PHYSICAL EXAM:    General:   HEENT:   Neck:   Respiratory:   Cardiovascular:   Abdomen:   Extremities:  Neurological:    MEDICATIONS:  MEDICATIONS  (STANDING):  atorvastatin 80 milliGRAM(s) Oral at bedtime  calcium gluconate IVPB 2 Gram(s) IV Intermittent once  chlorhexidine 0.12% Liquid 15 milliLiter(s) Oral Mucosa every 12 hours  CRRT Treatment    <Continuous>  dexMEDEtomidine Infusion 0.4 MICROgram(s)/kG/Hr (6.79 mL/Hr) IV Continuous <Continuous>  dextrose 5%. 1000 milliLiter(s) (50 mL/Hr) IV Continuous <Continuous>  dextrose 5%. 1000 milliLiter(s) (100 mL/Hr) IV Continuous <Continuous>  dextrose 50% Injectable 25 Gram(s) IV Push once  dextrose 50% Injectable 12.5 Gram(s) IV Push once  dextrose 50% Injectable 25 Gram(s) IV Push once  DOBUTamine Infusion 5 MICROgram(s)/kG/Min (10.2 mL/Hr) IV Continuous <Continuous>  glucagon  Injectable 1 milliGRAM(s) IntraMuscular once  hydrocortisone sodium succinate Injectable 100 milliGRAM(s) IV Push every 8 hours  insulin glargine Injectable (LANTUS) 4 Unit(s) SubCutaneous <User Schedule>  insulin lispro (ADMELOG) corrective regimen sliding scale   SubCutaneous every 6 hours  norepinephrine Infusion 0.05 MICROgram(s)/kG/Min (3.18 mL/Hr) IV Continuous <Continuous>  pantoprazole Infusion 8 mG/Hr (10 mL/Hr) IV Continuous <Continuous>  phenylephrine    Infusion 0.5 MICROgram(s)/kG/Min (6.3 mL/Hr) IV Continuous <Continuous>  PrismaSATE Dialysate BGK 4 / 2.5 5000 milliLiter(s) (1000 mL/Hr) CRRT <Continuous>  PrismaSOL Filtration BGK 4 / 2.5 5000 milliLiter(s) (800 mL/Hr) CRRT <Continuous>  PrismaSOL Filtration BGK 4 / 2.5 5000 milliLiter(s) (200 mL/Hr) CRRT <Continuous>  sodium bicarbonate  Infusion 0.223 mEq/kG/Hr (100 mL/Hr) IV Continuous <Continuous>  vasopressin Infusion 0.04 Unit(s)/Min (2.4 mL/Hr) IV Continuous <Continuous>    MEDICATIONS  (PRN):  dextrose Oral Gel 15 Gram(s) Oral once PRN Blood Glucose LESS THAN 70 milliGRAM(s)/deciliter  ondansetron Injectable 4 milliGRAM(s) IV Push every 8 hours PRN Nausea and/or Vomiting      ALLERGIES:  Allergies    No Known Allergies    Intolerances        LABS:                        9.9    2.49  )-----------( 79       ( 20 Jul 2022 03:40 )             28.1     07-20    147<H>  |  109<H>  |  109<H>  ----------------------------<  236<H>  5.3   |  11<L>  |  3.37<H>    Ca    6.6<L>      20 Jul 2022 03:40  Phos  8.6     07-20  Mg     2.30     07-20    TPro  3.3<L>  /  Alb  1.9<L>  /  TBili  1.2  /  DBili  x   /  AST  3222<H>  /  ALT  2171<H>  /  AlkPhos  76  07-20    PT/INR - ( 20 Jul 2022 00:14 )   PT: 23.9 sec;   INR: 2.05 ratio         PTT - ( 20 Jul 2022 00:14 )  PTT:39.8 sec      RADIOLOGY & ADDITIONAL TESTS: Reviewed.   INTERVAL HPI/OVERNIGHT EVENTS: see accept note    SUBJECTIVE: Patient seen and examined at bedside.       VITAL SIGNS:  ICU Vital Signs Last 24 Hrs  T(C): 36.5 (20 Jul 2022 04:00), Max: 36.5 (20 Jul 2022 04:00)  T(F): 97.7 (20 Jul 2022 04:00), Max: 97.7 (20 Jul 2022 04:00)  HR: 96 (20 Jul 2022 06:33) (96 - 133)  BP: 68/38 (19 Jul 2022 13:00) (68/38 - 107/52)  BP(mean): 48 (19 Jul 2022 13:00) (48 - 48)  ABP: 60/32 (20 Jul 2022 06:00) (60/32 - 110/72)  ABP(mean): 39 (20 Jul 2022 06:00) (39 - 85)  RR: 25 (20 Jul 2022 06:00) (18 - 28)  SpO2: 100% (20 Jul 2022 06:33) (86% - 100%)    O2 Parameters below as of 20 Jul 2022 06:00  Patient On (Oxygen Delivery Method): ventilator    O2 Concentration (%): 100      Mode: AC/ CMV (Assist Control/ Continuous Mandatory Ventilation), RR (machine): 25, TV (machine): 500, FiO2: 100, PEEP: 6, ITime: 0.66, MAP: 12, PIP: 25  Plateau pressure:   P/F ratio:     07-18 @ 07:01  -  07-19 @ 07:00  --------------------------------------------------------  IN: 0 mL / OUT: 250 mL / NET: -250 mL    07-19 @ 07:01  -  07-20 @ 06:59  --------------------------------------------------------  IN: 8695.4 mL / OUT: 623 mL / NET: 8072.4 mL      CAPILLARY BLOOD GLUCOSE      POCT Blood Glucose.: 194 mg/dL (20 Jul 2022 05:15)    ECG:    PHYSICAL EXAM:    GENERAL: NAD, sedated  HEAD:  Atraumatic, Normocephalic  EYES: EOMI, PERRLA, conjunctiva and sclera clear  ENT: Moist mucous membranes  NECK: Supple, No JVD  CHEST/LUNG: ETT in place, on mechanical ventialtion. Clear to auscultation bilaterally; No rales, rhonchi, wheezing, or rubs.   HEART: Regular rate and rhythm; No murmurs, rubs, or gallops  ABDOMEN: BSx4; Soft, nontender, nondistended  EXTREMITIES:  2+ Peripheral Pulses, brisk capillary refill. No clubbing, cyanosis, or edema  NERVOUS SYSTEM:  intubated and sedated  SKIN: No rashes or lesions  MEDICATIONS:  MEDICATIONS  (STANDING):  atorvastatin 80 milliGRAM(s) Oral at bedtime  calcium gluconate IVPB 2 Gram(s) IV Intermittent once  chlorhexidine 0.12% Liquid 15 milliLiter(s) Oral Mucosa every 12 hours  CRRT Treatment    <Continuous>  dexMEDEtomidine Infusion 0.4 MICROgram(s)/kG/Hr (6.79 mL/Hr) IV Continuous <Continuous>  dextrose 5%. 1000 milliLiter(s) (50 mL/Hr) IV Continuous <Continuous>  dextrose 5%. 1000 milliLiter(s) (100 mL/Hr) IV Continuous <Continuous>  dextrose 50% Injectable 25 Gram(s) IV Push once  dextrose 50% Injectable 12.5 Gram(s) IV Push once  dextrose 50% Injectable 25 Gram(s) IV Push once  DOBUTamine Infusion 5 MICROgram(s)/kG/Min (10.2 mL/Hr) IV Continuous <Continuous>  glucagon  Injectable 1 milliGRAM(s) IntraMuscular once  hydrocortisone sodium succinate Injectable 100 milliGRAM(s) IV Push every 8 hours  insulin glargine Injectable (LANTUS) 4 Unit(s) SubCutaneous <User Schedule>  insulin lispro (ADMELOG) corrective regimen sliding scale   SubCutaneous every 6 hours  norepinephrine Infusion 0.05 MICROgram(s)/kG/Min (3.18 mL/Hr) IV Continuous <Continuous>  pantoprazole Infusion 8 mG/Hr (10 mL/Hr) IV Continuous <Continuous>  phenylephrine    Infusion 0.5 MICROgram(s)/kG/Min (6.3 mL/Hr) IV Continuous <Continuous>  PrismaSATE Dialysate BGK 4 / 2.5 5000 milliLiter(s) (1000 mL/Hr) CRRT <Continuous>  PrismaSOL Filtration BGK 4 / 2.5 5000 milliLiter(s) (800 mL/Hr) CRRT <Continuous>  PrismaSOL Filtration BGK 4 / 2.5 5000 milliLiter(s) (200 mL/Hr) CRRT <Continuous>  sodium bicarbonate  Infusion 0.223 mEq/kG/Hr (100 mL/Hr) IV Continuous <Continuous>  vasopressin Infusion 0.04 Unit(s)/Min (2.4 mL/Hr) IV Continuous <Continuous>    MEDICATIONS  (PRN):  dextrose Oral Gel 15 Gram(s) Oral once PRN Blood Glucose LESS THAN 70 milliGRAM(s)/deciliter  ondansetron Injectable 4 milliGRAM(s) IV Push every 8 hours PRN Nausea and/or Vomiting      ALLERGIES:  Allergies    No Known Allergies    Intolerances        LABS:                        9.9    2.49  )-----------( 79       ( 20 Jul 2022 03:40 )             28.1     07-20    147<H>  |  109<H>  |  109<H>  ----------------------------<  236<H>  5.3   |  11<L>  |  3.37<H>    Ca    6.6<L>      20 Jul 2022 03:40  Phos  8.6     07-20  Mg     2.30     07-20    TPro  3.3<L>  /  Alb  1.9<L>  /  TBili  1.2  /  DBili  x   /  AST  3222<H>  /  ALT  2171<H>  /  AlkPhos  76  07-20    PT/INR - ( 20 Jul 2022 00:14 )   PT: 23.9 sec;   INR: 2.05 ratio         PTT - ( 20 Jul 2022 00:14 )  PTT:39.8 sec      RADIOLOGY & ADDITIONAL TESTS: Reviewed.   INTERVAL HPI/OVERNIGHT EVENTS: see accept note    SUBJECTIVE: Patient seen and examined at bedside.       VITAL SIGNS:  ICU Vital Signs Last 24 Hrs  T(C): 36.5 (20 Jul 2022 04:00), Max: 36.5 (20 Jul 2022 04:00)  T(F): 97.7 (20 Jul 2022 04:00), Max: 97.7 (20 Jul 2022 04:00)  HR: 96 (20 Jul 2022 06:33) (96 - 133)  BP: 68/38 (19 Jul 2022 13:00) (68/38 - 107/52)  BP(mean): 48 (19 Jul 2022 13:00) (48 - 48)  ABP: 60/32 (20 Jul 2022 06:00) (60/32 - 110/72)  ABP(mean): 39 (20 Jul 2022 06:00) (39 - 85)  RR: 25 (20 Jul 2022 06:00) (18 - 28)  SpO2: 100% (20 Jul 2022 06:33) (86% - 100%)    O2 Parameters below as of 20 Jul 2022 06:00  Patient On (Oxygen Delivery Method): ventilator    O2 Concentration (%): 100      Mode: AC/ CMV (Assist Control/ Continuous Mandatory Ventilation), RR (machine): 25, TV (machine): 500, FiO2: 100, PEEP: 6, ITime: 0.66, MAP: 12, PIP: 25  Plateau pressure:   P/F ratio:     07-18 @ 07:01  -  07-19 @ 07:00  --------------------------------------------------------  IN: 0 mL / OUT: 250 mL / NET: -250 mL    07-19 @ 07:01  -  07-20 @ 06:59  --------------------------------------------------------  IN: 8695.4 mL / OUT: 623 mL / NET: 8072.4 mL      CAPILLARY BLOOD GLUCOSE      POCT Blood Glucose.: 194 mg/dL (20 Jul 2022 05:15)    ECG:    PHYSICAL EXAM:    GENERAL: NAD, sedated  HEAD:  Atraumatic, Normocephalic  EYES: Pupils fixed and dilated   ENT: Moist mucous membranes  NECK: Supple, No JVD  CHEST/LUNG: ETT in place, on mechanical ventialtion. Clear to auscultation bilaterally; No rales, rhonchi, wheezing, or rubs.   HEART: Regular rate and rhythm; No murmurs, rubs, or gallops  ABDOMEN: BSx4; Soft, nontender, nondistended  EXTREMITIES: No clubbing, cyanosis, or edema  NERVOUS SYSTEM:  intubated and sedated  SKIN: No rashes or lesions    MEDICATIONS:  MEDICATIONS  (STANDING):  atorvastatin 80 milliGRAM(s) Oral at bedtime  calcium gluconate IVPB 2 Gram(s) IV Intermittent once  chlorhexidine 0.12% Liquid 15 milliLiter(s) Oral Mucosa every 12 hours  CRRT Treatment    <Continuous>  dexMEDEtomidine Infusion 0.4 MICROgram(s)/kG/Hr (6.79 mL/Hr) IV Continuous <Continuous>  dextrose 5%. 1000 milliLiter(s) (50 mL/Hr) IV Continuous <Continuous>  dextrose 5%. 1000 milliLiter(s) (100 mL/Hr) IV Continuous <Continuous>  dextrose 50% Injectable 25 Gram(s) IV Push once  dextrose 50% Injectable 12.5 Gram(s) IV Push once  dextrose 50% Injectable 25 Gram(s) IV Push once  DOBUTamine Infusion 5 MICROgram(s)/kG/Min (10.2 mL/Hr) IV Continuous <Continuous>  glucagon  Injectable 1 milliGRAM(s) IntraMuscular once  hydrocortisone sodium succinate Injectable 100 milliGRAM(s) IV Push every 8 hours  insulin glargine Injectable (LANTUS) 4 Unit(s) SubCutaneous <User Schedule>  insulin lispro (ADMELOG) corrective regimen sliding scale   SubCutaneous every 6 hours  norepinephrine Infusion 0.05 MICROgram(s)/kG/Min (3.18 mL/Hr) IV Continuous <Continuous>  pantoprazole Infusion 8 mG/Hr (10 mL/Hr) IV Continuous <Continuous>  phenylephrine    Infusion 0.5 MICROgram(s)/kG/Min (6.3 mL/Hr) IV Continuous <Continuous>  PrismaSATE Dialysate BGK 4 / 2.5 5000 milliLiter(s) (1000 mL/Hr) CRRT <Continuous>  PrismaSOL Filtration BGK 4 / 2.5 5000 milliLiter(s) (800 mL/Hr) CRRT <Continuous>  PrismaSOL Filtration BGK 4 / 2.5 5000 milliLiter(s) (200 mL/Hr) CRRT <Continuous>  sodium bicarbonate  Infusion 0.223 mEq/kG/Hr (100 mL/Hr) IV Continuous <Continuous>  vasopressin Infusion 0.04 Unit(s)/Min (2.4 mL/Hr) IV Continuous <Continuous>    MEDICATIONS  (PRN):  dextrose Oral Gel 15 Gram(s) Oral once PRN Blood Glucose LESS THAN 70 milliGRAM(s)/deciliter  ondansetron Injectable 4 milliGRAM(s) IV Push every 8 hours PRN Nausea and/or Vomiting      ALLERGIES:  Allergies  No Known Allergies  Intolerances    LABS:                        9.9    2.49  )-----------( 79       ( 20 Jul 2022 03:40 )             28.1     07-20    147<H>  |  109<H>  |  109<H>  ----------------------------<  236<H>  5.3   |  11<L>  |  3.37<H>    Ca    6.6<L>      20 Jul 2022 03:40  Phos  8.6     07-20  Mg     2.30     07-20    TPro  3.3<L>  /  Alb  1.9<L>  /  TBili  1.2  /  DBili  x   /  AST  3222<H>  /  ALT  2171<H>  /  AlkPhos  76  07-20    PT/INR - ( 20 Jul 2022 00:14 )   PT: 23.9 sec;   INR: 2.05 ratio         PTT - ( 20 Jul 2022 00:14 )  PTT:39.8 sec      RADIOLOGY & ADDITIONAL TESTS: Reviewed.

## 2022-07-20 NOTE — DISCHARGE NOTE FOR THE EXPIRED PATIENT - HOSPITAL COURSE
75 y/o M with PMH of T2DM, HTN, HLD, severe peripheral vascular disease, status post left right femoral-femoral bypass with right femoral-popliteal bypass graft in the past, history of femur fracture requiring multiple operations to fix the femur in 1990s, former smoker who quit in 1996 presenting with two episodes of hematemesis. Pt reports this has never happened before, first episode was Friday night, then on Saturday morning. Vomitus was coffee ground, sometimes had bright red blood, was approximately 1 cup in volume per pt. Had a EGD/colonoscopy approximately 1 year ago, reports no abnormalities as far as he can remember. Reports no abdominal pain, no diarrhea, no bloody stool, no hemoptysis no hematochezia, melena, dysuria, hematuria. Denied fever, chills, CP, SOB, nausea, vomiting.     MICU reconsulted for hypotension to SBP 80s, Hgb 5.6,  c/f UGIB. Plan for EGD later today (7/19) with GI. Repeat Hgb was 5.3, ordered for bleeding scan to evaluate for source of GI bleed. Pt was also ordered for 2u PRBC. Pt accepted to MICU for close monitoring.  Prior to transfer to MICU, pt became hypotensive SBP~30s, then subsequently went into cardiac arrest. Code blue was called, pt found to be in PEA, received 4 epi's, 2 amps of bicarb and ROSC was achieved after approximately ~12.5 minutes.  Post arrest 1 additional u PRBC ordered. He was started propofol for sedation. Required pressor support w/ levo, amber gtt maxed. Also received 1 addition amp bicarb, fent 50x2.    ABG obtained which showed pH 6.9, pCO2 25, pO2 204, lactate 7.3. Of note observed to have woken up following arrest.  Upon transfer to MICU pt remained hypotensive ~SBP 50, vaso gtt was added. He also received additional bicarb pushes and started on bicarb gtt for pH 6.9.   POCUS showed collapsed IVC, preserved LV function. 1L LR bolus was ordered. OGT placed to suction was noted w/ bright red blood.   After receiving total of 4uPRBC Hb improved to 8.6.   GI came to bedside for EGD, active bleed noted in proximal duodenum, IR consulted emergently. MTP activated.    Patient underwent GDA embolization by IR and was given multiple blood transfusions. Despite resuscitation, patient continued to remain hypotensive, refractory to maximal pressors and further resuscitation. Patient passed away from hemorrhagic shock at 10:59am.     Family was notifide. Attending and MICU team at bedside.

## 2022-07-20 NOTE — PROGRESS NOTE ADULT - ATTENDING COMMENTS
76 M with dm2, htn, hld, PAD, s/p fem/fem and fem/pop bypasses in past here with hematemesis due to UGIB s/p cardiac arrest 7/19 with acute hypoxemic respiratory failure, MELECIO, metabolic acidosis, shock all requiring intubation, s/p embolization by IR for UGIB    This AM, patient in profound metabolic acidosis, mutli organ failure (MELECIO), hemorraghic shock    pupils fixed and dilated     - continue with multiple vasopressors and fluid boluses for shock  - acute blood loss anemia - given prbc overnight with no improvement in shock  - family aware of multi organ failure and shock, as well as fact that pupils dilated; they are aware of his overall poor prognosis and that he is unfortunately dying.  Critically ill patient requiring frequent bedside visits with therapy changes.    Later in morning, patient had cardiac arrest and passed away.  Family at bedside.

## 2022-07-20 NOTE — PROCEDURE NOTE - NSPROCDETAILS_GEN_ALL_CORE
location identified, draped/prepped, sterile technique used, needle inserted/introduced/positive blood return obtained via catheter/connected to a pressurized flush line/sutured in place/hemostasis with direct pressure, dressing applied/Seldinger technique/all materials/supplies accounted for at end of procedure
guidewire recovered/lumen(s) aspirated and flushed/sterile dressing applied/sterile technique, catheter placed/ultrasound guidance with use of sterile gel and probe cove
guidewire recovered/lumen(s) aspirated and flushed/sterile dressing applied/sterile technique, catheter placed/ultrasound guidance with use of sterile gel and probe cove
patient pre-oxygenated, tube inserted, placement confirmed

## 2022-07-20 NOTE — PROCEDURE NOTE - NSINDICATIONS_GEN_A_CORE
dialysis/CRRT
critical illness/emergency venous access/venous access/volume resuscitation
cardiac arrest
arterial puncture to obtain ABG's/blood sampling/cannulation purposes/critical patient/monitoring purposes

## 2022-07-20 NOTE — CHART NOTE - NSCHARTNOTEFT_GEN_A_CORE
MEDICINE NOTE    Called to bedside to evaluate the patient for asystole.     On physical exam, patient did not respond to verbal or noxious stimuli.  There were no spontaneous respirations.  Heart and breath sounds were absent.  There were no radial and carotid pulses.  Pupils were fixed and dilated without corneal reflex.  Telemetry shows asystole. Patient pronounced dead at 10:59am.  Attending at bedside.  Family declined autopsy.    Claire Dubon MD  PGY-3  Internal Medicine  Available on TEAMS

## 2022-07-20 NOTE — PROCEDURE NOTE - NSINFORMCONSENT_GEN_A_CORE
This was an emergent procedure.
emergency consent obtained from family at bedside by IR attending/Benefits, risks, and possible complications of procedure explained to patient/caregiver who verbalized understanding and gave written consent.
Benefits, risks, and possible complications of procedure explained to patient/caregiver who verbalized understanding and gave written consent.
This was an emergent procedure.

## 2022-07-20 NOTE — PROGRESS NOTE ADULT - ASSESSMENT
75 y/o M with PMH of T2DM, HTN, HLD, severe peripheral vascular disease, status post left right femoral-femoral bypass with right femoral-popliteal bypass graft in the past, history of femur fracture requiring multiple operations to fix the femur in 1990s, former smoker who quit in 1996 presenting with two episodes of hematemesis. MICU reconsulted for hypotension and Hgb 5.9  2/2 likely UGIB. S/p cardiac arrest on 7/19 prior to MICU transfer w/ ROSC.     #Neuro  - baseline A&Ox3  - noted to wake up purposefully following cardiac arrest  - continue propofol for sedation, wean as tolerated when stable to assess neurological function following cardiac arrest.    #Cardiovascular  PEA arrest likely 2/2 hemorrhagic shock in setting of GIB  - 7/19- Prior to transfer to MICU, pt became hypotensive SBP~30s, then subsequently went into cardiac arrest. Code blue was called, pt found to be in PEA, received 4 epi's, 2 amps of bicarb and ROSC was achieved after approximately ~12.5 minutes.  - maxxed on levo, amber, vaso  - s/p 5 uPRBC today, HB 5.3->8.6, then MTP  - cont to monitor CBC closely and tranfuse PRN for Hb >7  - s/p 1L LR and started on bicarb gtt @100cc/hr for acidosis pH 6.9  - EKG NSR w/ RBBB TWI in aVR, aVL. monitor trops.   - ECHO 7/18: 1. Systolic anterior motion of the mitral valve. 2. Reversal of the E-A  waves of the mitral inflow pattern is consistent with diastolic LV dysfunction. 3. Normal right ventricular size and function. There is a significant LVOT gradient. Doppler envelopes overlap with mitral signals making accurate gradients difficult to obtain. Grossly, there is at least mild to moderate obstruction with a peak gradient of 26 mm Hg and mean 12 mm Hg. These are likely underestimations.  - POCUS: Grossly normal LVSF. LV > RV. Hypertrophied interventricular septum with systolic anterior motion of the mitral valve. No pericardial effusion. IVC 0.6 cm with respiratory variation.     #Respiratory  intubated for airway protection s/p cardiac arrest 7/19  - vent settings 23/500/5/50%  - ABG - ( 19 Jul 2022 14:50 ) pH, Arterial: 6.90  pH, Blood: x     /  pCO2: 30    /  pO2: 195   / HCO3: 6     / Base Excess: -25.4 /  SaO2: 100.0   - CXR ordered for ETT placement confirmation    #GI/Nutrition  Upper GIB, hemorraghic shock   - presented w/ coffee ground emesis on 7/16, states had EGD/colonoscopy in 2015 which was normal  - s/p 6 uPRBC after MICU admssion initially, received total of 4u PRBC on medical floors  - OGT placed upon arrival to MICU, ~200cc bright red blood noted when placed to wall suction, GI called for EGD   - EGD: Normal esophagus. Clotted blood in the entire stomach. Active bleeding in duodenal sweep, likely GDA territory. Area injected with epi, one hemostatic clip placed and hemostatic spray applied with slowed bleeding and improved hemodynamics.  -became hypotensive to 60's during EGD. MTP  - plan for IR embolization if remains stable  - cont protonix gtt  - monitor CBC Q6hrs, transfuse PRN to maintain HB >6  Shock liver  - in setting of hypoperfusion 2/2 anemia from GIB/cardiac arrest  - monitor synthetic function closely     #/Renal  MELECIO , likely multifactorial in setting of hypotension/GIB/anemia  - sCr 2.53 on admission. Unknown baseline, sCr 1.3 in 2015. Now uptrending ,  3.76 post arrest   - max dose on tripple pressors, unstable for HD at this time  - cmp q6hrs  Metabolic acidosis  - Due to elevated lactate  - cont bicarb gtt, CRRT if stabilizes     #Skin  - no active issues    #ID  - no active issues    #Endocrine  - no active issues    #Hematologic/DVT ppx  - Hb 7.9 on admission, noted to be 5.3 on 7/19  -s/p MTP on admission to MICU 7/19  - SCDs in setting of GIB    #Ethics  FULL CODE    Lines:  R Axillary A- line (7/19- )  L IJ CVC ( 7/19-)  peripherals. 75 y/o M with PMH of T2DM, HTN, HLD, severe peripheral vascular disease, status post left right femoral-femoral bypass with right femoral-popliteal bypass graft in the past, history of femur fracture requiring multiple operations to fix the femur in 1990s, former smoker who quit in 1996 presenting with two episodes of hematemesis. MICU reconsulted for hypotension and Hgb 5.9  2/2 likely UGIB. S/p cardiac arrest on 7/19 prior to MICU transfer w/ ROSC.     #Neuro  - baseline A&Ox3  - noted to wake up purposefully following cardiac arrest  - continue propofol for sedation, wean as tolerated when stable to assess neurological function following cardiac arrest.    #Cardiovascular  PEA arrest likely 2/2 hemorrhagic shock in setting of GIB  - 7/19- Prior to transfer to MICU, pt became hypotensive SBP~30s, then subsequently went into cardiac arrest. Code blue was called, pt found to be in PEA, received 4 epi's, 2 amps of bicarb and ROSC was achieved after approximately ~12.5 minutes.  - maxxed on levo, amber, vaso  - s/p 5 uPRBC today, HB 5.3->8.6, then MTP  - cont to monitor CBC closely and tranfuse PRN for Hb >7  - s/p 1L LR and started on bicarb gtt @100cc/hr for acidosis pH 6.9  - EKG NSR w/ RBBB TWI in aVR, aVL. monitor trops.   - ECHO 7/18: 1. Systolic anterior motion of the mitral valve. 2. Reversal of the E-A  waves of the mitral inflow pattern is consistent with diastolic LV dysfunction. 3. Normal right ventricular size and function. There is a significant LVOT gradient. Doppler envelopes overlap with mitral signals making accurate gradients difficult to obtain. Grossly, there is at least mild to moderate obstruction with a peak gradient of 26 mm Hg and mean 12 mm Hg. These are likely underestimations.  - POCUS: Grossly normal LVSF. LV > RV. Hypertrophied interventricular septum with systolic anterior motion of the mitral valve. No pericardial effusion. IVC 0.6 cm with respiratory variation.     #Respiratory  intubated for airway protection s/p cardiac arrest 7/19  - vent settings 28/500/5/50%    #GI/Nutrition  Upper GIB, hemorraghic shock   - presented w/ coffee ground emesis on 7/16, states had EGD/colonoscopy in 2015 which was normal  - s/p 6 uPRBC after MICU admssion initially, received total of 4u PRBC on medical floors  - OGT placed upon arrival to MICU, ~200cc bright red blood noted when placed to wall suction, GI called for EGD   - EGD: Normal esophagus. Clotted blood in the entire stomach. Active bleeding in duodenal sweep, likely GDA territory. Area injected with epi, one hemostatic clip placed and hemostatic spray applied with slowed bleeding and improved hemodynamics.  -became hypotensive to 60's during EGD. MTP  - plan for IR embolization if remains stable  - cont protonix gtt  - monitor CBC Q6hrs, transfuse PRN to maintain HB >6  Shock liver  - in setting of hypoperfusion 2/2 anemia from GIB/cardiac arrest  - monitor synthetic function closely     #/Renal  MELECIO , likely multifactorial in setting of hypotension/GIB/anemia  - sCr 2.53 on admission. Unknown baseline, sCr 1.3 in 2015. Now uptrending ,  3.76 post arrest   - max dose on tripple pressors, unstable for HD at this time  - cmp q6hrs  Metabolic acidosis  - Due to elevated lactate  - cont bicarb gtt, CRRT if stabilizes     #Skin  - no active issues    #ID  - no active issues    #Endocrine  - no active issues    #Hematologic/DVT ppx  - Hb 7.9 on admission, noted to be 5.3 on 7/19  -s/p MTP on admission to MICU 7/19  - SCDs in setting of GIB    #Ethics  FULL CODE

## 2022-07-20 NOTE — CHART NOTE - NSCHARTNOTEFT_GEN_A_CORE
: Joanna Sims MD, Leo Black MD, Oneyda Carmichael MD      INDICATION: GI bleed, shock     PROCEDURE:  [x] LIMITED ECHO  [x] LIMITED CHEST  [ ] LIMITED RETROPERITONEAL  [ ] LIMITED ABDOMINAL  [ ] LIMITED DVT  [ ] NEEDLE GUIDANCE VASCULAR  [ ] NEEDLE GUIDANCE THORACENTESIS  [ ] NEEDLE GUIDANCE PARACENTESIS  [ ] NEEDLE GUIDANCE PERICARDIOCENTESIS  [ ] OTHER    FINDINGS:    A line predominant lung fieds     Hypertrophied IV septum with systolic anterior motion of the mitral valve. On M mode, no evidence of obstructive physiology        INTERPRETATION:    Hypertrophied IV septum with systolic anterior motion of the mitral valve concerning for LVOT obstructive physiology.    Images uploaded on [employer] : Joanna Sims MD, Leo Black MD, Oneyda Carmichael MD      INDICATION: GI bleed, shock     PROCEDURE:  [x] LIMITED ECHO  [x] LIMITED CHEST  [ ] LIMITED RETROPERITONEAL  [ ] LIMITED ABDOMINAL  [ ] LIMITED DVT  [ ] NEEDLE GUIDANCE VASCULAR  [ ] NEEDLE GUIDANCE THORACENTESIS  [ ] NEEDLE GUIDANCE PARACENTESIS  [ ] NEEDLE GUIDANCE PERICARDIOCENTESIS  [ ] OTHER    FINDINGS:    A line predominant lung fieds     LVSF grossly normal; no LVOT obstruction on m-mode doppler       INTERPRETATION:    LVSF grossly normal  no LVOT obstruction on pocus    Images uploaded on [employer]    Attending Attestation:  I was present during the key portions of the procedure and immediately available during the entire procedure.  Oneyda Carmichael MD  Attending  Pulmonary & Critical Care Medicine

## 2022-07-20 NOTE — PROCEDURE NOTE - NSPROCNAME_GEN_A_CORE
Interventional Radiology
Arterial Puncture/Cannulation
Tracheal Intubation
Central Line Insertion
Central Line Insertion

## 2022-07-22 ENCOUNTER — APPOINTMENT (OUTPATIENT)
Dept: VASCULAR SURGERY | Facility: CLINIC | Age: 77
End: 2022-07-22

## 2023-05-26 NOTE — ED PROVIDER NOTE - PROGRESS NOTE ADDITIONAL1
Additional Progress Note... Olumiant Counseling: I discussed with the patient the risks of Olumiant therapy including but not limited to upper respiratory tract infections, shingles, cold sores, and nausea. Live vaccines should be avoided.  This medication has been linked to serious infections; higher rate of mortality; malignancy and lymphoproliferative disorders; major adverse cardiovascular events; thrombosis; gastrointestinal perforations; neutropenia; lymphopenia; anemia; liver enzyme elevations; and lipid elevations.

## 2023-07-05 NOTE — PRE PROCEDURE NOTE - PROCEDURE SERVICE
IR Graft Donor Site Bandage (Optional-Leave Blank If You Don't Want In Note): Sterile pressure bandage applied.

## (undated) DEVICE — DRSG BANDAID 0.75X3"

## (undated) DEVICE — SALIVA EJECTOR (BLUE)

## (undated) DEVICE — PACK IV START WITH CHG

## (undated) DEVICE — TUBING MEDI-VAC W MAXIGRIP CONNECTORS 1/4"X6'

## (undated) DEVICE — DENTURE CUP PINK

## (undated) DEVICE — BIOPSY FORCEP RADIAL JAW 4 STANDARD WITH NEEDLE

## (undated) DEVICE — BITE BLOCK ADULT 20 X 27MM (GREEN)

## (undated) DEVICE — BASIN EMESIS 10IN GRADUATED MAUVE

## (undated) DEVICE — CLAMP BX HOT RAD JAW 3

## (undated) DEVICE — DRSG 2X2

## (undated) DEVICE — FACESHIELD FULL VISOR

## (undated) DEVICE — LUBRICATING JELLY HR ONE SHOT 3G

## (undated) DEVICE — GOWN LG

## (undated) DEVICE — TUBING IV SET GRAVITY 3Y 100" MACRO

## (undated) DEVICE — UNDERPAD LINEN SAVER 17 X 24"

## (undated) DEVICE — DRSG CURITY GAUZE SPONGE 4 X 4" 12-PLY NON-STERILE

## (undated) DEVICE — Device

## (undated) DEVICE — CONTAINER FORMALIN 80ML YELLOW

## (undated) DEVICE — BIOPSY FORCEP COLD DISP

## (undated) DEVICE — ELCTR ECG CONDUCTIVE ADHESIVE

## (undated) DEVICE — LINE BREATHE SAMPLNG

## (undated) DEVICE — CATH IV SAFE BC 22G X 1" (BLUE)